# Patient Record
Sex: FEMALE | Race: WHITE | NOT HISPANIC OR LATINO | Employment: OTHER | URBAN - METROPOLITAN AREA
[De-identification: names, ages, dates, MRNs, and addresses within clinical notes are randomized per-mention and may not be internally consistent; named-entity substitution may affect disease eponyms.]

---

## 2017-01-05 ENCOUNTER — OFFICE VISIT (OUTPATIENT)
Dept: ORTHOPEDIC SURGERY | Facility: CLINIC | Age: 72
End: 2017-01-05

## 2017-01-05 VITALS — BODY MASS INDEX: 33.97 KG/M2 | TEMPERATURE: 97.6 F | HEIGHT: 64 IN | WEIGHT: 199 LBS

## 2017-01-05 DIAGNOSIS — M17.0 PRIMARY OSTEOARTHRITIS OF BOTH KNEES: Primary | ICD-10-CM

## 2017-01-05 PROCEDURE — 20610 DRAIN/INJ JOINT/BURSA W/O US: CPT | Performed by: ORTHOPAEDIC SURGERY

## 2017-01-05 RX ORDER — METHYLPREDNISOLONE ACETATE 80 MG/ML
80 INJECTION, SUSPENSION INTRA-ARTICULAR; INTRALESIONAL; INTRAMUSCULAR; SOFT TISSUE
Status: COMPLETED | OUTPATIENT
Start: 2017-01-05 | End: 2017-01-05

## 2017-01-05 RX ORDER — BUPIVACAINE HYDROCHLORIDE 5 MG/ML
2 INJECTION, SOLUTION PERINEURAL
Status: COMPLETED | OUTPATIENT
Start: 2017-01-05 | End: 2017-01-05

## 2017-01-05 RX ORDER — LIDOCAINE HYDROCHLORIDE 10 MG/ML
4 INJECTION, SOLUTION INFILTRATION; PERINEURAL
Status: COMPLETED | OUTPATIENT
Start: 2017-01-05 | End: 2017-01-05

## 2017-01-05 RX ADMIN — METHYLPREDNISOLONE ACETATE 80 MG: 80 INJECTION, SUSPENSION INTRA-ARTICULAR; INTRALESIONAL; INTRAMUSCULAR; SOFT TISSUE at 15:38

## 2017-01-05 RX ADMIN — LIDOCAINE HYDROCHLORIDE 4 ML: 10 INJECTION, SOLUTION INFILTRATION; PERINEURAL at 15:38

## 2017-01-05 RX ADMIN — BUPIVACAINE HYDROCHLORIDE 2 ML: 5 INJECTION, SOLUTION PERINEURAL at 15:38

## 2017-01-05 NOTE — PROGRESS NOTES
1/5/2017    Deanne Upton is here today for worsening knee pain. Pt has undergone injection of the knee in the past with good resolution of symptoms. Pt is requesting a repeat injection.     KNEE Injection Procedure Note:    Large Joint Arthrocentesis  Date/Time: 1/5/2017 3:38 PM  Consent given by: patient  Site marked: site marked  Timeout: Immediately prior to procedure a time out was called to verify the correct patient, procedure, equipment, support staff and site/side marked as required   Supporting Documentation  Indications: pain and joint swelling   Procedure Details  Location: knee - L knee  Preparation: Patient was prepped and draped in the usual sterile fashion  Needle size: 18 G  Approach: anterolateral  Medications administered: 4 mL lidocaine 1 %; 80 mg methylPREDNISolone acetate 80 MG/ML; 2 mL bupivacaine 0.5 %      Large Joint Arthrocentesis  Date/Time: 1/5/2017 3:38 PM  Consent given by: patient  Site marked: site marked  Timeout: Immediately prior to procedure a time out was called to verify the correct patient, procedure, equipment, support staff and site/side marked as required   Supporting Documentation  Indications: pain and joint swelling   Procedure Details  Location: knee - R knee  Preparation: Patient was prepped and draped in the usual sterile fashion  Needle size: 18 G  Approach: anterolateral  Medications administered: 4 mL lidocaine 1 %; 80 mg methylPREDNISolone acetate 80 MG/ML; 2 mL bupivacaine 0.5 %            At the conclusion of the injection I discussed the importance of continued quad strengthening exercises on a daily basis. I will see the patient back if the symptoms should fail to improve or worsen.    Sinai Allen MA  1/5/2017

## 2017-01-05 NOTE — MR AVS SNAPSHOT
Deanne Upton   1/5/2017 3:30 PM   Office Visit    Dept Phone:  [phone]   Encounter #:  [id number]    Provider:  Praveen Mg MD   Department:  [facility]                Your Full Care Plan              Your Updated Medication List          This list is accurate as of: 1/5/17  3:39 PM.  Always use your most recent med list.                albuterol 108 (90 BASE) MCG/ACT inhaler   Commonly known as:  PROVENTIL HFA;VENTOLIN HFA   Inhale 2 puffs Every 4 (Four) Hours As Needed for wheezing.       B COMPLEX + C TR tablet controlled-release       Cetirizine HCl 10 MG capsule       cyclobenzaprine 10 MG tablet   Commonly known as:  FLEXERIL   1/2 or 1 tid       diazePAM 5 MG tablet   Commonly known as:  VALIUM   Take 1 hour before MRI, may take a second one if nec       Fish Oil 600 MG capsule       fluticasone-salmeterol 250-50 MCG/DOSE DISKUS   Commonly known as:  ADVAIR   Inhale 1 puff 2 (two) times a day.       lisinopril-hydrochlorothiazide 10-12.5 MG per tablet   Commonly known as:  PRINZIDE,ZESTORETIC   Take 1 tablet by mouth  daily       metFORMIN 500 MG tablet   Commonly known as:  GLUCOPHAGE   Take 2 tablets by mouth 2 (two) times a day.       montelukast 10 MG tablet   Commonly known as:  SINGULAIR   TAKE 1 TABLET BY MOUTH  EVERY NIGHT.       MULTI VITAMIN DAILY PO       simvastatin 10 MG tablet   Commonly known as:  ZOCOR   TAKE 1 TABLET BY MOUTH  EVERY NIGHT.       traMADol 50 MG tablet   Commonly known as:  ULTRAM   Take 1 tablet by mouth Every 6 (Six) Hours As Needed for moderate pain (4-6).       Vitamin D 2000 UNITS capsule               We Performed the Following     Large Joint Arthrocentesis     Large Joint Arthrocentesis       Instructions     None    Patient Instructions History      Upcoming Appointments     Visit Type Date Time Department    FOLLOW UP 1/5/2017  3:30 PM [facility] Signup     Our  "records indicate that you have an active [facility] [employer] account.    You can view your After Visit Summary by going to [email or web address] and logging in with your [employer] username and password.  If you don't have a [employer] username and password but a parent or guardian has access to your record, the parent or guardian should login with their own [employer] username and password and access your record to view the After Visit Summary.    If you have questions, you can email [email or web address] or call [phone] to talk to our [employer] staff.  Remember, [employer] is NOT to be used for urgent needs.  For medical emergencies, dial 911.               Other Info from Your Visit           Allergies     Ibuprofen      Lips swell    Naproxen      Tramadol      Visual complaint (light show with eyes closes), joint pain, drowsy,bloody stool with diarrhea!!!!!!!      Reason for Visit     Left Knee - Follow-up, Pain, Injections     Right Knee - Follow-up, Pain, Injections           Vital Signs     Temperature Height Weight Body Mass Index Smoking Status       97.6 °F (36.4 °C) (Temporal Artery ) 64\" (162.6 cm) 199 lb (90.3 kg) 34.16 kg/m2 Former Smoker         "

## 2017-02-01 ENCOUNTER — OFFICE VISIT (OUTPATIENT)
Dept: FAMILY MEDICINE CLINIC | Facility: CLINIC | Age: 72
End: 2017-02-01

## 2017-02-01 VITALS
HEIGHT: 64 IN | TEMPERATURE: 98.2 F | OXYGEN SATURATION: 96 % | WEIGHT: 199 LBS | HEART RATE: 74 BPM | RESPIRATION RATE: 18 BRPM | SYSTOLIC BLOOD PRESSURE: 110 MMHG | DIASTOLIC BLOOD PRESSURE: 70 MMHG | BODY MASS INDEX: 33.97 KG/M2

## 2017-02-01 DIAGNOSIS — A08.4 VIRAL GASTROENTERITIS: Primary | ICD-10-CM

## 2017-02-01 PROCEDURE — 99213 OFFICE O/P EST LOW 20 MIN: CPT | Performed by: NURSE PRACTITIONER

## 2017-02-01 NOTE — PROGRESS NOTES
Subjective   Deanne Upton is a 71 y.o. female.     History of Present Illness     C/o 3 day hx diarrhea and malaise.  Initially had N/V.  But, that has resolved.  5-6 loose stools/day.  + chills. -- fever.  Lower abdominal cramping.  No abdominal pain.  Diarrhea was resolving.  But, was exacerbated after she ate cream based potato soup yesterday.  She noted several small dark red blood clots in the commode yesterday.  She has not taken any otc anti-diarrheals.          The following portions of the patient's history were reviewed and updated as appropriate: allergies, current medications, past family history, past medical history, past social history, past surgical history and problem list.    Review of Systems   Constitutional: Positive for chills and fatigue. Negative for fever.   Gastrointestinal: Positive for diarrhea, nausea and vomiting.   All other systems reviewed and are negative.      Objective   Physical Exam   Constitutional: Vital signs are normal. She appears well-developed and well-nourished.  Non-toxic appearance. She does not have a sickly appearance. She does not appear ill. No distress.   Abdominal: Soft. Normal appearance and bowel sounds are normal. There is no hepatosplenomegaly. There is generalized tenderness and tenderness in the right upper quadrant, right lower quadrant, epigastric area, left upper quadrant and left lower quadrant. There is no rigidity, no rebound and no guarding.       Assessment/Plan   Deanne was seen today for diarrhea and insomnia.    Diagnoses and all orders for this visit:    Viral gastroenteritis      otc Immodium prn.  BRAT diet.  Advance as tolerated.  Replace fluids/electrolytes with Gatorade or similar drink.      RTC prn.  If symptoms become severe go to ED--pt expressed understanding.

## 2017-02-12 DIAGNOSIS — R53.83 FATIGUE, UNSPECIFIED TYPE: ICD-10-CM

## 2017-02-12 DIAGNOSIS — E11.65 TYPE 2 DIABETES MELLITUS WITH HYPERGLYCEMIA, WITHOUT LONG-TERM CURRENT USE OF INSULIN (HCC): ICD-10-CM

## 2017-02-12 DIAGNOSIS — E55.9 HYPOVITAMINOSIS D: Primary | ICD-10-CM

## 2017-02-12 DIAGNOSIS — Z79.899 DRUG THERAPY: ICD-10-CM

## 2017-02-12 DIAGNOSIS — E78.5 HYPERLIPIDEMIA, UNSPECIFIED HYPERLIPIDEMIA TYPE: ICD-10-CM

## 2017-02-12 DIAGNOSIS — Z00.00 HEALTHCARE MAINTENANCE: ICD-10-CM

## 2017-02-28 ENCOUNTER — OFFICE VISIT (OUTPATIENT)
Dept: FAMILY MEDICINE CLINIC | Facility: CLINIC | Age: 72
End: 2017-02-28

## 2017-02-28 VITALS
WEIGHT: 198 LBS | SYSTOLIC BLOOD PRESSURE: 120 MMHG | OXYGEN SATURATION: 97 % | TEMPERATURE: 98.5 F | HEIGHT: 64 IN | HEART RATE: 100 BPM | BODY MASS INDEX: 33.8 KG/M2 | RESPIRATION RATE: 18 BRPM | DIASTOLIC BLOOD PRESSURE: 80 MMHG

## 2017-02-28 DIAGNOSIS — H91.93 HEARING LOSS, BILATERAL: ICD-10-CM

## 2017-02-28 DIAGNOSIS — H90.2 CONDUCTIVE HEARING LOSS: ICD-10-CM

## 2017-02-28 DIAGNOSIS — J01.00 ACUTE MAXILLARY SINUSITIS, RECURRENCE NOT SPECIFIED: Primary | ICD-10-CM

## 2017-02-28 PROCEDURE — 99213 OFFICE O/P EST LOW 20 MIN: CPT | Performed by: FAMILY MEDICINE

## 2017-02-28 RX ORDER — AMOXICILLIN AND CLAVULANATE POTASSIUM 875; 125 MG/1; MG/1
TABLET, FILM COATED ORAL
Qty: 14 TABLET | Refills: 0 | Status: SHIPPED | OUTPATIENT
Start: 2017-02-28 | End: 2017-03-10
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
## 2017-03-07 LAB
25(OH)D3+25(OH)D2 SERPL-MCNC: 39.9 NG/ML (ref 30–100)
ALBUMIN SERPL-MCNC: 4.5 G/DL (ref 3.5–5.2)
ALBUMIN/CREAT UR: <4.9 MG/G CREAT (ref 0–30)
ALBUMIN/GLOB SERPL: 2 G/DL
ALP SERPL-CCNC: 72 U/L (ref 39–117)
ALT SERPL-CCNC: 22 U/L (ref 1–33)
AST SERPL-CCNC: 21 U/L (ref 1–32)
BASOPHILS # BLD AUTO: 0.03 10*3/MM3 (ref 0–0.2)
BASOPHILS NFR BLD AUTO: 0.5 % (ref 0–1.5)
BILIRUB SERPL-MCNC: 0.3 MG/DL (ref 0.1–1.2)
BUN SERPL-MCNC: 18 MG/DL (ref 8–23)
BUN/CREAT SERPL: 18.2 (ref 7–25)
CALCIUM SERPL-MCNC: 9.6 MG/DL (ref 8.6–10.5)
CHLORIDE SERPL-SCNC: 102 MMOL/L (ref 98–107)
CHOLEST SERPL-MCNC: 137 MG/DL (ref 0–200)
CHOLEST/HDLC SERPL: 2.98 {RATIO}
CO2 SERPL-SCNC: 26.5 MMOL/L (ref 22–29)
CREAT SERPL-MCNC: 0.99 MG/DL (ref 0.57–1)
CREAT UR-MCNC: 61.5 MG/DL
EOSINOPHIL # BLD AUTO: 0.4 10*3/MM3 (ref 0–0.7)
EOSINOPHIL NFR BLD AUTO: 6.2 % (ref 0.3–6.2)
ERYTHROCYTE [DISTWIDTH] IN BLOOD BY AUTOMATED COUNT: 13 % (ref 11.7–13)
GLOBULIN SER CALC-MCNC: 2.3 GM/DL
GLUCOSE SERPL-MCNC: 153 MG/DL (ref 65–99)
HBA1C MFR BLD: 6.1 % (ref 4.8–5.6)
HCT VFR BLD AUTO: 43.2 % (ref 35.6–45.5)
HCV AB S/CO SERPL IA: 0.1 S/CO RATIO (ref 0–0.9)
HDLC SERPL-MCNC: 46 MG/DL (ref 40–60)
HGB BLD-MCNC: 14.2 G/DL (ref 11.9–15.5)
IMM GRANULOCYTES # BLD: 0 10*3/MM3 (ref 0–0.03)
IMM GRANULOCYTES NFR BLD: 0 % (ref 0–0.5)
LDLC SERPL CALC-MCNC: 49 MG/DL (ref 0–100)
LYMPHOCYTES # BLD AUTO: 2.34 10*3/MM3 (ref 0.9–4.8)
LYMPHOCYTES NFR BLD AUTO: 36 % (ref 19.6–45.3)
MCH RBC QN AUTO: 29.8 PG (ref 26.9–32)
MCHC RBC AUTO-ENTMCNC: 32.9 G/DL (ref 32.4–36.3)
MCV RBC AUTO: 90.8 FL (ref 80.5–98.2)
MICROALBUMIN UR-MCNC: <3 UG/ML
MONOCYTES # BLD AUTO: 0.54 10*3/MM3 (ref 0.2–1.2)
MONOCYTES NFR BLD AUTO: 8.3 % (ref 5–12)
NEUTROPHILS # BLD AUTO: 3.19 10*3/MM3 (ref 1.9–8.1)
NEUTROPHILS NFR BLD AUTO: 49 % (ref 42.7–76)
PLATELET # BLD AUTO: 228 10*3/MM3 (ref 140–500)
POTASSIUM SERPL-SCNC: 4.4 MMOL/L (ref 3.5–5.2)
PROT SERPL-MCNC: 6.8 G/DL (ref 6–8.5)
RBC # BLD AUTO: 4.76 10*6/MM3 (ref 3.9–5.2)
SODIUM SERPL-SCNC: 143 MMOL/L (ref 136–145)
TRIGL SERPL-MCNC: 212 MG/DL (ref 0–150)
TSH SERPL DL<=0.005 MIU/L-ACNC: 1.28 MIU/ML (ref 0.27–4.2)
VLDLC SERPL CALC-MCNC: 42.4 MG/DL (ref 5–40)
WBC # BLD AUTO: 6.5 10*3/MM3 (ref 4.5–10.7)

## 2017-03-10 ENCOUNTER — OFFICE VISIT (OUTPATIENT)
Dept: FAMILY MEDICINE CLINIC | Facility: CLINIC | Age: 72
End: 2017-03-10

## 2017-03-10 VITALS
DIASTOLIC BLOOD PRESSURE: 80 MMHG | HEIGHT: 64 IN | OXYGEN SATURATION: 97 % | WEIGHT: 201 LBS | SYSTOLIC BLOOD PRESSURE: 128 MMHG | HEART RATE: 78 BPM | BODY MASS INDEX: 34.31 KG/M2 | RESPIRATION RATE: 17 BRPM

## 2017-03-10 DIAGNOSIS — M47.812 CERVICAL SPINE ARTHRITIS: Primary | ICD-10-CM

## 2017-03-10 DIAGNOSIS — E66.01 MORBID OBESITY DUE TO EXCESS CALORIES (HCC): ICD-10-CM

## 2017-03-10 PROCEDURE — 99213 OFFICE O/P EST LOW 20 MIN: CPT | Performed by: FAMILY MEDICINE
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
## 2017-03-11 PROBLEM — E66.01 MORBID OBESITY (HCC): Status: ACTIVE | Noted: 2017-03-11

## 2017-04-14 ENCOUNTER — CLINICAL SUPPORT (OUTPATIENT)
Dept: ORTHOPEDIC SURGERY | Facility: CLINIC | Age: 72
End: 2017-04-14

## 2017-04-14 VITALS — BODY MASS INDEX: 33.46 KG/M2 | TEMPERATURE: 98 F | WEIGHT: 196 LBS | HEIGHT: 64 IN

## 2017-04-14 DIAGNOSIS — M17.0 PRIMARY OSTEOARTHRITIS OF BOTH KNEES: Primary | ICD-10-CM

## 2017-04-14 PROCEDURE — 20610 DRAIN/INJ JOINT/BURSA W/O US: CPT | Performed by: NURSE PRACTITIONER

## 2017-04-14 RX ORDER — METHYLPREDNISOLONE ACETATE 80 MG/ML
80 INJECTION, SUSPENSION INTRA-ARTICULAR; INTRALESIONAL; INTRAMUSCULAR; SOFT TISSUE
Status: COMPLETED | OUTPATIENT
Start: 2017-04-14 | End: 2017-04-14

## 2017-04-14 RX ORDER — BUPIVACAINE HYDROCHLORIDE 5 MG/ML
2 INJECTION, SOLUTION PERINEURAL
Status: COMPLETED | OUTPATIENT
Start: 2017-04-14 | End: 2017-04-14

## 2017-04-14 RX ORDER — LIDOCAINE HYDROCHLORIDE 10 MG/ML
4 INJECTION, SOLUTION INFILTRATION; PERINEURAL
Status: COMPLETED | OUTPATIENT
Start: 2017-04-14 | End: 2017-04-14

## 2017-04-14 RX ADMIN — LIDOCAINE HYDROCHLORIDE 4 ML: 10 INJECTION, SOLUTION INFILTRATION; PERINEURAL at 13:14

## 2017-04-14 RX ADMIN — BUPIVACAINE HYDROCHLORIDE 2 ML: 5 INJECTION, SOLUTION PERINEURAL at 13:13

## 2017-04-14 RX ADMIN — METHYLPREDNISOLONE ACETATE 80 MG: 80 INJECTION, SUSPENSION INTRA-ARTICULAR; INTRALESIONAL; INTRAMUSCULAR; SOFT TISSUE at 13:14

## 2017-04-14 RX ADMIN — BUPIVACAINE HYDROCHLORIDE 2 ML: 5 INJECTION, SOLUTION PERINEURAL at 13:14

## 2017-04-14 RX ADMIN — LIDOCAINE HYDROCHLORIDE 4 ML: 10 INJECTION, SOLUTION INFILTRATION; PERINEURAL at 13:13

## 2017-04-14 RX ADMIN — METHYLPREDNISOLONE ACETATE 80 MG: 80 INJECTION, SUSPENSION INTRA-ARTICULAR; INTRALESIONAL; INTRAMUSCULAR; SOFT TISSUE at 13:13

## 2017-04-14 NOTE — PROGRESS NOTES
4/14/2017    Deanne Upton is here today for worsening knee pain. Pt has undergone injection of the knee in the past with good resolution of symptoms. Pt is requesting a repeat injection.     KNEE Injection Procedure Note:    Large Joint Arthrocentesis  Date/Time: 4/14/2017 1:13 PM  Consent given by: patient  Site marked: site marked  Timeout: Immediately prior to procedure a time out was called to verify the correct patient, procedure, equipment, support staff and site/side marked as required   Supporting Documentation  Indications: pain and joint swelling   Procedure Details  Location: knee - R knee  Preparation: Patient was prepped and draped in the usual sterile fashion  Needle size: 18 G  Approach: anterolateral  Medications administered: 4 mL lidocaine 1 %; 80 mg methylPREDNISolone acetate 80 MG/ML; 2 mL bupivacaine      Large Joint Arthrocentesis  Date/Time: 4/14/2017 1:14 PM  Consent given by: patient  Site marked: site marked  Timeout: Immediately prior to procedure a time out was called to verify the correct patient, procedure, equipment, support staff and site/side marked as required   Supporting Documentation  Indications: pain and joint swelling   Procedure Details  Location: knee - L knee  Preparation: Patient was prepped and draped in the usual sterile fashion  Needle size: 18 G  Approach: anterolateral  Medications administered: 4 mL lidocaine 1 %; 80 mg methylPREDNISolone acetate 80 MG/ML; 2 mL bupivacaine            At the conclusion of the injection I discussed the importance of continued quad strengthening exercises on a daily basis. I will see the patient back if the symptoms should fail to improve or worsen.    Tianna Hale, APRN  4/14/2017

## 2017-04-24 DIAGNOSIS — E11.65 HYPERGLYCEMIA DUE TO TYPE 2 DIABETES MELLITUS (HCC): ICD-10-CM

## 2017-04-24 RX ORDER — LISINOPRIL AND HYDROCHLOROTHIAZIDE 12.5; 1 MG/1; MG/1
TABLET ORAL
Qty: 90 TABLET | Refills: 0 | Status: SHIPPED | OUTPATIENT
Start: 2017-04-24 | End: 2017-07-25 | Stop reason: SDUPTHER

## 2017-04-24 RX ORDER — MONTELUKAST SODIUM 10 MG/1
TABLET ORAL
Qty: 90 TABLET | Refills: 0 | Status: SHIPPED | OUTPATIENT
Start: 2017-04-24 | End: 2017-07-25 | Stop reason: SDUPTHER

## 2017-05-31 ENCOUNTER — TELEPHONE (OUTPATIENT)
Dept: ORTHOPEDIC SURGERY | Facility: CLINIC | Age: 72
End: 2017-05-31

## 2017-07-14 ENCOUNTER — CLINICAL SUPPORT (OUTPATIENT)
Dept: ORTHOPEDIC SURGERY | Facility: CLINIC | Age: 72
End: 2017-07-14

## 2017-07-14 VITALS — WEIGHT: 197 LBS | HEIGHT: 64 IN | TEMPERATURE: 97.5 F | BODY MASS INDEX: 33.63 KG/M2

## 2017-07-14 DIAGNOSIS — M17.0 PRIMARY OSTEOARTHRITIS OF BOTH KNEES: Primary | ICD-10-CM

## 2017-07-14 PROCEDURE — 20610 DRAIN/INJ JOINT/BURSA W/O US: CPT | Performed by: NURSE PRACTITIONER

## 2017-07-14 RX ORDER — LIDOCAINE HYDROCHLORIDE 10 MG/ML
4 INJECTION, SOLUTION INFILTRATION; PERINEURAL
Status: COMPLETED | OUTPATIENT
Start: 2017-07-14 | End: 2017-07-14

## 2017-07-14 RX ORDER — BUPIVACAINE HYDROCHLORIDE 2.5 MG/ML
2 INJECTION, SOLUTION INFILTRATION; PERINEURAL
Status: COMPLETED | OUTPATIENT
Start: 2017-07-14 | End: 2017-07-14

## 2017-07-14 RX ORDER — METHYLPREDNISOLONE ACETATE 80 MG/ML
80 INJECTION, SUSPENSION INTRA-ARTICULAR; INTRALESIONAL; INTRAMUSCULAR; SOFT TISSUE
Status: COMPLETED | OUTPATIENT
Start: 2017-07-14 | End: 2017-07-14

## 2017-07-14 RX ADMIN — BUPIVACAINE HYDROCHLORIDE 2 ML: 2.5 INJECTION, SOLUTION INFILTRATION; PERINEURAL at 15:54

## 2017-07-14 RX ADMIN — METHYLPREDNISOLONE ACETATE 80 MG: 80 INJECTION, SUSPENSION INTRA-ARTICULAR; INTRALESIONAL; INTRAMUSCULAR; SOFT TISSUE at 15:54

## 2017-07-14 RX ADMIN — LIDOCAINE HYDROCHLORIDE 4 ML: 10 INJECTION, SOLUTION INFILTRATION; PERINEURAL at 15:54

## 2017-07-14 NOTE — PROGRESS NOTES
7/14/2017    Deanne Upton is here today for worsening knee pain. Pt has undergone injection of the knee in the past with good resolution of symptoms. Pt is requesting a repeat injection.     KNEE Injection Procedure Note:    Large Joint Arthrocentesis  Date/Time: 7/14/2017 3:54 PM  Consent given by: patient  Site marked: site marked  Timeout: Immediately prior to procedure a time out was called to verify the correct patient, procedure, equipment, support staff and site/side marked as required   Supporting Documentation  Indications: pain and joint swelling   Procedure Details  Location: knee - R knee  Preparation: Patient was prepped and draped in the usual sterile fashion  Needle size: 18 G  Approach: anterolateral  Medications administered: 4 mL lidocaine 1 %; 80 mg methylPREDNISolone acetate 80 MG/ML; 2 mL bupivacaine      Large Joint Arthrocentesis  Date/Time: 7/14/2017 3:54 PM  Consent given by: patient  Site marked: site marked  Timeout: Immediately prior to procedure a time out was called to verify the correct patient, procedure, equipment, support staff and site/side marked as required   Supporting Documentation  Indications: pain and joint swelling   Procedure Details  Location: knee - L knee  Preparation: Patient was prepped and draped in the usual sterile fashion  Needle size: 18 G  Approach: anterolateral  Medications administered: 4 mL lidocaine 1 %; 80 mg methylPREDNISolone acetate 80 MG/ML; 2 mL bupivacaine            At the conclusion of the injection I discussed the importance of continued quad strengthening exercises on a daily basis. I will see the patient back if the symptoms should fail to improve or worsen.    Tianna Hale, APRN  7/14/2017

## 2017-07-24 ENCOUNTER — OFFICE VISIT (OUTPATIENT)
Dept: FAMILY MEDICINE CLINIC | Facility: CLINIC | Age: 72
End: 2017-07-24

## 2017-07-24 ENCOUNTER — HOSPITAL ENCOUNTER (OUTPATIENT)
Dept: GENERAL RADIOLOGY | Facility: HOSPITAL | Age: 72
Discharge: HOME | End: 2017-07-24
Admitting: FAMILY MEDICINE

## 2017-07-24 VITALS
OXYGEN SATURATION: 98 % | RESPIRATION RATE: 18 BRPM | WEIGHT: 200 LBS | SYSTOLIC BLOOD PRESSURE: 126 MMHG | HEART RATE: 85 BPM | BODY MASS INDEX: 34.15 KG/M2 | HEIGHT: 64 IN | DIASTOLIC BLOOD PRESSURE: 82 MMHG

## 2017-07-24 DIAGNOSIS — M89.8X6 PAIN IN LEFT TIBIA: ICD-10-CM

## 2017-07-24 DIAGNOSIS — T14.8XXA ABRASION: Primary | ICD-10-CM

## 2017-07-24 PROCEDURE — 73590 X-RAY EXAM OF LOWER LEG: CPT

## 2017-07-24 PROCEDURE — 99213 OFFICE O/P EST LOW 20 MIN: CPT | Performed by: FAMILY MEDICINE
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
## 2017-07-25 DIAGNOSIS — E11.65 HYPERGLYCEMIA DUE TO TYPE 2 DIABETES MELLITUS (HCC): ICD-10-CM

## 2017-07-25 RX ORDER — MONTELUKAST SODIUM 10 MG/1
TABLET ORAL
Qty: 90 TABLET | Refills: 4 | Status: SHIPPED | OUTPATIENT
Start: 2017-07-25 | End: 2018-06-20 | Stop reason: SDUPTHER

## 2017-07-25 RX ORDER — LISINOPRIL AND HYDROCHLOROTHIAZIDE 12.5; 1 MG/1; MG/1
TABLET ORAL
Qty: 90 TABLET | Refills: 4 | Status: SHIPPED | OUTPATIENT
Start: 2017-07-25 | End: 2018-06-20 | Stop reason: SDUPTHER

## 2017-08-30 DIAGNOSIS — E78.00 HYPERCHOLESTEREMIA: ICD-10-CM

## 2017-08-30 DIAGNOSIS — I10 ESSENTIAL HYPERTENSION: Primary | ICD-10-CM

## 2017-08-30 DIAGNOSIS — E55.9 VITAMIN D DEFICIENCY: ICD-10-CM

## 2017-08-30 DIAGNOSIS — E11.00 TYPE 2 DIABETES MELLITUS WITH HYPEROSMOLARITY WITHOUT COMA, WITHOUT LONG-TERM CURRENT USE OF INSULIN (HCC): ICD-10-CM

## 2017-08-30 LAB
25(OH)D3+25(OH)D2 SERPL-MCNC: 42.1 NG/ML (ref 30–100)
ALBUMIN SERPL-MCNC: 4.4 G/DL (ref 3.5–5.2)
ALBUMIN/GLOB SERPL: 2 G/DL
ALP SERPL-CCNC: 76 U/L (ref 39–117)
ALT SERPL-CCNC: 19 U/L (ref 1–33)
AST SERPL-CCNC: 20 U/L (ref 1–32)
BASOPHILS # BLD AUTO: 0.04 10*3/MM3 (ref 0–0.2)
BASOPHILS NFR BLD AUTO: 0.7 % (ref 0–1.5)
BILIRUB SERPL-MCNC: 0.3 MG/DL (ref 0.1–1.2)
BUN SERPL-MCNC: 15 MG/DL (ref 8–23)
BUN/CREAT SERPL: 14.4 (ref 7–25)
CALCIUM SERPL-MCNC: 9.7 MG/DL (ref 8.6–10.5)
CHLORIDE SERPL-SCNC: 104 MMOL/L (ref 98–107)
CHOLEST SERPL-MCNC: 145 MG/DL (ref 0–200)
CO2 SERPL-SCNC: 25.7 MMOL/L (ref 22–29)
CREAT SERPL-MCNC: 1.04 MG/DL (ref 0.57–1)
EOSINOPHIL # BLD AUTO: 0.26 10*3/MM3 (ref 0–0.7)
EOSINOPHIL NFR BLD AUTO: 4.5 % (ref 0.3–6.2)
ERYTHROCYTE [DISTWIDTH] IN BLOOD BY AUTOMATED COUNT: 13.6 % (ref 11.7–13)
GLOBULIN SER CALC-MCNC: 2.2 GM/DL
GLUCOSE SERPL-MCNC: 121 MG/DL (ref 65–99)
HBA1C MFR BLD: 6.06 % (ref 4.8–5.6)
HCT VFR BLD AUTO: 43 % (ref 35.6–45.5)
HDLC SERPL-MCNC: 43 MG/DL (ref 40–60)
HGB BLD-MCNC: 13.4 G/DL (ref 11.9–15.5)
IMM GRANULOCYTES # BLD: 0 10*3/MM3 (ref 0–0.03)
IMM GRANULOCYTES NFR BLD: 0 % (ref 0–0.5)
LDLC SERPL CALC-MCNC: 66 MG/DL (ref 0–100)
LDLC/HDLC SERPL: 1.54 {RATIO}
LYMPHOCYTES # BLD AUTO: 2.88 10*3/MM3 (ref 0.9–4.8)
LYMPHOCYTES NFR BLD AUTO: 49.7 % (ref 19.6–45.3)
MCH RBC QN AUTO: 29.9 PG (ref 26.9–32)
MCHC RBC AUTO-ENTMCNC: 31.2 G/DL (ref 32.4–36.3)
MCV RBC AUTO: 96 FL (ref 80.5–98.2)
MONOCYTES # BLD AUTO: 0.47 10*3/MM3 (ref 0.2–1.2)
MONOCYTES NFR BLD AUTO: 8.1 % (ref 5–12)
NEUTROPHILS # BLD AUTO: 2.14 10*3/MM3 (ref 1.9–8.1)
NEUTROPHILS NFR BLD AUTO: 37 % (ref 42.7–76)
PLATELET # BLD AUTO: 226 10*3/MM3 (ref 140–500)
POTASSIUM SERPL-SCNC: 4.4 MMOL/L (ref 3.5–5.2)
PROT SERPL-MCNC: 6.6 G/DL (ref 6–8.5)
RBC # BLD AUTO: 4.48 10*6/MM3 (ref 3.9–5.2)
SODIUM SERPL-SCNC: 145 MMOL/L (ref 136–145)
TRIGL SERPL-MCNC: 178 MG/DL (ref 0–150)
TSH SERPL DL<=0.005 MIU/L-ACNC: 1.65 MIU/ML (ref 0.27–4.2)
VLDLC SERPL CALC-MCNC: 35.6 MG/DL (ref 5–40)
WBC # BLD AUTO: 5.79 10*3/MM3 (ref 4.5–10.7)

## 2017-08-31 LAB
ALBUMIN/CREAT UR: <8.8 MG/G CREAT (ref 0–30)
CREAT UR-MCNC: 34.1 MG/DL
MICROALBUMIN UR-MCNC: <3 UG/ML

## 2017-09-01 ENCOUNTER — OFFICE VISIT (OUTPATIENT)
Dept: FAMILY MEDICINE CLINIC | Facility: CLINIC | Age: 72
End: 2017-09-01

## 2017-09-01 VITALS
SYSTOLIC BLOOD PRESSURE: 130 MMHG | HEIGHT: 64 IN | DIASTOLIC BLOOD PRESSURE: 70 MMHG | RESPIRATION RATE: 17 BRPM | BODY MASS INDEX: 33.63 KG/M2 | OXYGEN SATURATION: 97 % | WEIGHT: 197 LBS | HEART RATE: 73 BPM

## 2017-09-01 DIAGNOSIS — J06.9 ACUTE URI: ICD-10-CM

## 2017-09-01 DIAGNOSIS — J30.89 ALLERGIC RHINITIS DUE TO OTHER ALLERGIC TRIGGER, UNSPECIFIED RHINITIS SEASONALITY: Primary | ICD-10-CM

## 2017-09-01 PROCEDURE — 99213 OFFICE O/P EST LOW 20 MIN: CPT | Performed by: FAMILY MEDICINE

## 2017-09-01 RX ORDER — AZELASTINE 1 MG/ML
2 SPRAY, METERED NASAL 2 TIMES DAILY
Qty: 1 EACH | Refills: 12 | Status: SHIPPED | OUTPATIENT
Start: 2017-09-01 | End: 2019-02-08

## 2017-09-01 RX ORDER — BENZONATATE 100 MG/1
100 CAPSULE ORAL 3 TIMES DAILY PRN
Qty: 30 CAPSULE | Refills: 1 | Status: SHIPPED | OUTPATIENT
Start: 2017-09-01 | End: 2018-01-02

## 2017-09-01 NOTE — PROGRESS NOTES
"Subjective   Deanne Upton is a 72 y.o. female.     History of Present Illness Started to cough in the last week. Has been doing a lot of yard work. No sore throat, sneeze, fever. Nose is stuffy. No wheeze. \"I never notice I am wheezing\". Friend is the one who tells her when she wheezes.  A1C 6.1    The following portions of the patient's history were reviewed and updated as appropriate: allergies, current medications, past social history and problem list.    Review of Systems   Constitutional: Negative for activity change, appetite change and unexpected weight change.   HENT: Negative for mouth sores, nosebleeds, rhinorrhea, sinus pressure, sneezing, sore throat and trouble swallowing.    Eyes: Negative for pain and visual disturbance.   Respiratory: Positive for cough. Negative for chest tightness, shortness of breath and wheezing.    Cardiovascular: Negative for chest pain and palpitations.   Gastrointestinal: Negative for abdominal pain and blood in stool.   Endocrine: Negative.    Genitourinary: Negative for difficulty urinating and hematuria.   Musculoskeletal: Negative for joint swelling.   Skin: Negative for color change and rash.   Allergic/Immunologic: Negative.    Neurological: Negative for syncope and speech difficulty.   Hematological: Negative for adenopathy.   Psychiatric/Behavioral: Negative for agitation and confusion.   All other systems reviewed and are negative.      Objective   /70  Pulse 73  Resp 17  Ht 64\" (162.6 cm)  Wt 197 lb (89.4 kg)  SpO2 97%  BMI 33.81 kg/m2  Physical Exam   Constitutional: She is oriented to person, place, and time. Vital signs are normal. She appears well-developed and well-nourished. No distress.   HENT:   Head: Normocephalic.   Slight white exudate on tongue   Neck: Carotid bruit is not present. No thyromegaly present.   Cardiovascular: Normal rate, regular rhythm and normal heart sounds.    Pulmonary/Chest: Effort normal and breath sounds normal.    " Deanne had a diabetic foot exam performed today.   During the foot exam she had a monofilament test performed.    Neurological Sensory Findings -  Unaltered sharp/dull right ankle/foot discrimination and unaltered sharp/dull left ankle/foot discrimination.    Vascular Status -  Her exam exhibits right foot vasculature normal. Her exam exhibits left foot vasculature normal.  Neurological: She is alert and oriented to person, place, and time. Gait normal.   Psychiatric: She has a normal mood and affect. Her behavior is normal. Judgment and thought content normal.   Vitals reviewed.      Assessment/Plan   Problem List Items Addressed This Visit     None      Visit Diagnoses     Allergic rhinitis due to other allergic trigger, unspecified rhinitis seasonality    -  Primary    Relevant Medications    azelastine (ASTELIN) 0.1 % nasal spray    Acute URI               Pneumovax when she feels better.  No antibiotics needed at this time.

## 2017-09-29 ENCOUNTER — OFFICE VISIT (OUTPATIENT)
Dept: FAMILY MEDICINE CLINIC | Facility: CLINIC | Age: 72
End: 2017-09-29

## 2017-09-29 VITALS
DIASTOLIC BLOOD PRESSURE: 70 MMHG | HEART RATE: 73 BPM | HEIGHT: 64 IN | BODY MASS INDEX: 33.8 KG/M2 | OXYGEN SATURATION: 95 % | WEIGHT: 198 LBS | SYSTOLIC BLOOD PRESSURE: 120 MMHG | TEMPERATURE: 98.2 F

## 2017-09-29 DIAGNOSIS — J02.0 STREP THROAT: Primary | ICD-10-CM

## 2017-09-29 PROCEDURE — 99213 OFFICE O/P EST LOW 20 MIN: CPT | Performed by: FAMILY MEDICINE

## 2017-09-29 RX ORDER — AZITHROMYCIN 500 MG/1
TABLET, FILM COATED ORAL
Qty: 3 TABLET | Refills: 0 | Status: SHIPPED | OUTPATIENT
Start: 2017-09-29 | End: 2017-10-27 | Stop reason: SDUPTHER

## 2017-09-29 NOTE — PROGRESS NOTES
"Subjective   Deanne Upton is a 72 y.o. female.     History of Present Illness One day of throat pain. Grandson had strep the day before.  Pt had diarrhea 4 days ago, better now. No other sx.    The following portions of the patient's history were reviewed and updated as appropriate: allergies, current medications, past social history and problem list.    Review of Systems   Constitutional: Negative for activity change, appetite change, fatigue and fever.   HENT: Positive for sore throat. Negative for congestion, postnasal drip, sinus pain, sinus pressure and sneezing.    Eyes: Negative for discharge and redness.   Respiratory: Negative for cough, chest tightness and shortness of breath.    Cardiovascular: Negative for chest pain and palpitations.       Objective   /70 (BP Location: Right arm, Patient Position: Sitting, Cuff Size: Adult)  Pulse 73  Temp 98.2 °F (36.8 °C) (Oral)   Ht 64\" (162.6 cm)  Wt 198 lb (89.8 kg)  SpO2 95%  BMI 33.99 kg/m2  Physical Exam   Constitutional: She is oriented to person, place, and time. Vital signs are normal. She appears well-developed and well-nourished. No distress.   HENT:   Head: Normocephalic.   Neck: Carotid bruit is not present. No thyromegaly present.   Cardiovascular: Normal rate, regular rhythm and normal heart sounds.    Pulmonary/Chest: Effort normal and breath sounds normal.   Abdominal: She exhibits no distension. There is no tenderness.   Neurological: She is alert and oriented to person, place, and time. Gait normal.   Psychiatric: She has a normal mood and affect. Her behavior is normal. Judgment and thought content normal.   Vitals reviewed.      Assessment/Plan   Problem List Items Addressed This Visit     None      Visit Diagnoses     Strep throat    -  Primary    Relevant Medications    azithromycin (ZITHROMAX) 500 MG tablet      Will treat for strep based on proximity to grandchild who had documented stretp     "

## 2017-10-06 ENCOUNTER — OFFICE VISIT (OUTPATIENT)
Dept: FAMILY MEDICINE CLINIC | Facility: CLINIC | Age: 72
End: 2017-10-06

## 2017-10-06 VITALS
DIASTOLIC BLOOD PRESSURE: 78 MMHG | OXYGEN SATURATION: 96 % | BODY MASS INDEX: 33.8 KG/M2 | RESPIRATION RATE: 18 BRPM | WEIGHT: 198 LBS | TEMPERATURE: 98.6 F | HEIGHT: 64 IN | HEART RATE: 87 BPM | SYSTOLIC BLOOD PRESSURE: 118 MMHG

## 2017-10-06 DIAGNOSIS — J40 BRONCHITIS: Primary | ICD-10-CM

## 2017-10-06 PROCEDURE — 99213 OFFICE O/P EST LOW 20 MIN: CPT | Performed by: FAMILY MEDICINE

## 2017-10-06 PROCEDURE — G0439 PPPS, SUBSEQ VISIT: HCPCS | Performed by: FAMILY MEDICINE

## 2017-10-06 RX ORDER — CEFUROXIME AXETIL 500 MG/1
500 TABLET ORAL 2 TIMES DAILY
Qty: 14 TABLET | Refills: 0 | Status: SHIPPED | OUTPATIENT
Start: 2017-10-06 | End: 2017-10-27 | Stop reason: SDUPTHER

## 2017-10-06 RX ORDER — BUDESONIDE AND FORMOTEROL FUMARATE DIHYDRATE 160; 4.5 UG/1; UG/1
2 AEROSOL RESPIRATORY (INHALATION)
Qty: 1 INHALER | Refills: 0 | COMMUNITY
Start: 2017-10-06 | End: 2018-01-02

## 2017-10-06 NOTE — PROGRESS NOTES
QUICK REFERENCE INFORMATION:  The ABCs of the Annual Wellness Visit    Initial Medicare Wellness Visit    HEALTH RISK ASSESSMENT    1945    Recent Hospitalizations:  No hospitalization(s) within the last year..        Current Medical Providers:  Patient Care Team:  Amparo Carr MD as PCP - General  Amparo Carr MD as PCP - Claims Attributed        Smoking Status:  History   Smoking Status   • Former Smoker   Smokeless Tobacco   • Never Used       Alcohol Consumption:  History   Alcohol Use   • Yes     Comment: social drinker       Depression Screen:   PHQ-2/PHQ-9 Depression Screening 10/6/2017   Little interest or pleasure in doing things 1   Feeling down, depressed, or hopeless 0   Trouble falling or staying asleep, or sleeping too much 1   Feeling tired or having little energy 2   Poor appetite or overeating 1   Feeling bad about yourself - or that you are a failure or have let yourself or your family down 0   Trouble concentrating on things, such as reading the newspaper or watching television 0   Moving or speaking so slowly that other people could have noticed. Or the opposite - being so fidgety or restless that you have been moving around a lot more than usual 0   Thoughts that you would be better off dead, or of hurting yourself in some way 0   Total Score 5   If you checked off any problems, how difficult have these problems made it for you to do your work, take care of things at home, or get along with other people? Not difficult at all       Health Habits and Functional and Cognitive Screening:  Functional & Cognitive Status 10/6/2017   Do you have difficulty preparing food and eating? No   Do you have difficulty bathing yourself? No   Do you have difficulty getting dressed? No   Do you have difficulty using the toilet? No   Do you have difficulty moving around from place to place? No   In the past year have you fallen or experienced a near fall? No   Do you need help using the  phone?  No   Are you deaf or do you have serious difficulty hearing?  Yes   Do you need help with transportation? No   Do you need help shopping? No   Do you need help preparing meals?  No   Do you need help with housework?  No   Do you need help with laundry? No   Do you need help taking your medications? No   Do you need help managing money? No   Do you have difficulty concentrating, remembering or making decisions? No       Health Habits  Current Diet: Well Balanced Diet  Dental Exam: Not up to date  Eye Exam: Up to date  Exercise (times per week): 4 times per week  Current Exercise Activities Include: Yard Work          Does the patient have evidence of cognitive impairment? No    Asiprin use counseling: Does not need ASA (and currently is not on it)      Recent Lab Results:    Visual Acuity:  No exam data present    Age-appropriate Screening Schedule:  Refer to the list below for future screening recommendations based on patient's age, sex and/or medical conditions. Orders for these recommended tests are listed in the plan section. The patient has been provided with a written plan.    Health Maintenance   Topic Date Due   • PNEUMOCOCCAL VACCINES (65+ LOW/MEDIUM RISK) (2 of 2 - PPSV23) 07/01/2016   • INFLUENZA VACCINE  08/01/2017   • HEMOGLOBIN A1C  02/28/2018   • DIABETIC EYE EXAM  04/11/2018   • TDAP/TD VACCINES (2 - Td) 06/01/2018   • LIPID PANEL  08/30/2018   • URINE MICROALBUMIN  08/30/2018   • DIABETIC FOOT EXAM  09/01/2018   • MAMMOGRAM  04/20/2019   • COLONOSCOPY  01/01/2021   • ZOSTER VACCINE  Completed        Subjective   History of Present Illness    Deanne Upton is a 72 y.o. female who presents for an Annual Wellness Visit.    The following portions of the patient's history were reviewed and updated as appropriate: allergies, current medications, past family history, past medical history, past social history, past surgical history and problem list.    Outpatient Medications Prior to Visit    Medication Sig Dispense Refill   • ADVAIR DISKUS 250-50 MCG/DOSE DISKUS Use 1 puff two times daily 3 each 3   • albuterol (PROVENTIL HFA;VENTOLIN HFA) 108 (90 BASE) MCG/ACT inhaler Inhale 2 puffs Every 4 (Four) Hours As Needed for wheezing. 1 inhaler 11   • azelastine (ASTELIN) 0.1 % nasal spray 2 sprays into each nostril 2 (Two) Times a Day. Use in each nostril as directed 1 each 12   • azithromycin (ZITHROMAX) 500 MG tablet 1 per day 3 tablet 0   • B Complex-C-Folic Acid (B COMPLEX + C TR) tablet controlled-release Take by mouth.     • benzonatate (TESSALON PERLES) 100 MG capsule Take 1 capsule by mouth 3 (Three) Times a Day As Needed for Cough. 30 capsule 1   • Cetirizine HCl 10 MG capsule Take by mouth.     • Cholecalciferol (VITAMIN D) 2000 UNITS capsule Take by mouth.     • cyclobenzaprine (FLEXERIL) 10 MG tablet 1/2 or 1 tid 30 tablet 0   • diazepam (VALIUM) 5 MG tablet Take 1 hour before MRI, may take a second one if nec 2 tablet 0   • lisinopril-hydrochlorothiazide (PRINZIDE,ZESTORETIC) 10-12.5 MG per tablet Take 1 tablet by mouth  daily 90 tablet 4   • metFORMIN (GLUCOPHAGE) 500 MG tablet Take 2 tablets by mouth two times daily 360 tablet 4   • montelukast (SINGULAIR) 10 MG tablet TAKE 1 TABLET BY MOUTH  EVERY NIGHT. 90 tablet 4   • Multiple Vitamin (MULTI VITAMIN DAILY PO) Take by mouth.     • mupirocin (BACTROBAN) 2 % ointment Apply  topically 3 (Three) Times a Day. 30 g 0   • Nutritional Supplements (BOOST DIABETIC PO) Take  by mouth.     • Omega-3 Fatty Acids (FISH OIL) 600 MG capsule Take by mouth.     • simvastatin (ZOCOR) 10 MG tablet TAKE 1 TABLET BY MOUTH  EVERY NIGHT. 90 tablet 1     No facility-administered medications prior to visit.        Patient Active Problem List   Diagnosis   • Atopic rhinitis   • Anxiety   • Asthma   • Bunion   • Cough   • Depression   • Functional murmur   • Headache   • Bilateral hearing loss   • Hypertension   • Osteoarthritis of knee   • Pure hypercholesterolemia  "  • Chronic sinusitis   • Skin neoplasm   • Type 2 diabetes mellitus   • Vitamin D deficiency   • Vaginal prolapse   • Viral gastroenteritis   • Morbid obesity       Advance Care Planning:  has NO advance directive - information provided to the patient today    Identification of Risk Factors:  Risk factors include: weight , unhealthy diet, inactivity, chronic pain and financial stress.    Review of Systems    Compared to one year ago, the patient feels her physical health is better.  Compared to one year ago, the patient feels her mental health is better.    Objective     Physical Exam    Vitals:    10/06/17 1141   BP: 118/78   Pulse: 87   Resp: 18   Temp: 98.6 °F (37 °C)   SpO2: 96%   Weight: 198 lb (89.8 kg)   Height: 64\" (162.6 cm)       Body mass index is 33.99 kg/(m^2).  Discussed the patient's BMI with her. The BMI is above average; BMI management plan is completed.    Assessment/Plan   Patient Self-Management and Personalized Health Advice  The patient has been provided with information about: diet, exercise and weight management and preventive services including:   · Advance directive.    Visit Diagnoses:  No diagnosis found.    No orders of the defined types were placed in this encounter.      Outpatient Encounter Prescriptions as of 10/6/2017   Medication Sig Dispense Refill   • ADVAIR DISKUS 250-50 MCG/DOSE DISKUS Use 1 puff two times daily 3 each 3   • albuterol (PROVENTIL HFA;VENTOLIN HFA) 108 (90 BASE) MCG/ACT inhaler Inhale 2 puffs Every 4 (Four) Hours As Needed for wheezing. 1 inhaler 11   • azelastine (ASTELIN) 0.1 % nasal spray 2 sprays into each nostril 2 (Two) Times a Day. Use in each nostril as directed 1 each 12   • azithromycin (ZITHROMAX) 500 MG tablet 1 per day 3 tablet 0   • B Complex-C-Folic Acid (B COMPLEX + C TR) tablet controlled-release Take by mouth.     • benzonatate (TESSALON PERLES) 100 MG capsule Take 1 capsule by mouth 3 (Three) Times a Day As Needed for Cough. 30 capsule 1   • " Cetirizine HCl 10 MG capsule Take by mouth.     • Cholecalciferol (VITAMIN D) 2000 UNITS capsule Take by mouth.     • cyclobenzaprine (FLEXERIL) 10 MG tablet 1/2 or 1 tid 30 tablet 0   • diazepam (VALIUM) 5 MG tablet Take 1 hour before MRI, may take a second one if nec 2 tablet 0   • lisinopril-hydrochlorothiazide (PRINZIDE,ZESTORETIC) 10-12.5 MG per tablet Take 1 tablet by mouth  daily 90 tablet 4   • metFORMIN (GLUCOPHAGE) 500 MG tablet Take 2 tablets by mouth two times daily 360 tablet 4   • montelukast (SINGULAIR) 10 MG tablet TAKE 1 TABLET BY MOUTH  EVERY NIGHT. 90 tablet 4   • Multiple Vitamin (MULTI VITAMIN DAILY PO) Take by mouth.     • mupirocin (BACTROBAN) 2 % ointment Apply  topically 3 (Three) Times a Day. 30 g 0   • Nutritional Supplements (BOOST DIABETIC PO) Take  by mouth.     • Omega-3 Fatty Acids (FISH OIL) 600 MG capsule Take by mouth.     • simvastatin (ZOCOR) 10 MG tablet TAKE 1 TABLET BY MOUTH  EVERY NIGHT. 90 tablet 1     No facility-administered encounter medications on file as of 10/6/2017.        Reviewed use of high risk medication in the elderly: yes  Reviewed for potential of harmful drug interactions in the elderly: yes    Follow Up:  No Follow-up on file.     An After Visit Summary and PPPS with all of these plans were given to the patient.

## 2017-10-06 NOTE — PROGRESS NOTES
"Subjective   Deanne Upton is a 72 y.o. female.     History of Present Illness had been feeling better till 2 days ago up from coughing. Felt like she was \"drowning\". Sputum more yellow and green. Slept all day yesterday.  Did have 3 zithromax 500 last week.  Used albuterol and it was helpful. Has more at home.  The following portions of the patient's history were reviewed and updated as appropriate: allergies, current medications, past social history and problem list.    Review of Systems   Constitutional: Positive for fatigue. Negative for activity change and unexpected weight change.   HENT: Positive for congestion, postnasal drip and sore throat. Negative for ear pain.    Eyes: Negative for pain and discharge.   Respiratory: Positive for cough, shortness of breath and wheezing. Negative for chest tightness.    Cardiovascular: Negative for chest pain and palpitations.   Gastrointestinal: Negative for abdominal pain, diarrhea and vomiting.   Endocrine: Negative.    Genitourinary: Negative.    Musculoskeletal: Negative for joint swelling.   Skin: Negative for color change, rash and wound.   Allergic/Immunologic: Negative.    Neurological: Negative for seizures and syncope.   Psychiatric/Behavioral: Positive for sleep disturbance.       Objective   /78  Pulse 87  Temp 98.6 °F (37 °C)  Resp 18  Ht 64\" (162.6 cm)  Wt 198 lb (89.8 kg)  SpO2 96%  BMI 33.99 kg/m2  Physical Exam   Constitutional: She appears well-developed and well-nourished.   HENT:   Head: Normocephalic and atraumatic.   Right Ear: Tympanic membrane, external ear and ear canal normal.   Left Ear: Tympanic membrane, external ear and ear canal normal.   Mouth/Throat: Oropharynx is clear and moist.   geog tongue   Eyes: Conjunctivae are normal. Right eye exhibits no discharge. Left eye exhibits no discharge.   Neck: Normal range of motion. Neck supple. No thyromegaly present.   Cardiovascular: Normal rate, regular rhythm and normal heart " sounds.    Pulmonary/Chest: Effort normal and breath sounds normal. No respiratory distress. She has no wheezes. She has no rales.   Lymphadenopathy:     She has no cervical adenopathy.   Skin: Skin is warm and dry.   Psychiatric: She has a normal mood and affect. Judgment normal.   Vitals reviewed.      Assessment/Plan   Problem List Items Addressed This Visit     None      Visit Diagnoses     Bronchitis    -  Primary    Relevant Medications    cefuroxime (CEFTIN) 500 MG tablet    budesonide-formoterol (SYMBICORT) 160-4.5 MCG/ACT inhaler

## 2017-10-06 NOTE — PROGRESS NOTES
Subjective   Deanne Upton is a 72 y.o. female.     History of Present Illness     {Common H&P Review Areas:72903}    Review of Systems    Objective   Physical Exam    Assessment/Plan   {Assess/PlanSmartLinks:42619}

## 2017-10-20 ENCOUNTER — CLINICAL SUPPORT (OUTPATIENT)
Dept: ORTHOPEDIC SURGERY | Facility: CLINIC | Age: 72
End: 2017-10-20

## 2017-10-20 VITALS — TEMPERATURE: 98 F | HEIGHT: 64 IN | WEIGHT: 195 LBS | BODY MASS INDEX: 33.29 KG/M2

## 2017-10-20 DIAGNOSIS — M17.0 PRIMARY OSTEOARTHRITIS OF BOTH KNEES: Primary | ICD-10-CM

## 2017-10-20 PROCEDURE — 20610 DRAIN/INJ JOINT/BURSA W/O US: CPT | Performed by: NURSE PRACTITIONER

## 2017-10-20 RX ORDER — METHYLPREDNISOLONE ACETATE 80 MG/ML
80 INJECTION, SUSPENSION INTRA-ARTICULAR; INTRALESIONAL; INTRAMUSCULAR; SOFT TISSUE
Status: COMPLETED | OUTPATIENT
Start: 2017-10-20 | End: 2017-10-20

## 2017-10-20 RX ORDER — BUPIVACAINE HYDROCHLORIDE 5 MG/ML
2 INJECTION, SOLUTION EPIDURAL; INTRACAUDAL
Status: COMPLETED | OUTPATIENT
Start: 2017-10-20 | End: 2017-10-20

## 2017-10-20 RX ADMIN — BUPIVACAINE HYDROCHLORIDE 2 ML: 5 INJECTION, SOLUTION EPIDURAL; INTRACAUDAL at 13:55

## 2017-10-20 RX ADMIN — METHYLPREDNISOLONE ACETATE 80 MG: 80 INJECTION, SUSPENSION INTRA-ARTICULAR; INTRALESIONAL; INTRAMUSCULAR; SOFT TISSUE at 13:54

## 2017-10-20 RX ADMIN — METHYLPREDNISOLONE ACETATE 80 MG: 80 INJECTION, SUSPENSION INTRA-ARTICULAR; INTRALESIONAL; INTRAMUSCULAR; SOFT TISSUE at 13:55

## 2017-10-20 RX ADMIN — BUPIVACAINE HYDROCHLORIDE 2 ML: 5 INJECTION, SOLUTION EPIDURAL; INTRACAUDAL at 13:54

## 2017-10-20 NOTE — PROGRESS NOTES
10/20/2017    Deanne Upton is here today for worsening knee pain. Pt has undergone injection of the knee in the past with good resolution of symptoms. Pt is requesting a repeat injection.     KNEE Injection Procedure Note:    Large Joint Arthrocentesis  Date/Time: 10/20/2017 1:54 PM  Consent given by: patient  Timeout: Immediately prior to procedure a time out was called to verify the correct patient, procedure, equipment, support staff and site/side marked as required   Supporting Documentation  Indications: pain   Procedure Details  Location: knee - R knee  Preparation: Patient was prepped and draped in the usual sterile fashion  Needle size: 22 G  Approach: anterolateral  Medications administered: 2 mL bupivacaine (PF) 0.5 %; 80 mg methylPREDNISolone acetate 80 MG/ML  Patient tolerance: patient tolerated the procedure well with no immediate complications    Large Joint Arthrocentesis  Date/Time: 10/20/2017 1:55 PM  Consent given by: patient  Timeout: Immediately prior to procedure a time out was called to verify the correct patient, procedure, equipment, support staff and site/side marked as required   Supporting Documentation  Indications: pain   Procedure Details  Location: knee - L knee  Preparation: Patient was prepped and draped in the usual sterile fashion  Needle size: 22 G  Approach: anterolateral  Medications administered: 2 mL bupivacaine (PF) 0.5 %; 80 mg methylPREDNISolone acetate 80 MG/ML  Patient tolerance: patient tolerated the procedure well with no immediate complications        Prior to injection risks were discussed including but not limited to pain, elevated blood sugars, infection.  Patient verbalized understanding and would like to proceed with injections  At the conclusion of the injection I discussed the importance of continued quad strengthening exercises on a daily basis. I will see the patient back if the symptoms should fail to improve or worsen.    Tianna Hale   APRN  10/20/2017

## 2017-10-27 ENCOUNTER — OFFICE VISIT (OUTPATIENT)
Dept: FAMILY MEDICINE CLINIC | Facility: CLINIC | Age: 72
End: 2017-10-27

## 2017-10-27 VITALS
HEIGHT: 64 IN | WEIGHT: 198 LBS | RESPIRATION RATE: 18 BRPM | DIASTOLIC BLOOD PRESSURE: 80 MMHG | SYSTOLIC BLOOD PRESSURE: 140 MMHG | HEART RATE: 69 BPM | OXYGEN SATURATION: 97 % | BODY MASS INDEX: 33.8 KG/M2

## 2017-10-27 DIAGNOSIS — Z23 ENCOUNTER FOR IMMUNIZATION: ICD-10-CM

## 2017-10-27 DIAGNOSIS — M62.830 LUMBAR PARASPINAL MUSCLE SPASM: Primary | ICD-10-CM

## 2017-10-27 PROCEDURE — 90662 IIV NO PRSV INCREASED AG IM: CPT | Performed by: FAMILY MEDICINE

## 2017-10-27 PROCEDURE — 90732 PPSV23 VACC 2 YRS+ SUBQ/IM: CPT | Performed by: FAMILY MEDICINE

## 2017-10-27 PROCEDURE — G0009 ADMIN PNEUMOCOCCAL VACCINE: HCPCS | Performed by: FAMILY MEDICINE

## 2017-10-27 PROCEDURE — 99213 OFFICE O/P EST LOW 20 MIN: CPT | Performed by: FAMILY MEDICINE

## 2017-10-27 PROCEDURE — G0008 ADMIN INFLUENZA VIRUS VAC: HCPCS | Performed by: FAMILY MEDICINE

## 2017-10-27 RX ORDER — CYCLOBENZAPRINE HCL 10 MG
TABLET ORAL
Qty: 30 TABLET | Refills: 0 | Status: SHIPPED | OUTPATIENT
Start: 2017-10-27 | End: 2018-01-02

## 2017-10-27 NOTE — PROGRESS NOTES
"Subjective   Deanne Upton is a 72 y.o. female.     History of Present Illness Has somehow hurt her back. Thinks it could be a pulled muscle. Began when she pushed over trashcans that were full of water 5 days ago. Pain is in the SI area and going up into the lumbar area. No sciatic radiation of pain    The following portions of the patient's history were reviewed and updated as appropriate: allergies, current medications, past social history and problem list.    Review of Systems   Musculoskeletal: Positive for arthralgias and back pain.       Objective   /80  Pulse 69  Resp 18  Ht 64\" (162.6 cm)  Wt 198 lb (89.8 kg)  SpO2 97%  BMI 33.99 kg/m2  Physical Exam   Constitutional: She appears well-nourished.   Cardiovascular: Normal rate.    Pulmonary/Chest: Effort normal.   Musculoskeletal: She exhibits tenderness (R SI, R lumbar and R buttocks).   Gait wnl   Nursing note and vitals reviewed.      Assessment/Plan   Problem List Items Addressed This Visit     None      Visit Diagnoses     Lumbar paraspinal muscle spasm    -  Primary    Relevant Medications    cyclobenzaprine (FLEXERIL) 10 MG tablet    Encounter for immunization        Relevant Orders    Flu Vaccine High Dose PF 65YR+ (Completed)    Pneumococcal Polysaccharide Vaccine 23-Valent Greater Than or Equal To 1yo Subcutaneous / IM (Completed)           OK to take aleve bid this week.  Low back exercise sheet  Rest proper positions, heating pad.  "

## 2018-01-02 ENCOUNTER — OFFICE VISIT (OUTPATIENT)
Dept: FAMILY MEDICINE CLINIC | Facility: CLINIC | Age: 73
End: 2018-01-02

## 2018-01-02 VITALS
HEIGHT: 64 IN | BODY MASS INDEX: 35 KG/M2 | TEMPERATURE: 98.7 F | HEART RATE: 93 BPM | DIASTOLIC BLOOD PRESSURE: 74 MMHG | OXYGEN SATURATION: 98 % | SYSTOLIC BLOOD PRESSURE: 136 MMHG | WEIGHT: 205 LBS

## 2018-01-02 DIAGNOSIS — M54.50 CHRONIC RIGHT-SIDED LOW BACK PAIN WITHOUT SCIATICA: ICD-10-CM

## 2018-01-02 DIAGNOSIS — N17.9 ACUTE KIDNEY INJURY (HCC): ICD-10-CM

## 2018-01-02 DIAGNOSIS — G89.29 CHRONIC RIGHT-SIDED LOW BACK PAIN WITHOUT SCIATICA: ICD-10-CM

## 2018-01-02 DIAGNOSIS — G89.29 CHRONIC RIGHT SHOULDER PAIN: ICD-10-CM

## 2018-01-02 DIAGNOSIS — M25.511 CHRONIC RIGHT SHOULDER PAIN: ICD-10-CM

## 2018-01-02 DIAGNOSIS — J45.909 REACTIVE AIRWAY DISEASE, UNSPECIFIED ASTHMA SEVERITY, UNCOMPLICATED: ICD-10-CM

## 2018-01-02 DIAGNOSIS — J45.20 MILD INTERMITTENT ASTHMA, UNSPECIFIED WHETHER COMPLICATED: Primary | ICD-10-CM

## 2018-01-02 PROCEDURE — 99214 OFFICE O/P EST MOD 30 MIN: CPT | Performed by: FAMILY MEDICINE

## 2018-01-02 RX ORDER — ALBUTEROL SULFATE 90 UG/1
2 AEROSOL, METERED RESPIRATORY (INHALATION) EVERY 4 HOURS PRN
Qty: 1 INHALER | Refills: 11 | Status: SHIPPED | OUTPATIENT
Start: 2018-01-02 | End: 2018-08-06 | Stop reason: SDUPTHER

## 2018-01-02 RX ORDER — BUDESONIDE AND FORMOTEROL FUMARATE DIHYDRATE 160; 4.5 UG/1; UG/1
2 AEROSOL RESPIRATORY (INHALATION)
Qty: 10.2 G | Refills: 12 | Status: SHIPPED | OUTPATIENT
Start: 2018-01-02 | End: 2018-08-06 | Stop reason: SDUPTHER

## 2018-01-02 NOTE — PROGRESS NOTES
Subjective   Deanne Upton is a 72 y.o. female. Pt is here to establish care. She was previously seen by Dr. Carr. She is concerned about wheezing and shoulder and back pain that is new.     Chief Complaint   Patient presents with   • transfer of care        History of Present Illness    Asthma  Gets bronchitis and has had PNA as well. Had bronchitis just a few months ago. PNA affected her about 1 year ago she thinks, maybe 2 years ago. Has been using advair since 2004. She did also have rescue inhaler and saw an asthma doctor, just ran out. Notices she has been wheezing more for past couple of weeks. Put on a little bit of weight and thinks that may be related. She is also exercising less. Has also previously been on flonase and has azelastine but not taking routinely. Did have a short course of symbicort and felt like it gave her better relief. Wakes with wheezing right now every night, gets SOB.    Shoulder pain  Right shoulder pain, felt like there was a snap, was reaching for a pamphlet. Happened in November. Intermittent pain occurs with movements that is in the upper arm, anteriorly and laterally. Still able to use RUE, has good range of motion.     Back pain  This is not has bad as in October when she hurt it. This pain is bothersome, feels like a numb pain. Some burning, tingling, numbness. To touch she does not have as great of sensation on the lower right pain. Has been ongoing since the last 3 months. It is occasionally better but then does a certain activity like bending forward and pain recurs. This pain is sharp or shooting then dulls again to a numb.      The following portions of the patient's history were reviewed and updated as appropriate: allergies, current medications and problem list.      Review of Systems   Constitutional: Positive for activity change and unexpected weight change. Negative for appetite change.   Respiratory: Positive for cough and wheezing.    Cardiovascular:  "Negative for chest pain.   Musculoskeletal: Positive for arthralgias, back pain, neck pain and neck stiffness.   Allergic/Immunologic: Positive for environmental allergies.   Psychiatric/Behavioral: Positive for sleep disturbance.       Objective   Blood pressure 136/74, pulse 93, temperature 98.7 °F (37.1 °C), temperature source Oral, height 162.6 cm (64\"), weight 93 kg (205 lb), SpO2 98 %, not currently breastfeeding.  Physical Exam   Constitutional: She is oriented to person, place, and time. She appears well-nourished. No distress.   HENT:   Right Ear: External ear normal.   Left Ear: External ear normal.   Mouth/Throat: Oropharynx is clear and moist. No oropharyngeal exudate.   TMs retracted, small effusion. Normal canals, no TM bulge or erythema.   Eyes: Conjunctivae are normal. Right eye exhibits no discharge. Left eye exhibits no discharge.   Neck: Neck supple. No thyromegaly present.   Cardiovascular: Normal rate, regular rhythm and normal heart sounds.  Exam reveals no friction rub.    No murmur heard.  Pulmonary/Chest: Effort normal and breath sounds normal. No respiratory distress. She has no wheezes.   Musculoskeletal:   Back with FROM, has pain with forward bend, right rotational movement, and right lateral flexion at hips. It hurts/pulls over the lower right back. TTP over right lower back, area of spasm palpated. No spinal tenderness. Has FROM of BUE. 5/5 strength BUE, negative empty can, has tenderness to palpation over lateral right shoulder, deltoid area. Has palpable spasm of trapezius R>L.   Lymphadenopathy:     She has no cervical adenopathy.   Neurological: She is alert and oriented to person, place, and time.   Psychiatric: She has a normal mood and affect. Her behavior is normal.   Vitals reviewed.      Assessment/Plan   Deanne was seen today for transfer of care.    Diagnoses and all orders for this visit:    Mild intermittent asthma, unspecified whether complicated    Reactive airway " disease, unspecified asthma severity, uncomplicated  -     albuterol (PROVENTIL HFA;VENTOLIN HFA) 108 (90 Base) MCG/ACT inhaler; Inhale 2 puffs Every 4 (Four) Hours As Needed for Wheezing.    Acute kidney injury  -     Basic Metabolic Panel    Chronic right shoulder pain  -     Ambulatory Referral to Physical Therapy    Chronic right-sided low back pain without sciatica  -     Ambulatory Referral to Physical Therapy    Other orders  -     budesonide-formoterol (SYMBICORT) 160-4.5 MCG/ACT inhaler; Inhale 2 puffs 2 (Two) Times a Day.  -     Spacer/Aero Chamber Mouthpiece misc; Use with inhalers for every breathing treatment  -     Peak Flow Meter device; Use to assess breathing  -     diclofenac (VOLTAREN) 1 % gel gel; Apply 4 g topically 4 (Four) Times a Day As Needed (shouder pain).      Asthma  1.  Less well controlled currently on sure if due to worsening allergy, concomitant viral infection, weight gain.  2.  We'll change Control her from Advair to Symbicort  3.  Order spacer for inhalers and reorder albuterol.  Counseled that she can use her albuterol 2-4 puffs every 4 hours during this time until she improves, however albuterol was not meant to be use scheduled every day just for short course.  4.  Encouraged patient to take over-the-counter fluticasone intranasal spray, take her cetirizine every day and she can continue the is a listing daily too    AK I  noted to have elevated creatinine on her prior BMP, this is isolated elevation so will recheck    Shoulder and back pain  1. Likely related to how pt is compensating her posture and positions because of her neck pain  2. Pt reluctant to do PT for this because afraid they will try to manipulate her neck  3. Ordered PT for back and shoulder, note to please not work on the neck but help her learn safe positions for all joints given what she can tolerate as far as her neck positions  4. Recommended heat, massage, can continue to use the tylenol but not regular  aleve which she tolerates despite her allergies to NSAIDs. Will try topical NSAID, counseled to try on small part of skin to ensure she does not have any reaction.

## 2018-01-03 LAB
BUN SERPL-MCNC: 20 MG/DL (ref 8–23)
BUN/CREAT SERPL: 17.1 (ref 7–25)
CALCIUM SERPL-MCNC: 10 MG/DL (ref 8.6–10.5)
CHLORIDE SERPL-SCNC: 102 MMOL/L (ref 98–107)
CO2 SERPL-SCNC: 28.3 MMOL/L (ref 22–29)
CREAT SERPL-MCNC: 1.17 MG/DL (ref 0.57–1)
GLUCOSE SERPL-MCNC: 166 MG/DL (ref 65–99)
POTASSIUM SERPL-SCNC: 4 MMOL/L (ref 3.5–5.2)
SODIUM SERPL-SCNC: 143 MMOL/L (ref 136–145)

## 2018-01-04 ENCOUNTER — TELEPHONE (OUTPATIENT)
Dept: FAMILY MEDICINE CLINIC | Facility: CLINIC | Age: 73
End: 2018-01-04

## 2018-01-18 ENCOUNTER — TREATMENT (OUTPATIENT)
Dept: PHYSICAL THERAPY | Facility: CLINIC | Age: 73
End: 2018-01-18

## 2018-01-18 DIAGNOSIS — G89.29 CHRONIC RIGHT SHOULDER PAIN: ICD-10-CM

## 2018-01-18 DIAGNOSIS — M25.511 CHRONIC RIGHT SHOULDER PAIN: ICD-10-CM

## 2018-01-18 DIAGNOSIS — M54.50 CHRONIC RIGHT-SIDED LOW BACK PAIN WITHOUT SCIATICA: ICD-10-CM

## 2018-01-18 DIAGNOSIS — G89.29 CHRONIC RIGHT-SIDED LOW BACK PAIN WITHOUT SCIATICA: ICD-10-CM

## 2018-01-18 PROCEDURE — G8984 CARRY CURRENT STATUS: HCPCS | Performed by: PHYSICAL THERAPIST

## 2018-01-18 PROCEDURE — 97161 PT EVAL LOW COMPLEX 20 MIN: CPT | Performed by: PHYSICAL THERAPIST

## 2018-01-18 PROCEDURE — 97110 THERAPEUTIC EXERCISES: CPT | Performed by: PHYSICAL THERAPIST

## 2018-01-18 PROCEDURE — G8985 CARRY GOAL STATUS: HCPCS | Performed by: PHYSICAL THERAPIST

## 2018-01-18 NOTE — PROGRESS NOTES
Physical Therapy Initial Evaluation and Plan of Care    Patient: Deanne Upton   : 1945  Diagnosis/ICD-10 Code:  No primary diagnosis found.  Referring practitioner: Sangeetha Urbina MD  Past medical Hx reviewed: 2018     Subjective Evaluation    History of Present Illness  Date of surgery: 2017  Mechanism of injury: The first time I noticed shoulder pain was when I reached up to get a magazine from a rack and had some pain in the arm.  The arm was just slightly above shoulder height.  After the initial pain, I waited a while before going to see the doctor about it.  Its seems to be better since I'm heating my home better and I'm not as cold.  I do sleep on both sides and that may have something to do with the pain.     Currently, I have the most difficulty with using for trying to clear some snow from driveway.  Shoulder can sometimes bother me to reach my hair.  I can drive pretty well.  Getting laundry up/down has been difficulty        Patient Occupation: N/A (retired )   Quality of life: good    Pain  Current pain ratin  At best pain ratin  At worst pain ratin  Location: R lateral arm.    Quality: sharp and dull ache  Relieving factors: medications, heat, relaxation and rest (Tylenol OA 3x/day)  Aggravating factors: lifting, movement, outstretched reach, repetitive movement and sleeping  Progression: improved    Social Support  Lives in: multiple-level home (basement with laundry. )  Lives with: alone    Hand dominance: right    Diagnostic Tests  No diagnostic tests performed    Treatments  Previous treatment: medication  Patient Goals  Patient goals for therapy: decreased pain, increased motion, increased strength and independence with ADLs/IADLs       Objective       Postural Observations  Seated posture: fair  Standing posture: fair        Palpation   Left   Hypertonic in the upper trapezius.   Tenderness of the pectoralis major, pectoralis minor, posterior deltoid, teres  major and upper trapezius.     Right   Hypertonic in the upper trapezius. Tenderness of the pectoralis major, pectoralis minor, posterior deltoid, teres major and upper trapezius.     Active Range of Motion   Left Shoulder   Flexion: 170 degrees     Right Shoulder   Flexion: 135 degrees with pain  Abduction: 90 degrees with pain  External rotation 45°: 70 (lateral arm) degrees with pain  Internal rotation 45°: 70 degrees     Strength/Myotome Testing     Left Shoulder     Planes of Motion   Flexion: 4+   Abduction: 4+     Right Shoulder     Planes of Motion   Flexion: 4-   Extension: 5   Abduction: 3+   Adduction: 3+   External rotation at 0°: 3+   Internal rotation at 0°: 5     Isolated Muscles   Biceps: 4+   Supraspinatus: 3 (pain)   Triceps: 4+     Tests     Right Shoulder   Positive empty can and Neer's.   Negative AC shear and sulcus sign (pain reported with inferior pull).      Assessment & Plan     Assessment  Impairments: abnormal or restricted ROM, impaired physical strength and pain with function  Assessment details: Pt presents to PT with symptoms consistent with shoulder RTC dysfunction, poor posture and joint impingement.  Pt would benefit from skilled PT intervention to address the deficits noted.   Prognosis: good  Functional Limitations: lifting, sleeping, pushing, uncomfortable because of pain, reaching behind back and reaching overhead  Goals  Plan Goals: SHORT TERM GOALS: 4-5 visits  1. Patient to be compliant with HEP and no diff. with sleeping  2. Increased (R) UE strength to 4-/5 with no pain> 4/10 to allow for household and work activities.   3. Pt to exhibit (R) shoulder active flexion / ABD to (150°/ 110) in standing/sitting to assist with reaching overhead with less pain  4. Pt demonstrates improved posture in sitting and standing with minimal-no cues during treatment session    LONG TERM GOALS: 2-3 months  1. Pt score <20% perceived disability on DASH   2. Pt. to exhibit (R) shoulder AROM  to WFL (> 160° flex/abd 135). to allow for reaching overhead and behind back without pain limiting function  3. Pt to exhibit 4+/5 UE strength to allow for pushing/pulling and lifting >3 #to occur with pain <2/10  4. Pt able to reach overhead and lift 8# (B) x 5 to allow for return to doing work around home.       Plan  Therapy options: will be seen for skilled physical therapy services  Planned modality interventions: cryotherapy, electrical stimulation/Russian stimulation, iontophoresis, TENS, thermotherapy (hydrocollator packs) and ultrasound  Other planned modality interventions: Dry Needling  Planned therapy interventions: abdominal trunk stabilization, ADL retraining, flexibility, body mechanics training, home exercise program, functional ROM exercises, joint mobilization, manual therapy, neuromuscular re-education, postural training, soft tissue mobilization, spinal/joint mobilization, strengthening, stretching and therapeutic activities  Frequency: 2x week  Duration in weeks: 5  Treatment plan discussed with: patient      Manual Therapy:    -     mins  55000;  Therapeutic Exercise:    15     mins  64564;     Neuromuscular Vasquez:    -    mins  78752;    Therapeutic Activity:     -     mins  83914;     Gait Training:      -     mins  77913;     Ultrasound:     -     mins  97443;    Electrical Stimulation:    -     mins  16570 ( );  Dry Needling     -     mins self-pay    Timed Treatment:   15   mins   Total Treatment:     58   mins    PT SIGNATURE: MÓNICA Dominguez License #: [id number]    DATE TREATMENT INITIATED: 1/23/2018    Medicare Initial Certification  Certification Period: 4/23/2018  I certify that the therapy services are furnished while this patient is under my care.  The services outlined above are required by this patient, and will be reviewed every 90 days.     PHYSICIAN: Sangeetha Urbina MD      DATE:     Please sign and return via fax to [phone].. Thank you, [facility].

## 2018-01-23 ENCOUNTER — TREATMENT (OUTPATIENT)
Dept: PHYSICAL THERAPY | Facility: CLINIC | Age: 73
End: 2018-01-23

## 2018-01-23 DIAGNOSIS — G89.29 CHRONIC RIGHT SHOULDER PAIN: Primary | ICD-10-CM

## 2018-01-23 DIAGNOSIS — M25.511 CHRONIC RIGHT SHOULDER PAIN: Primary | ICD-10-CM

## 2018-01-23 DIAGNOSIS — M54.50 CHRONIC RIGHT-SIDED LOW BACK PAIN WITHOUT SCIATICA: ICD-10-CM

## 2018-01-23 DIAGNOSIS — G89.29 CHRONIC RIGHT-SIDED LOW BACK PAIN WITHOUT SCIATICA: ICD-10-CM

## 2018-01-23 PROCEDURE — 97110 THERAPEUTIC EXERCISES: CPT | Performed by: PHYSICAL THERAPIST

## 2018-01-23 PROCEDURE — 97140 MANUAL THERAPY 1/> REGIONS: CPT | Performed by: PHYSICAL THERAPIST

## 2018-01-23 NOTE — PROGRESS NOTES
Physical Therapy Daily Progress Note  Visits:2    Subjective : Deanne Upton reports: The home exercises were fine.  They aren't too hard for me.      Objective:   See Exercise, Manual, and Modality Logs for complete treatment.     Assessment/Plan:Pt tolerated treatment well but still has joint restriction.  Her weakness is primary impairment but did tolerate addition of strength activity well.  She was sensitive to manual intervention so we did not initiate any joint mobilization only STM.      Progress per Plan of Care     Manual Therapy:    12     mins  81191;  Therapeutic Exercise:    35     mins  39499;     Neuromuscular Vasquez:    -    mins  24278;    Therapeutic Activity:     -     mins  64641;     Gait Training:      -     mins  84809;     Ultrasound:     -     mins  41979;    Electrical Stimulation:    -     mins  44707 ( );  Dry Needling     -     mins self-pay    Timed Treatment:   47   mins   Total Treatment:     55   mins    MÓNICA Dominguez License #[id number]    Physical Therapist

## 2018-01-25 ENCOUNTER — TREATMENT (OUTPATIENT)
Dept: PHYSICAL THERAPY | Facility: CLINIC | Age: 73
End: 2018-01-25

## 2018-01-25 DIAGNOSIS — M25.511 CHRONIC RIGHT SHOULDER PAIN: Primary | ICD-10-CM

## 2018-01-25 DIAGNOSIS — G89.29 CHRONIC RIGHT-SIDED LOW BACK PAIN WITHOUT SCIATICA: ICD-10-CM

## 2018-01-25 DIAGNOSIS — G89.29 CHRONIC RIGHT SHOULDER PAIN: Primary | ICD-10-CM

## 2018-01-25 DIAGNOSIS — M54.50 CHRONIC RIGHT-SIDED LOW BACK PAIN WITHOUT SCIATICA: ICD-10-CM

## 2018-01-25 PROCEDURE — 97110 THERAPEUTIC EXERCISES: CPT | Performed by: PHYSICAL THERAPIST

## 2018-01-25 NOTE — PROGRESS NOTES
Physical Therapy Daily Progress Note  Visits:3    Subjective : Deanne Upton reports: Doing okay but had some questions about what I should be able to do with the shoulder  (Carry book bag? How long?  How much weight? Frequency of doing exercises?        Objective   See Exercise, Manual, and Modality Logs for complete treatment.     Assessment/Plan: Pt tolerated TE well overall and held on manual intervention due to limited tolerance from last visit.  She did respond well to progression to PT.      Progress per Plan of Care         Manual Therapy:    -     mins  74934;  Therapeutic Exercise:    35     mins  00508;     Neuromuscular Vasquez:    -    mins  46981;    Therapeutic Activity:     -     mins  73831;     Gait Training:      -     mins  35446;     Ultrasound:     -     mins  76304;    Electrical Stimulation:    -     mins  39634 ( );  Dry Needling     -     mins self-pay    Timed Treatment:   35   mins   Total Treatment:     55   mins    MÓNICA Dominguez License #[id number]    Physical Therapist

## 2018-02-01 ENCOUNTER — TREATMENT (OUTPATIENT)
Dept: PHYSICAL THERAPY | Facility: CLINIC | Age: 73
End: 2018-02-01

## 2018-02-01 DIAGNOSIS — M25.511 CHRONIC RIGHT SHOULDER PAIN: Primary | ICD-10-CM

## 2018-02-01 DIAGNOSIS — G89.29 CHRONIC RIGHT-SIDED LOW BACK PAIN WITHOUT SCIATICA: ICD-10-CM

## 2018-02-01 DIAGNOSIS — G89.29 CHRONIC RIGHT SHOULDER PAIN: Primary | ICD-10-CM

## 2018-02-01 DIAGNOSIS — M54.50 CHRONIC RIGHT-SIDED LOW BACK PAIN WITHOUT SCIATICA: ICD-10-CM

## 2018-02-01 PROCEDURE — 97110 THERAPEUTIC EXERCISES: CPT | Performed by: PHYSICAL THERAPIST

## 2018-02-01 NOTE — PROGRESS NOTES
Physical Therapy Daily Progress Note  Visits:4    Subjective : Deanne Upton reports: The shoulder is doing better but some of the exercises seem to be bothering my neck     Objective:   See Exercise, Manual, and Modality Logs for complete treatment.     Assessment/Plan:Pt demonstrating AROM WFL and was progressed to wall slides without incident.      Progress per Plan of Care and Progress strengthening /stabilization /functional activity         Manual Therapy:    -     mins  08773;  Therapeutic Exercise:    35     mins  34519;     Neuromuscular Vasquez:    -    mins  99018;    Therapeutic Activity:     -     mins  71579;     Gait Training:      -     mins  92683;     Ultrasound:     -     mins  54837;    Electrical Stimulation:    -     mins  96694 ( );  Dry Needling     -     mins self-pay    Timed Treatment:   35   mins   Total Treatment:     55   mins    MÓNICA Dominguez License #[id number]    Physical Therapist

## 2018-02-02 ENCOUNTER — CLINICAL SUPPORT (OUTPATIENT)
Dept: ORTHOPEDIC SURGERY | Facility: CLINIC | Age: 73
End: 2018-02-02

## 2018-02-02 VITALS — TEMPERATURE: 98 F | BODY MASS INDEX: 33.46 KG/M2 | HEIGHT: 64 IN | WEIGHT: 196 LBS

## 2018-02-02 DIAGNOSIS — M17.0 PRIMARY OSTEOARTHRITIS OF BOTH KNEES: Primary | ICD-10-CM

## 2018-02-02 PROCEDURE — 20610 DRAIN/INJ JOINT/BURSA W/O US: CPT | Performed by: NURSE PRACTITIONER

## 2018-02-02 RX ORDER — METHYLPREDNISOLONE ACETATE 80 MG/ML
80 INJECTION, SUSPENSION INTRA-ARTICULAR; INTRALESIONAL; INTRAMUSCULAR; SOFT TISSUE
Status: COMPLETED | OUTPATIENT
Start: 2018-02-02 | End: 2018-02-02

## 2018-02-02 RX ORDER — BUPIVACAINE HYDROCHLORIDE 5 MG/ML
2 INJECTION, SOLUTION EPIDURAL; INTRACAUDAL
Status: COMPLETED | OUTPATIENT
Start: 2018-02-02 | End: 2018-02-02

## 2018-02-02 RX ADMIN — METHYLPREDNISOLONE ACETATE 80 MG: 80 INJECTION, SUSPENSION INTRA-ARTICULAR; INTRALESIONAL; INTRAMUSCULAR; SOFT TISSUE at 13:25

## 2018-02-02 RX ADMIN — BUPIVACAINE HYDROCHLORIDE 2 ML: 5 INJECTION, SOLUTION EPIDURAL; INTRACAUDAL at 13:25

## 2018-02-02 NOTE — PROGRESS NOTES
2/2/2018    Deanne Upton is here today for worsening knee pain. Pt has undergone injection of the knee in the past with good resolution of symptoms. Pt is requesting a repeat injection.     KNEE Injection Procedure Note:    Large Joint Arthrocentesis  Date/Time: 2/2/2018 1:25 PM  Consent given by: patient  Site marked: site marked  Timeout: Immediately prior to procedure a time out was called to verify the correct patient, procedure, equipment, support staff and site/side marked as required   Supporting Documentation  Indications: pain and joint swelling   Procedure Details  Location: knee - R knee  Preparation: Patient was prepped and draped in the usual sterile fashion  Needle size: 22 G  Approach: anterolateral  Medications administered: 80 mg methylPREDNISolone acetate 80 MG/ML; 2 mL bupivacaine (PF) 0.5 %      Large Joint Arthrocentesis  Date/Time: 2/2/2018 1:25 PM  Consent given by: patient  Site marked: site marked  Timeout: Immediately prior to procedure a time out was called to verify the correct patient, procedure, equipment, support staff and site/side marked as required   Supporting Documentation  Indications: pain and joint swelling   Procedure Details  Location: knee - L knee  Preparation: Patient was prepped and draped in the usual sterile fashion  Needle size: 22 G  Approach: anterolateral  Medications administered: 80 mg methylPREDNISolone acetate 80 MG/ML; 2 mL bupivacaine (PF) 0.5 %          Prior to injections risks were discussed including pain and infection and elevated blood sugar.  Patient verbalized understanding and she would like to proceed with injection  At the conclusion of the injection I discussed the importance of continued quad strengthening exercises on a daily basis. I will see the patient back if the symptoms should fail to improve or worsen.    Tianna Hale, APRN  2/2/2018

## 2018-02-05 ENCOUNTER — OFFICE VISIT (OUTPATIENT)
Dept: FAMILY MEDICINE CLINIC | Facility: CLINIC | Age: 73
End: 2018-02-05

## 2018-02-05 VITALS
SYSTOLIC BLOOD PRESSURE: 130 MMHG | HEIGHT: 64 IN | OXYGEN SATURATION: 100 % | HEART RATE: 88 BPM | DIASTOLIC BLOOD PRESSURE: 70 MMHG | WEIGHT: 200 LBS | BODY MASS INDEX: 34.15 KG/M2

## 2018-02-05 DIAGNOSIS — N18.30 STAGE 3 CHRONIC KIDNEY DISEASE (HCC): Primary | ICD-10-CM

## 2018-02-05 DIAGNOSIS — I10 ESSENTIAL HYPERTENSION: ICD-10-CM

## 2018-02-05 DIAGNOSIS — Z12.39 SCREENING FOR BREAST CANCER: ICD-10-CM

## 2018-02-05 DIAGNOSIS — E11.9 TYPE 2 DIABETES MELLITUS WITHOUT COMPLICATION, WITHOUT LONG-TERM CURRENT USE OF INSULIN (HCC): ICD-10-CM

## 2018-02-05 DIAGNOSIS — E78.1 HYPERTRIGLYCERIDEMIA: ICD-10-CM

## 2018-02-05 PROBLEM — A08.4 VIRAL GASTROENTERITIS: Status: RESOLVED | Noted: 2017-02-01 | Resolved: 2018-02-05

## 2018-02-05 PROCEDURE — 99214 OFFICE O/P EST MOD 30 MIN: CPT | Performed by: FAMILY MEDICINE
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
## 2018-02-06 ENCOUNTER — TREATMENT (OUTPATIENT)
Dept: PHYSICAL THERAPY | Facility: CLINIC | Age: 73
End: 2018-02-06

## 2018-02-06 DIAGNOSIS — M25.511 CHRONIC RIGHT SHOULDER PAIN: Primary | ICD-10-CM

## 2018-02-06 DIAGNOSIS — G89.29 CHRONIC RIGHT SHOULDER PAIN: Primary | ICD-10-CM

## 2018-02-06 DIAGNOSIS — M54.50 CHRONIC RIGHT-SIDED LOW BACK PAIN WITHOUT SCIATICA: ICD-10-CM

## 2018-02-06 DIAGNOSIS — G89.29 CHRONIC RIGHT-SIDED LOW BACK PAIN WITHOUT SCIATICA: ICD-10-CM

## 2018-02-06 LAB
ALBUMIN SERPL-MCNC: 4.6 G/DL (ref 3.5–5.2)
ALBUMIN/GLOB SERPL: 1.6 G/DL
ALP SERPL-CCNC: 77 U/L (ref 39–117)
ALT SERPL-CCNC: 21 U/L (ref 1–33)
AST SERPL-CCNC: 17 U/L (ref 1–32)
BILIRUB SERPL-MCNC: 0.3 MG/DL (ref 0.1–1.2)
BUN SERPL-MCNC: 27 MG/DL (ref 8–23)
BUN/CREAT SERPL: 25 (ref 7–25)
CALCIUM SERPL-MCNC: 10.6 MG/DL (ref 8.6–10.5)
CHLORIDE SERPL-SCNC: 100 MMOL/L (ref 98–107)
CHOLEST SERPL-MCNC: 128 MG/DL (ref 0–200)
CO2 SERPL-SCNC: 28.4 MMOL/L (ref 22–29)
CREAT SERPL-MCNC: 1.08 MG/DL (ref 0.57–1)
GFR SERPLBLD CREATININE-BSD FMLA CKD-EPI: 50 ML/MIN/1.73
GFR SERPLBLD CREATININE-BSD FMLA CKD-EPI: 61 ML/MIN/1.73
GLOBULIN SER CALC-MCNC: 2.8 GM/DL
GLUCOSE SERPL-MCNC: 106 MG/DL (ref 65–99)
HBA1C MFR BLD: 6.1 % (ref 4.8–5.6)
HDLC SERPL-MCNC: 52 MG/DL (ref 40–60)
LDLC SERPL CALC-MCNC: 32 MG/DL (ref 0–100)
POTASSIUM SERPL-SCNC: 4.3 MMOL/L (ref 3.5–5.2)
PROT SERPL-MCNC: 7.4 G/DL (ref 6–8.5)
SODIUM SERPL-SCNC: 142 MMOL/L (ref 136–145)
TRIGL SERPL-MCNC: 221 MG/DL (ref 0–150)
VLDLC SERPL CALC-MCNC: 44.2 MG/DL (ref 5–40)

## 2018-02-06 PROCEDURE — 97110 THERAPEUTIC EXERCISES: CPT | Performed by: PHYSICAL THERAPIST

## 2018-02-06 NOTE — PROGRESS NOTES
Physical Therapy Daily Progress Note  Visits:5    Subjective : Deanne Upton reports: Now that I think about things, I think pulling my lawnmower cord is what irritated my shoulder.  My blood work did come back fine and my liver function is fine.      Objective:   See Exercise, Manual, and Modality Logs for complete treatment.     Assessment/Plan: Pt tolerated the treatment with mild progression well.  We continue to keep a fairly hands-off approach due to report of irritation with passive ROM and STM .      Progress per Plan of Care and Progress strengthening /stabilization /functional activity       Manual Therapy:    -     mins  59605;  Therapeutic Exercise:    35     mins  45774;     Neuromuscular Vasquez:    -    mins  42868;    Therapeutic Activity:     -     mins  59151;     Gait Training:      -     mins  77444;     Ultrasound:     -     mins  85342;    Electrical Stimulation:    -     mins  87825 ( );  Dry Needling     -     mins self-pay    Timed Treatment:   35   mins   Total Treatment:     60   mins    MÓNICA Dominguez License #[id number]    Physical Therapist

## 2018-02-08 ENCOUNTER — TREATMENT (OUTPATIENT)
Dept: PHYSICAL THERAPY | Facility: CLINIC | Age: 73
End: 2018-02-08

## 2018-02-08 DIAGNOSIS — M54.50 CHRONIC RIGHT-SIDED LOW BACK PAIN WITHOUT SCIATICA: ICD-10-CM

## 2018-02-08 DIAGNOSIS — G89.29 CHRONIC RIGHT SHOULDER PAIN: Primary | ICD-10-CM

## 2018-02-08 DIAGNOSIS — G89.29 CHRONIC RIGHT-SIDED LOW BACK PAIN WITHOUT SCIATICA: ICD-10-CM

## 2018-02-08 DIAGNOSIS — M25.511 CHRONIC RIGHT SHOULDER PAIN: Primary | ICD-10-CM

## 2018-02-08 PROCEDURE — 97110 THERAPEUTIC EXERCISES: CPT | Performed by: PHYSICAL THERAPIST

## 2018-02-08 NOTE — PROGRESS NOTES
Physical Therapy Daily Progress Note  Visits:6    Subjective : Deanne Upton reports: she has been busy taking care of her grandson.  She states mild neck and shoulder pain this morning.    Objective   See Exercise, Manual, and Modality Logs for complete treatment.     Assessment/Plan: Deanne has been tolerating her exercises well.  Weight has been added to her supine ceiling punches.      Anticipate DC next Visit       Manual Therapy:    -     mins  74209;  Therapeutic Exercise:    35     mins  22456;     Neuromuscular Vasquez:    -    mins  72549;    Therapeutic Activity:     -     mins  73259;     Gait Training:      -     mins  85074;     Ultrasound:     -     mins  87224;    Electrical Stimulation:    -     mins  88065 ( );  Dry Needling     -     mins self-pay    Timed Treatment:   35   mins   Total Treatment:     60   mins        MÓNICA Dominguez License #[id number]    Physical Therapist

## 2018-02-09 ENCOUNTER — CONSULT (OUTPATIENT)
Dept: ORTHOPEDIC SURGERY | Facility: CLINIC | Age: 73
End: 2018-02-09

## 2018-02-09 VITALS — BODY MASS INDEX: 34.15 KG/M2 | WEIGHT: 200 LBS | TEMPERATURE: 97.8 F | HEIGHT: 64 IN

## 2018-02-09 DIAGNOSIS — M19.079 ARTHRITIS OF FOOT: ICD-10-CM

## 2018-02-09 DIAGNOSIS — M79.672 FOOT PAIN, BILATERAL: Primary | ICD-10-CM

## 2018-02-09 DIAGNOSIS — M20.10 VALGUS DEFORMITY OF GREAT TOE, UNSPECIFIED LATERALITY: ICD-10-CM

## 2018-02-09 DIAGNOSIS — M77.42 METATARSALGIA OF BOTH FEET: ICD-10-CM

## 2018-02-09 DIAGNOSIS — M79.671 FOOT PAIN, BILATERAL: Primary | ICD-10-CM

## 2018-02-09 DIAGNOSIS — M77.41 METATARSALGIA OF BOTH FEET: ICD-10-CM

## 2018-02-09 PROCEDURE — 73630 X-RAY EXAM OF FOOT: CPT | Performed by: ORTHOPAEDIC SURGERY

## 2018-02-09 PROCEDURE — 99214 OFFICE O/P EST MOD 30 MIN: CPT | Performed by: ORTHOPAEDIC SURGERY
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
## 2018-02-13 ENCOUNTER — TREATMENT (OUTPATIENT)
Dept: PHYSICAL THERAPY | Facility: CLINIC | Age: 73
End: 2018-02-13

## 2018-02-13 DIAGNOSIS — G89.29 CHRONIC RIGHT SHOULDER PAIN: Primary | ICD-10-CM

## 2018-02-13 DIAGNOSIS — M54.50 CHRONIC RIGHT-SIDED LOW BACK PAIN WITHOUT SCIATICA: ICD-10-CM

## 2018-02-13 DIAGNOSIS — G89.29 CHRONIC RIGHT-SIDED LOW BACK PAIN WITHOUT SCIATICA: ICD-10-CM

## 2018-02-13 DIAGNOSIS — M25.511 CHRONIC RIGHT SHOULDER PAIN: Primary | ICD-10-CM

## 2018-02-13 PROCEDURE — 97110 THERAPEUTIC EXERCISES: CPT | Performed by: PHYSICAL THERAPIST

## 2018-02-13 PROCEDURE — 97530 THERAPEUTIC ACTIVITIES: CPT | Performed by: PHYSICAL THERAPIST

## 2018-02-13 NOTE — PROGRESS NOTES
Physical Therapy Discharge Note  Visits:7    Subjective : Deanne Upton reports: she experienced pain in her R shoulder over the weekend but currently does not have any pain.  She states the pain has moved from her biceps to her shoulder joint. She does report feeling better following moist heat and performing some stretching.  When asked,     Objective   Postural Observations  Seated posture: fair  Standing posture: fair        Palpation   Left   Hypertonic in the upper trapezius.   Tenderness of the pectoralis major, pectoralis minor, posterior deltoid, teres major and upper trapezius.      Right   Hypertonic in the upper trapezius. Tenderness of the pectoralis major, pectoralis minor, posterior deltoid, teres major and upper trapezius.      Active Range of Motion   Left Shoulder   Flexion: 170 degrees     Right Shoulder   Flexion: 155 degrees with discomfort at end range   Abduction: 125 degrees   External rotation 45°: 70 (lateral arm) degrees with pain  Internal rotation 45°: 70 degrees     Strength/Myotome Testing     Left Shoulder      Planes of Motion   Flexion: 4+   Abduction: 4+     Right Shoulder      Planes of Motion   Flexion: 4+  Extension: 5   Abduction: 4-   Adduction: 5   External rotation at 0°: 4 with mild discomfort   Internal rotation at 0°: 5      Isolated Muscles   Biceps: 5  Supraspinatus: 4   Triceps: 5-     Tests      Right Shoulder    Negative empty can and Neer's.      See Exercise, Manual, and Modality Logs for complete treatment.     Assessment & Plan     Assessment  Impairments: abnormal or restricted ROM and impaired physical strength  Assessment details: Pt has responded well to PT intervention and has demonstrated good compliance to HEP.  She has had good improvements with ROM and strength of the shoulder.  She will continue to benefit from HEP but no longer requires skilled PT intervention.   Prognosis: good  Functional Limitations: lifting  Goals  Plan Goals: Plan Goals: SHORT  TERM GOALS: 4-5 visits (all met)   1. Patient to be compliant with HEP and no diff. with sleeping  2. Increased (R) UE strength to 4-/5 with no pain> 4/10 to allow for household and work activities.   3. Pt to exhibit (R) shoulder active flexion / ABD to (150°/ 110) in standing/sitting to assist with reaching overhead with less pain  4. Pt demonstrates improved posture in sitting and standing with minimal-no cues during treatment session.      LONG TERM GOALS: 2-3 months  1. Pt score <20% perceived disability on DASH (MET)  2. Pt. to exhibit (R) shoulder AROM to WFL (> 160° flex/abd 135). to allow for reaching overhead and behind back without pain limiting function. (MET)  3. Pt to exhibit 4+/5 UE strength to allow for pushing/pulling and lifting >3 #to occur with pain <2/10(MET functional goal, mild MMT limitations ongoing)    4. Pt able to reach overhead and lift 8# (B) x 5 to allow for return to doing work around home. (MET)    Plan  Therapy options: will not be seen for skilled physical therapy services  Planned therapy interventions: home exercise program  Treatment plan discussed with: patient      Other: Discharge from PT.           Manual Therapy:    -     mins  99726;  Therapeutic Exercise:    15     mins  40316;     Neuromuscular Vasquez:        mins  12419;    Therapeutic Activity:     15     mins  26300;  Tests and measures    Gait Training:      -     mins  26971;     Ultrasound:     -     mins  53581;    Electrical Stimulation:    -     mins  05924 ( );  Dry Needling     -     mins self-pay    Timed Treatment:   25   mins   Total Treatment:     50   mins      MÓNICA Dominguez License #[id number]    Physical Therapist

## 2018-02-22 ENCOUNTER — TELEPHONE (OUTPATIENT)
Dept: FAMILY MEDICINE CLINIC | Facility: CLINIC | Age: 73
End: 2018-02-22

## 2018-02-26 ENCOUNTER — TELEPHONE (OUTPATIENT)
Dept: FAMILY MEDICINE CLINIC | Facility: CLINIC | Age: 73
End: 2018-02-26
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
## 2018-03-02 ENCOUNTER — TELEPHONE (OUTPATIENT)
Dept: FAMILY MEDICINE CLINIC | Facility: CLINIC | Age: 73
End: 2018-03-02

## 2018-03-02 NOTE — TELEPHONE ENCOUNTER
Spoke with patient in regards to her breathing and coughing concerns.  She reported she is overall doing well her symptoms being managed with the Symbicort.  The addition of the Symbicort seems to have helped significantly.  Just using the albuterol as needed.  She has gotten some pump and the water is no longer in her house.  She has been working up and down the stairs to eliminate wet material from her house.  She is tolerating doing the steps quite well.  She is wondering about risks for black mold in needing to dry out her basement.  We did discuss use of hands and heaters.  Counseled on safe use of the heaters to avoid fire or harm.  Patient voiced understanding.  Voice overall status of breathing and coughing is under control.  She is call back if she has any worsening.

## 2018-05-03 ENCOUNTER — CLINICAL SUPPORT (OUTPATIENT)
Dept: ORTHOPEDIC SURGERY | Facility: CLINIC | Age: 73
End: 2018-05-03

## 2018-05-03 VITALS — WEIGHT: 196 LBS | HEIGHT: 64 IN | TEMPERATURE: 97.6 F | BODY MASS INDEX: 33.46 KG/M2

## 2018-05-03 DIAGNOSIS — M17.0 PRIMARY OSTEOARTHRITIS OF BOTH KNEES: Primary | ICD-10-CM

## 2018-05-03 PROCEDURE — 20610 DRAIN/INJ JOINT/BURSA W/O US: CPT | Performed by: NURSE PRACTITIONER

## 2018-05-03 RX ORDER — LIDOCAINE HYDROCHLORIDE 20 MG/ML
2 INJECTION, SOLUTION EPIDURAL; INFILTRATION; INTRACAUDAL; PERINEURAL
Status: COMPLETED | OUTPATIENT
Start: 2018-05-03 | End: 2018-05-03

## 2018-05-03 RX ORDER — METHYLPREDNISOLONE ACETATE 80 MG/ML
80 INJECTION, SUSPENSION INTRA-ARTICULAR; INTRALESIONAL; INTRAMUSCULAR; SOFT TISSUE
Status: COMPLETED | OUTPATIENT
Start: 2018-05-03 | End: 2018-05-03

## 2018-05-03 RX ADMIN — METHYLPREDNISOLONE ACETATE 80 MG: 80 INJECTION, SUSPENSION INTRA-ARTICULAR; INTRALESIONAL; INTRAMUSCULAR; SOFT TISSUE at 10:40

## 2018-05-03 RX ADMIN — LIDOCAINE HYDROCHLORIDE 2 ML: 20 INJECTION, SOLUTION EPIDURAL; INFILTRATION; INTRACAUDAL; PERINEURAL at 10:40

## 2018-05-03 NOTE — PROGRESS NOTES
5/3/2018    Deanne Upton is here today for worsening knee pain. Pt has undergone injection of the knee in the past with good resolution of symptoms. Pt is requesting a repeat injection.     KNEE Injection Procedure Note:    Large Joint Arthrocentesis  Date/Time: 5/3/2018 10:40 AM  Consent given by: patient  Site marked: site marked  Timeout: Immediately prior to procedure a time out was called to verify the correct patient, procedure, equipment, support staff and site/side marked as required   Supporting Documentation  Indications: pain and joint swelling   Procedure Details  Location: knee - R knee  Preparation: Patient was prepped and draped in the usual sterile fashion  Needle size: 22 G  Approach: anterolateral  Medications administered: 2 mL lidocaine PF 2% 2 %; 80 mg methylPREDNISolone acetate 80 MG/ML  Patient tolerance: patient tolerated the procedure well with no immediate complications    Large Joint Arthrocentesis  Date/Time: 5/3/2018 10:40 AM  Consent given by: patient  Site marked: site marked  Timeout: Immediately prior to procedure a time out was called to verify the correct patient, procedure, equipment, support staff and site/side marked as required   Supporting Documentation  Indications: pain and joint swelling   Procedure Details  Location: knee - L knee  Preparation: Patient was prepped and draped in the usual sterile fashion  Needle size: 22 G  Approach: anterolateral  Medications administered: 2 mL lidocaine PF 2% 2 %; 80 mg methylPREDNISolone acetate 80 MG/ML  Patient tolerance: patient tolerated the procedure well with no immediate complications      Prior to injections risks were discussed including pain, infection, elevated blood sugar.  Patient verbalized understanding would like to proceed with injection    At the conclusion of the injection I discussed the importance of continued quad strengthening exercises on a daily basis. I will see the patient back if the symptoms should fail  to improve or worsen.    Tianna Hale APRN  5/3/2018

## 2018-06-06 ENCOUNTER — TELEPHONE (OUTPATIENT)
Dept: FAMILY MEDICINE CLINIC | Facility: CLINIC | Age: 73
End: 2018-06-06

## 2018-06-06 NOTE — TELEPHONE ENCOUNTER
Sajan Araiza had scheduled Mammo for 4/23/18 at [facility]. We dont have results please call for them. This is in your overdue task thanks

## 2018-06-20 DIAGNOSIS — E11.65 TYPE 2 DIABETES MELLITUS WITH HYPERGLYCEMIA, UNSPECIFIED LONG TERM INSULIN USE STATUS: ICD-10-CM

## 2018-06-20 RX ORDER — LISINOPRIL AND HYDROCHLOROTHIAZIDE 12.5; 1 MG/1; MG/1
1 TABLET ORAL DAILY
Qty: 90 TABLET | Refills: 2 | Status: SHIPPED | OUTPATIENT
Start: 2018-06-20 | End: 2018-08-06 | Stop reason: SDUPTHER

## 2018-06-20 RX ORDER — MONTELUKAST SODIUM 10 MG/1
10 TABLET ORAL NIGHTLY
Qty: 90 TABLET | Refills: 2 | Status: SHIPPED | OUTPATIENT
Start: 2018-06-20 | End: 2018-08-06 | Stop reason: SDUPTHER

## 2018-08-01 DIAGNOSIS — I10 ESSENTIAL HYPERTENSION: ICD-10-CM

## 2018-08-01 DIAGNOSIS — E78.49 OTHER HYPERLIPIDEMIA: ICD-10-CM

## 2018-08-01 DIAGNOSIS — E11.00 TYPE 2 DIABETES MELLITUS WITH HYPEROSMOLARITY WITHOUT COMA, WITHOUT LONG-TERM CURRENT USE OF INSULIN (HCC): Primary | ICD-10-CM

## 2018-08-01 LAB
ALBUMIN SERPL-MCNC: 4.3 G/DL (ref 3.5–5.2)
ALBUMIN/GLOB SERPL: 2.2 G/DL
ALP SERPL-CCNC: 72 U/L (ref 39–117)
ALT SERPL-CCNC: 18 U/L (ref 1–33)
AST SERPL-CCNC: 19 U/L (ref 1–32)
BILIRUB SERPL-MCNC: 0.3 MG/DL (ref 0.1–1.2)
BUN SERPL-MCNC: 17 MG/DL (ref 8–23)
BUN/CREAT SERPL: 16.8 (ref 7–25)
CALCIUM SERPL-MCNC: 9.6 MG/DL (ref 8.6–10.5)
CHLORIDE SERPL-SCNC: 104 MMOL/L (ref 98–107)
CHOLEST SERPL-MCNC: 133 MG/DL (ref 0–200)
CO2 SERPL-SCNC: 24.6 MMOL/L (ref 22–29)
CREAT SERPL-MCNC: 1.01 MG/DL (ref 0.57–1)
GLOBULIN SER CALC-MCNC: 2 GM/DL
GLUCOSE SERPL-MCNC: 129 MG/DL (ref 65–99)
HBA1C MFR BLD: 5.83 % (ref 4.8–5.6)
HDLC SERPL-MCNC: 39 MG/DL (ref 40–60)
LDLC SERPL CALC-MCNC: 46 MG/DL (ref 0–100)
LDLC/HDLC SERPL: 1.19 {RATIO}
POTASSIUM SERPL-SCNC: 4.2 MMOL/L (ref 3.5–5.2)
PROT SERPL-MCNC: 6.3 G/DL (ref 6–8.5)
SODIUM SERPL-SCNC: 142 MMOL/L (ref 136–145)
TRIGL SERPL-MCNC: 238 MG/DL (ref 0–150)
VLDLC SERPL CALC-MCNC: 47.6 MG/DL (ref 5–40)

## 2018-08-03 ENCOUNTER — CLINICAL SUPPORT (OUTPATIENT)
Dept: ORTHOPEDIC SURGERY | Facility: CLINIC | Age: 73
End: 2018-08-03

## 2018-08-03 VITALS — TEMPERATURE: 98.1 F | WEIGHT: 185 LBS | BODY MASS INDEX: 31.58 KG/M2 | HEIGHT: 64 IN

## 2018-08-03 DIAGNOSIS — M17.0 PRIMARY OSTEOARTHRITIS OF BOTH KNEES: Primary | ICD-10-CM

## 2018-08-03 PROCEDURE — 20610 DRAIN/INJ JOINT/BURSA W/O US: CPT | Performed by: NURSE PRACTITIONER

## 2018-08-03 RX ORDER — METHYLPREDNISOLONE ACETATE 80 MG/ML
80 INJECTION, SUSPENSION INTRA-ARTICULAR; INTRALESIONAL; INTRAMUSCULAR; SOFT TISSUE
Status: COMPLETED | OUTPATIENT
Start: 2018-08-03 | End: 2018-08-03

## 2018-08-03 RX ORDER — LIDOCAINE HYDROCHLORIDE 10 MG/ML
2 INJECTION, SOLUTION EPIDURAL; INFILTRATION; INTRACAUDAL; PERINEURAL
Status: COMPLETED | OUTPATIENT
Start: 2018-08-03 | End: 2018-08-03

## 2018-08-03 RX ADMIN — LIDOCAINE HYDROCHLORIDE 2 ML: 10 INJECTION, SOLUTION EPIDURAL; INFILTRATION; INTRACAUDAL; PERINEURAL at 13:40

## 2018-08-03 RX ADMIN — METHYLPREDNISOLONE ACETATE 80 MG: 80 INJECTION, SUSPENSION INTRA-ARTICULAR; INTRALESIONAL; INTRAMUSCULAR; SOFT TISSUE at 13:40

## 2018-08-03 NOTE — PROGRESS NOTES
8/3/2018    Deanne Upton is here today for worsening knee pain. Pt has undergone injection of the knee in the past with good resolution of symptoms. Pt is requesting a repeat injection.     KNEE Injection Procedure Note:    Large Joint Arthrocentesis  Date/Time: 8/3/2018 1:40 PM  Consent given by: patient  Site marked: site marked  Timeout: Immediately prior to procedure a time out was called to verify the correct patient, procedure, equipment, support staff and site/side marked as required   Supporting Documentation  Indications: pain and joint swelling   Procedure Details  Location: knee - R knee  Preparation: Patient was prepped and draped in the usual sterile fashion  Needle size: 22 G  Approach: anterolateral  Medications administered: 80 mg methylPREDNISolone acetate 80 MG/ML; 2 mL lidocaine PF 1% 1 %  Patient tolerance: patient tolerated the procedure well with no immediate complications    Large Joint Arthrocentesis  Date/Time: 8/3/2018 1:40 PM  Consent given by: patient  Site marked: site marked  Timeout: Immediately prior to procedure a time out was called to verify the correct patient, procedure, equipment, support staff and site/side marked as required   Supporting Documentation  Indications: pain and joint swelling   Procedure Details  Location: knee - L knee  Preparation: Patient was prepped and draped in the usual sterile fashion  Needle size: 22 G  Approach: anterolateral  Medications administered: 80 mg methylPREDNISolone acetate 80 MG/ML; 2 mL lidocaine PF 1% 1 %  Patient tolerance: patient tolerated the procedure well with no immediate complications      Prior to injections risks were discussed including pain, infection, elevated blood sugar.  Patient verbalized understanding would like to proceed with injections    At the conclusion of the injection I discussed the importance of continued quad strengthening exercises on a daily basis. I will see the patient back if the symptoms should fail to  improve or worsen.    Tianna Hale APRN  8/3/2018

## 2018-08-06 ENCOUNTER — OFFICE VISIT (OUTPATIENT)
Dept: FAMILY MEDICINE CLINIC | Facility: CLINIC | Age: 73
End: 2018-08-06

## 2018-08-06 VITALS
OXYGEN SATURATION: 99 % | DIASTOLIC BLOOD PRESSURE: 72 MMHG | HEART RATE: 113 BPM | WEIGHT: 188 LBS | BODY MASS INDEX: 32.1 KG/M2 | HEIGHT: 64 IN | SYSTOLIC BLOOD PRESSURE: 128 MMHG

## 2018-08-06 DIAGNOSIS — M47.892 OTHER OSTEOARTHRITIS OF SPINE, CERVICAL REGION: ICD-10-CM

## 2018-08-06 DIAGNOSIS — I10 ESSENTIAL HYPERTENSION: ICD-10-CM

## 2018-08-06 DIAGNOSIS — E11.9 TYPE 2 DIABETES MELLITUS WITHOUT COMPLICATION, WITHOUT LONG-TERM CURRENT USE OF INSULIN (HCC): Primary | ICD-10-CM

## 2018-08-06 DIAGNOSIS — J45.909 REACTIVE AIRWAY DISEASE, UNSPECIFIED ASTHMA SEVERITY, UNCOMPLICATED: ICD-10-CM

## 2018-08-06 DIAGNOSIS — J45.20 MILD INTERMITTENT ASTHMA, UNSPECIFIED WHETHER COMPLICATED: ICD-10-CM

## 2018-08-06 DIAGNOSIS — M54.2 NECK PAIN: ICD-10-CM

## 2018-08-06 PROCEDURE — 72040 X-RAY EXAM NECK SPINE 2-3 VW: CPT | Performed by: FAMILY MEDICINE

## 2018-08-06 PROCEDURE — 99214 OFFICE O/P EST MOD 30 MIN: CPT | Performed by: FAMILY MEDICINE

## 2018-08-06 RX ORDER — MONTELUKAST SODIUM 10 MG/1
10 TABLET ORAL NIGHTLY
Qty: 90 TABLET | Refills: 2 | Status: SHIPPED | OUTPATIENT
Start: 2018-08-06 | End: 2018-09-18

## 2018-08-06 RX ORDER — ALBUTEROL SULFATE 90 UG/1
2 AEROSOL, METERED RESPIRATORY (INHALATION) EVERY 4 HOURS PRN
Qty: 1 INHALER | Refills: 11 | Status: SHIPPED | OUTPATIENT
Start: 2018-08-06 | End: 2022-01-10 | Stop reason: SDUPTHER

## 2018-08-06 RX ORDER — BUDESONIDE AND FORMOTEROL FUMARATE DIHYDRATE 160; 4.5 UG/1; UG/1
2 AEROSOL RESPIRATORY (INHALATION)
Qty: 10.2 G | Refills: 12 | Status: SHIPPED | OUTPATIENT
Start: 2018-08-06 | End: 2019-10-05 | Stop reason: SDUPTHER

## 2018-08-06 RX ORDER — LISINOPRIL AND HYDROCHLOROTHIAZIDE 12.5; 1 MG/1; MG/1
1 TABLET ORAL DAILY
Qty: 90 TABLET | Refills: 2 | Status: SHIPPED | OUTPATIENT
Start: 2018-08-06 | End: 2019-03-26 | Stop reason: SDUPTHER
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
## 2018-08-14 ENCOUNTER — HOSPITAL ENCOUNTER (OUTPATIENT)
Dept: GENERAL RADIOLOGY | Facility: HOSPITAL | Age: 73
Discharge: HOME | End: 2018-08-14

## 2018-08-14 ENCOUNTER — HOSPITAL ENCOUNTER (OUTPATIENT)
Dept: MRI IMAGING | Facility: HOSPITAL | Age: 73
Discharge: HOME | End: 2018-08-14
Admitting: FAMILY MEDICINE

## 2018-08-14 DIAGNOSIS — M47.892 OTHER OSTEOARTHRITIS OF SPINE, CERVICAL REGION: ICD-10-CM

## 2018-08-14 DIAGNOSIS — M54.2 NECK PAIN: ICD-10-CM

## 2018-08-14 PROCEDURE — 72040 X-RAY EXAM NECK SPINE 2-3 VW: CPT

## 2018-08-14 PROCEDURE — 72141 MRI NECK SPINE W/O DYE: CPT

## 2018-08-23 ENCOUNTER — TELEPHONE (OUTPATIENT)
Dept: FAMILY MEDICINE CLINIC | Facility: CLINIC | Age: 73
End: 2018-08-23

## 2018-08-23 NOTE — TELEPHONE ENCOUNTER
Patient called requesting the results of her MRI and as well as the referral for pain management here at [facility]

## 2018-08-24 DIAGNOSIS — M50.30 DEGENERATIVE DISC DISEASE, CERVICAL: Primary | ICD-10-CM

## 2018-08-24 DIAGNOSIS — M25.60 LIMITED JOINT RANGE OF MOTION: ICD-10-CM

## 2018-08-27 ENCOUNTER — OFFICE VISIT (OUTPATIENT)
Dept: FAMILY MEDICINE CLINIC | Facility: CLINIC | Age: 73
End: 2018-08-27

## 2018-08-27 VITALS
OXYGEN SATURATION: 97 % | HEART RATE: 86 BPM | WEIGHT: 188 LBS | SYSTOLIC BLOOD PRESSURE: 136 MMHG | TEMPERATURE: 97.7 F | HEIGHT: 64 IN | DIASTOLIC BLOOD PRESSURE: 70 MMHG | BODY MASS INDEX: 32.1 KG/M2

## 2018-08-27 DIAGNOSIS — S00.03XA CONTUSION OF SCALP, INITIAL ENCOUNTER: Primary | ICD-10-CM

## 2018-08-27 PROCEDURE — 99213 OFFICE O/P EST LOW 20 MIN: CPT | Performed by: NURSE PRACTITIONER

## 2018-08-27 NOTE — PATIENT INSTRUCTIONS
Discharge instructions    Increasing head pain severe persistent headache  Weakness confusion  Lethargy  Severe headache  Emergency room    Routine follow-up Dr. Urbina  And pain management
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
## 2018-09-18 ENCOUNTER — OFFICE VISIT (OUTPATIENT)
Dept: PAIN MEDICINE | Facility: CLINIC | Age: 73
End: 2018-09-18

## 2018-09-18 VITALS
HEIGHT: 64 IN | HEART RATE: 70 BPM | SYSTOLIC BLOOD PRESSURE: 139 MMHG | DIASTOLIC BLOOD PRESSURE: 82 MMHG | RESPIRATION RATE: 16 BRPM | BODY MASS INDEX: 32.44 KG/M2 | WEIGHT: 190 LBS | OXYGEN SATURATION: 94 % | TEMPERATURE: 97.8 F

## 2018-09-18 DIAGNOSIS — M47.812 CERVICAL SPONDYLOSIS WITHOUT MYELOPATHY: ICD-10-CM

## 2018-09-18 DIAGNOSIS — M54.2 CHRONIC NECK PAIN: Primary | ICD-10-CM

## 2018-09-18 DIAGNOSIS — G89.29 CHRONIC NECK PAIN: Primary | ICD-10-CM

## 2018-09-18 DIAGNOSIS — M54.81 OCCIPITAL NEURALGIA OF LEFT SIDE: ICD-10-CM

## 2018-09-18 LAB
POC AMPHETAMINES: NEGATIVE
POC BARBITURATES: NEGATIVE
POC BENZODIAZEPHINES: NEGATIVE
POC COCAINE: NEGATIVE
POC METHADONE: NEGATIVE
POC METHAMPHETAMINE SCREEN URINE: NEGATIVE
POC OPIATES: NEGATIVE
POC OXYCODONE: NEGATIVE
POC PHENCYCLIDINE: NEGATIVE
POC PROPOXYPHENE: NEGATIVE
POC THC: NEGATIVE
POC TRICYCLIC ANTIDEPRESSANTS: NEGATIVE

## 2018-09-18 PROCEDURE — 80305 DRUG TEST PRSMV DIR OPT OBS: CPT | Performed by: PAIN MEDICINE

## 2018-09-18 PROCEDURE — 99204 OFFICE O/P NEW MOD 45 MIN: CPT | Performed by: PAIN MEDICINE

## 2018-09-18 PROCEDURE — 64405 NJX AA&/STRD GR OCPL NRV: CPT | Performed by: PAIN MEDICINE

## 2018-09-18 RX ORDER — DOXEPIN HYDROCHLORIDE 10 MG/1
10 CAPSULE ORAL NIGHTLY
Qty: 30 CAPSULE | Refills: 1 | Status: SHIPPED | OUTPATIENT
Start: 2018-09-18 | End: 2018-10-02 | Stop reason: SDUPTHER

## 2018-09-18 RX ORDER — SENNOSIDES 8.6 MG
650 CAPSULE ORAL 3 TIMES DAILY
COMMUNITY

## 2018-09-18 RX ORDER — ACETAMINOPHEN,DIPHENHYDRAMINE HCL 500; 25 MG/1; MG/1
1 TABLET, FILM COATED ORAL NIGHTLY PRN
COMMUNITY
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
## 2018-09-21 ENCOUNTER — TELEPHONE (OUTPATIENT)
Dept: FAMILY MEDICINE CLINIC | Facility: CLINIC | Age: 73
End: 2018-09-21

## 2018-09-21 NOTE — TELEPHONE ENCOUNTER
Patient called stating that the sleeping aids she was prescribed is not really helping her, she was up and down for two nights and the third night she didnt go to sleep til 4 am and had to be up at 8 am. Patient wants to know if she should still take it

## 2018-09-21 NOTE — TELEPHONE ENCOUNTER
I would have her take 2 capsules tonight, if not improved take 3 capsules Saturday and Sunday night. Call Monday to let us know if this improved. Make sure going to bed in dark room, no clock, no TV, phone, computer. Thanks.

## 2018-09-23 ENCOUNTER — RESULTS ENCOUNTER (OUTPATIENT)
Dept: PAIN MEDICINE | Facility: CLINIC | Age: 73
End: 2018-09-23

## 2018-09-23 DIAGNOSIS — M47.812 CERVICAL SPONDYLOSIS WITHOUT MYELOPATHY: ICD-10-CM

## 2018-09-23 DIAGNOSIS — G89.29 CHRONIC NECK PAIN: ICD-10-CM

## 2018-09-23 DIAGNOSIS — M54.81 OCCIPITAL NEURALGIA OF LEFT SIDE: ICD-10-CM

## 2018-09-23 DIAGNOSIS — M54.2 CHRONIC NECK PAIN: ICD-10-CM

## 2018-10-02 ENCOUNTER — TELEPHONE (OUTPATIENT)
Dept: FAMILY MEDICINE CLINIC | Facility: CLINIC | Age: 73
End: 2018-10-02

## 2018-10-02 RX ORDER — DOXEPIN HYDROCHLORIDE 10 MG/1
20 CAPSULE ORAL NIGHTLY
Qty: 60 CAPSULE | Refills: 2 | Status: SHIPPED | OUTPATIENT
Start: 2018-10-02 | End: 2018-10-05 | Stop reason: SDUPTHER

## 2018-10-02 NOTE — TELEPHONE ENCOUNTER
Patient called stating that the sleeping aids she was prescribed is working well if she takes 2 tablets at night

## 2018-10-05 RX ORDER — DOXEPIN HYDROCHLORIDE 10 MG/1
20 CAPSULE ORAL NIGHTLY
Qty: 14 CAPSULE | Refills: 0 | Status: SHIPPED | OUTPATIENT
Start: 2018-10-05 | End: 2019-02-08

## 2018-10-09 ENCOUNTER — FLU SHOT (OUTPATIENT)
Dept: FAMILY MEDICINE CLINIC | Facility: CLINIC | Age: 73
End: 2018-10-09

## 2018-10-09 DIAGNOSIS — Z23 IMMUNIZATION DUE: Primary | ICD-10-CM

## 2018-10-09 PROCEDURE — 90662 IIV NO PRSV INCREASED AG IM: CPT | Performed by: FAMILY MEDICINE

## 2018-10-09 PROCEDURE — G0008 ADMIN INFLUENZA VIRUS VAC: HCPCS | Performed by: FAMILY MEDICINE

## 2018-10-30 ENCOUNTER — OFFICE VISIT (OUTPATIENT)
Dept: PAIN MEDICINE | Facility: CLINIC | Age: 73
End: 2018-10-30

## 2018-10-30 VITALS
OXYGEN SATURATION: 96 % | WEIGHT: 189.4 LBS | HEIGHT: 64 IN | BODY MASS INDEX: 32.33 KG/M2 | DIASTOLIC BLOOD PRESSURE: 74 MMHG | HEART RATE: 77 BPM | TEMPERATURE: 98 F | SYSTOLIC BLOOD PRESSURE: 138 MMHG | RESPIRATION RATE: 18 BRPM

## 2018-10-30 DIAGNOSIS — M47.812 CERVICAL SPONDYLOSIS WITHOUT MYELOPATHY: ICD-10-CM

## 2018-10-30 DIAGNOSIS — M54.81 OCCIPITAL NEURALGIA OF LEFT SIDE: ICD-10-CM

## 2018-10-30 DIAGNOSIS — G89.29 CHRONIC NECK PAIN: Primary | ICD-10-CM

## 2018-10-30 DIAGNOSIS — M54.2 CHRONIC NECK PAIN: Primary | ICD-10-CM

## 2018-10-30 PROCEDURE — 99213 OFFICE O/P EST LOW 20 MIN: CPT | Performed by: PAIN MEDICINE

## 2018-10-30 PROCEDURE — 64405 NJX AA&/STRD GR OCPL NRV: CPT | Performed by: PAIN MEDICINE

## 2018-10-30 NOTE — PROGRESS NOTES
CHIEF COMPLAINT: Neck Pain    HPI  Deanne Upton is a 73 y.o. female.  She is here to follow up for Neck Pain    Deanne Upton is a 73 y.o. female  who presents to the office for follow-up.    Since last visit their pain has worsened in the last week. Left sided OCN helped approx 50-60% and lasted several weeks. Pain gradually returning and now back to baseline.     The patient states their pain is a 6 on a scale of 1-10.  The patient describes this pain as constant dull, ache and pressure.  The pain is located in left neck and does radiate posterior left shoulder, head. This painful problem is aggravated by physical activity and turning head and is alleviated by past injection and pain medication.    Past pain medications:   no opiates (Pt claims opiates cause nausea,headache).  NSAIDS- allergy  tramadol     Current pain medications:   Tylenol 650 mg 5/day  *not interested in opioids      Past therapies:  Physical Therapy: no  Chiropractor: no  Massage Therapy: no  TENS: yes  Neck or back surgery: no  Past pain management: no     Previous Injections: left sided OCN - greater and lesser - 9/18/2018  50-60% for 4-6 week    PEG Assessment   What number best describes your pain on average in the past week? 6  What number best describes how, during the past week, pain has interfered with your enjoyment of life? 8  What number best describes how, during the past week, pain has interfered with your general activity? 7      Current Outpatient Prescriptions:   •  acetaminophen (TYLENOL) 650 MG 8 hr tablet, Take 650 mg by mouth 5 (Five) Times a Day., Disp: , Rfl:   •  albuterol (PROVENTIL HFA;VENTOLIN HFA) 108 (90 Base) MCG/ACT inhaler, Inhale 2 puffs Every 4 (Four) Hours As Needed for Wheezing., Disp: 1 inhaler, Rfl: 11  •  azelastine (ASTELIN) 0.1 % nasal spray, 2 sprays into each nostril 2 (Two) Times a Day. Use in each nostril as directed, Disp: 1 each, Rfl: 12  •  B Complex-C-Folic Acid (B COMPLEX + C TR) tablet  controlled-release, Take by mouth., Disp: , Rfl:   •  budesonide-formoterol (SYMBICORT) 160-4.5 MCG/ACT inhaler, Inhale 2 puffs 2 (Two) Times a Day., Disp: 10.2 g, Rfl: 12  •  Cetirizine HCl 10 MG capsule, Take by mouth., Disp: , Rfl:   •  Cholecalciferol (VITAMIN D) 2000 UNITS capsule, Take by mouth., Disp: , Rfl:   •  diphenhydrAMINE-acetaminophen (TYLENOL PM)  MG tablet per tablet, Take 1 tablet by mouth At Night As Needed for Sleep., Disp: , Rfl:   •  doxepin (SINEquan) 10 MG capsule, Take 2 capsules by mouth Every Night. 30-60 min prior to bed, Disp: 14 capsule, Rfl: 0  •  lisinopril-hydrochlorothiazide (PRINZIDE,ZESTORETIC) 10-12.5 MG per tablet, Take 1 tablet by mouth Daily., Disp: 90 tablet, Rfl: 2  •  metFORMIN (GLUCOPHAGE) 500 MG tablet, Take 2 tablets by mouth 2 (Two) Times a Day., Disp: 360 tablet, Rfl: 2  •  Multiple Vitamin (MULTI VITAMIN DAILY PO), Take by mouth., Disp: , Rfl:   •  Omega-3 Fatty Acids (FISH OIL) 600 MG capsule, Take by mouth., Disp: , Rfl:   •  Peak Flow Meter device, Use to assess breathing, Disp: 1 each, Rfl: 0  •  simvastatin (ZOCOR) 10 MG tablet, TAKE 1 TABLET BY MOUTH  EVERY NIGHT., Disp: 90 tablet, Rfl: 1  •  Spacer/Aero Chamber Mouthpiece misc, Use with inhalers for every breathing treatment, Disp: 1 each, Rfl: 1    IMAGING  Cervical MRI - 8/14/2018  IMPRESSION:  Multilevel degenerative disease involving the cervical spine  including disc osteophyte complexes at C5-6 and to a lesser extent C4-5  resulting in flattening of the anterolateral aspect of the cord to the  left. At C5-6 there is moderate canal stenosis. Multilevel neural  foraminal compromise is appreciated also most prominent at C5-6. There  is no evidence of a focal herniation or of cord signal abnormality. See  Above.     XR SPINE CERVICAL 2 OR 3 VW- 8/14/2018  FINDINGS:   Alignment is preserved. Reversed curve of the cervical spine appears  chronic. Disc space narrowing is present at C4-C7. Multilevel  "endplate  spurring and facet arthropathy. Vertebral body heights are preserved. No  acute fracture is identified. No abnormal prevertebral soft tissue  swelling.  IMPRESSION:  No acute fracture is identified. Degenerative changes. For further  evaluation, if clinically, MRI could be considered.    Imaging last reviewed: 10/30/18     PFSH:  The following portions of the patient's history were reviewed and updated as appropriate: problem list, past medical history, past surgery history, social history, family history, medications, and allergies    Review of Systems   Constitutional: Negative for fatigue.   HENT: Positive for congestion.    Eyes: Negative for visual disturbance.   Respiratory: Negative for cough, shortness of breath and wheezing.    Cardiovascular: Negative.    Gastrointestinal: Negative for constipation and diarrhea.   Genitourinary: Negative for difficulty urinating.   Musculoskeletal: Positive for neck pain.   Neurological: Negative for weakness and numbness.   Psychiatric/Behavioral: Positive for sleep disturbance. Negative for suicidal ideas. The patient is nervous/anxious.    All other systems reviewed and are negative.      Vitals:    10/30/18 1053   BP: 138/74   Pulse: 77   Resp: 18   Temp: 98 °F (36.7 °C)   SpO2: 96%   Weight: 85.9 kg (189 lb 6.4 oz)   Height: 162.6 cm (64.02\")   PainSc:   6   PainLoc: Neck       Physical Exam   Constitutional: She appears well-developed and well-nourished. No distress.   HENT:   Head: Normocephalic and atraumatic.   Nose: Nose normal.   Mouth/Throat: Oropharynx is clear and moist.   Eyes: Conjunctivae and EOM are normal.   Neck: Normal range of motion. Neck supple.   Pulmonary/Chest: Effort normal. No stridor. No respiratory distress.   Musculoskeletal:        Left shoulder: She exhibits normal range of motion and no pain.        Cervical back: She exhibits decreased range of motion, tenderness and pain.   +pain with palpation of left greater and lesser OCN "   Neurological: She is alert. She has normal strength. No cranial nerve deficit or sensory deficit.   Skin: Skin is warm and dry. No rash noted. She is not diaphoretic.   Psychiatric: She has a normal mood and affect. Her speech is normal and behavior is normal.   Nursing note and vitals reviewed.    Ortho Exam  Neurologic Exam     Mental Status   Speech: speech is normal     Cranial Nerves     CN III, IV, VI   Extraocular motions are normal.     Motor Exam     Strength   Strength 5/5 throughout.       Lab Results   Component Value Date    POCMETH Negative 09/18/2018    POCAMPHET Negative 09/18/2018    POCBARBITUR Negative 09/18/2018    POCBENZO Negative 09/18/2018    POCCOCAINE Negative 09/18/2018    POCMETHADO Negative 09/18/2018    POCOPIATES Negative 09/18/2018    POCOXYCODO Negative 09/18/2018    POCPHENCYC Negative 09/18/2018    POCPROPOXY Negative 09/18/2018    POCTHC Negative 09/18/2018    POCTRICYC Negative 09/18/2018     Last UDS results reviewed: 10/30/18   Last UDS: 9/18/2018  Comments: Consistent     Date of last YURIDIA reviewed : 10/30/18   Comments: Consistent         Deanne was seen today for neck pain.    Diagnoses and all orders for this visit:    Chronic neck pain    Occipital neuralgia of left side    Cervical spondylosis without myelopathy    Other orders  -     Nerve Block      Requested Prescriptions      No prescriptions requested or ordered in this encounter     - Last UDS performed was reviewed and consistent.  No confirmation in chart, will call and obtain. poc normal.   - Imaging reviewed with patient.  Degenerative changes seen but no herniations or misalignments. Would explain why her pain is mainly axial and no radicular symptoms.   - would likely benefit from NSAIDS but has to avoid due to allergy.   - can not tolerate opioids.   - continues to have two main problems.   - cervical spondylosis. Can perform left sided cervical medial branches. Wants to continue to hold off on this given  "her pain pain is her occipital nerve pain. Will discuss at next visit dpending on results from occipital nerve block. Given pain radiates down into left shoulder, would perform on lower cervical levels.   - 2nd issue is left sided occipital neuralgia:  - good relief with left sided OCN - pain slowly returning, will repeat.   - Discussed with the patient regarding the etiology of their pain. Informed them that they would likely benefit from a  Left greater and lesser occipital nerve steroid injection.  The procedure was described in detail and the risks, benefits and alternatives were discussed with the patient (including but not limited to: bleeding, infection, nerve damage, worsening of pain, inability to perform injection) who agreed to proceed.   will perform in office without sedation today.   - if no improvement with OCN, can perform left sided cervical MBB but higher up to include the origin of the occipital nerve. Given her arthritis, it may be irritated at bone level.     - if no improvement with this 2nd injection, she is to call and we can perform left sided CMBB.   - also, if continued no improvement, next step would be a surgical evaluation. She is going to consider.      Left greater and lesser OCN  Date/Time: 10/30/2018 11:26 AM  Performed by: KEEGAN GARCIA  Authorized by: KEEGAN GARCIA   Consent: Verbal consent obtained. Written consent obtained.  Risks and benefits: risks, benefits and alternatives were discussed  Consent given by: patient  Patient understanding: patient states understanding of the procedure being performed  Patient consent: the patient's understanding of the procedure matches consent given  Patient identity confirmed: verbally with patient  Time out: Immediately prior to procedure a \"time out\" was called to verify the correct patient, procedure, equipment, support staff and site/side marked as required.  Indications: pain relief  Needle size: 25 G  Location " technique: anatomical landmarks  Local Anesthetic: bupivacaine 0.25% without epinephrine  Anesthetic total: 9 mL  Outcome: pain improved  Patient tolerance: Patient tolerated the procedure well with no immediate complications        Bupivacaine Lot#: [id number] Exp: 8/2019  Depo Medrol 40 mg Lot#: [id number] Exp: 4/2021     Wt Readings from Last 3 Encounters:   10/30/18 85.9 kg (189 lb 6.4 oz)   09/18/18 86.2 kg (190 lb)   08/27/18 85.3 kg (188 lb)     Body mass index is 32.49 kg/m².  weight loss of 1 lbs over the past 6 week(s)    Intentional?  Yes Patient counseled on the importance of weight loss to help with overall health and pain control. Patient instructed to attempt weight loss.   Plan: Calorie counting  reduce portion size    Follow-up prn    Robyn Voss MD  Pain Management

## 2018-11-26 ENCOUNTER — TELEPHONE (OUTPATIENT)
Dept: PAIN MEDICINE | Facility: CLINIC | Age: 73
End: 2018-11-26

## 2018-11-26 DIAGNOSIS — M54.81 OCCIPITAL NEURALGIA OF LEFT SIDE: ICD-10-CM

## 2018-11-26 DIAGNOSIS — G89.29 CHRONIC NECK PAIN: Primary | ICD-10-CM

## 2018-11-26 DIAGNOSIS — M54.2 CHRONIC NECK PAIN: Primary | ICD-10-CM

## 2018-12-03 ENCOUNTER — OUTSIDE FACILITY SERVICE (OUTPATIENT)
Dept: PAIN MEDICINE | Facility: CLINIC | Age: 73
End: 2018-12-03

## 2018-12-03 ENCOUNTER — DOCUMENTATION (OUTPATIENT)
Dept: INTERNAL MEDICINE | Facility: CLINIC | Age: 73
End: 2018-12-03

## 2018-12-03 PROCEDURE — 64490 INJ PARAVERT F JNT C/T 1 LEV: CPT | Performed by: PAIN MEDICINE

## 2018-12-03 PROCEDURE — 64492 INJ PARAVERT F JNT C/T 3 LEV: CPT | Performed by: PAIN MEDICINE

## 2018-12-03 PROCEDURE — 64491 INJ PARAVERT F JNT C/T 2 LEV: CPT | Performed by: PAIN MEDICINE

## 2018-12-03 NOTE — PROGRESS NOTES
LEFT C3-6 Cervical Medial Branch Blockade  [facility]      PREOPERATIVE DIAGNOSIS:  Cervical spondylosis without myelopathy   POSTOPERATIVE DIAGNOSIS:  Same as preoperative diagnosis    PROCEDURE:    Cervical Facet Nerve (medial branch) Blocks, with Fluoroscopy:  LEVELS C3, C4, C5, and C6, to block facet joints C3-4, C4-5, and C5-6 on the LEFT  • 27789 - Cervical Facet block, 1st level  • 98093 - Cervical Facet block, 2nd level  • 65273 - Cervical Facet block, 3rd level    PRE-PROCEDURE DISCUSSION WITH PATIENT:    Risks and complications were discussed with the patient prior to starting the procedure and informed consent was obtained.    SURGEON:  Robyn Voss MD    REASON FOR PROCEDURE:    The patient complains of pain that seems to have a significant axial component Increased neck pain on range of motion exams Pain on extension of the cervical spine    SEDATION:  Patient declined administration of moderate sedation    ANESTHETIC:   Marcaine 0.25%  STEROID:  Dexamethasone 8mg  TOTAL VOLUME OF SOLUTION:  4 mL    DESCRIPTON OF PROCEDURE:  After obtaining informed consent, the patient was placed in the prone position. IV access was obtained.  EKG, blood pressure, and pulse oximeter were monitored and all sedation was administered by an RN under my direct guidance.  The cervical area was prepped with Chloraprep and draped with sterile barrier. Under fluoroscopic guidance the waists of the C3 through C6 vertebra on the affected side were identified. Skin and subcutaneous tissue was then anesthetized with 1% lidocaine 1mL at each point. Then spinal needles were introduced under fluoroscopic guidance at the waist of these vertebra on this side. After confirming the position of the needle under fluoroscopic PA and lateral views, and confirming negative aspiration of blood and CSF, 0.25 mL of Omnipaque was injected.  Proper spread and lack of vascular uptake was demonstrated.  A solution was  prepared as above, and 1 mL of that solution was injected very slowly each level.      Needles were removed intact from all levels.  Vitals were stable throughout.     ESTIMATED BLOOD LOSS:  minimal  SPECIMENS:  None    COMPLICATIONS:    No complications were noted.    TOLERANCE & DISCHARGE CONDITION:    The patient tolerated the procedure well.  The patient was transported to the recovery area without difficulties.  The patient was discharged to home under the care of family in stable and satisfactory condition.      PLAN OF CARE:  1. The patient was given our standard instruction sheet.  2. We discussed that Cervical Medial Branch Blockade is a diagnostic procedure in consideration for radiofrequency ablation if two diagnostic procedures proved to be positive for significant benefit.  If sustained relief of six to eight weeks is obtained, then an alternative plan could be therapeutic cervical medial branch blocks on an as-needed basis.  3. The patient is asked to keep a pain log hourly for 8 hours postoperatively today.  4. The patient will Return to clinic 4 wks.  5. The patient will resume all medications as per the medication reconciliation sheet.

## 2018-12-07 ENCOUNTER — CLINICAL SUPPORT (OUTPATIENT)
Dept: ORTHOPEDIC SURGERY | Facility: CLINIC | Age: 73
End: 2018-12-07

## 2018-12-07 VITALS — BODY MASS INDEX: 31.58 KG/M2 | HEIGHT: 64 IN | WEIGHT: 185 LBS | TEMPERATURE: 98.4 F

## 2018-12-07 DIAGNOSIS — M17.0 BILATERAL PRIMARY OSTEOARTHRITIS OF KNEE: Primary | ICD-10-CM

## 2018-12-07 PROCEDURE — 20610 DRAIN/INJ JOINT/BURSA W/O US: CPT | Performed by: NURSE PRACTITIONER

## 2018-12-07 RX ORDER — METHYLPREDNISOLONE ACETATE 80 MG/ML
80 INJECTION, SUSPENSION INTRA-ARTICULAR; INTRALESIONAL; INTRAMUSCULAR; SOFT TISSUE
Status: COMPLETED | OUTPATIENT
Start: 2018-12-07 | End: 2018-12-07

## 2018-12-07 RX ORDER — LIDOCAINE HYDROCHLORIDE 10 MG/ML
2 INJECTION, SOLUTION EPIDURAL; INFILTRATION; INTRACAUDAL; PERINEURAL
Status: COMPLETED | OUTPATIENT
Start: 2018-12-07 | End: 2018-12-07

## 2018-12-07 RX ADMIN — LIDOCAINE HYDROCHLORIDE 2 ML: 10 INJECTION, SOLUTION EPIDURAL; INFILTRATION; INTRACAUDAL; PERINEURAL at 14:49

## 2018-12-07 RX ADMIN — METHYLPREDNISOLONE ACETATE 80 MG: 80 INJECTION, SUSPENSION INTRA-ARTICULAR; INTRALESIONAL; INTRAMUSCULAR; SOFT TISSUE at 14:49

## 2018-12-07 RX ADMIN — LIDOCAINE HYDROCHLORIDE 2 ML: 10 INJECTION, SOLUTION EPIDURAL; INFILTRATION; INTRACAUDAL; PERINEURAL at 14:48

## 2018-12-07 RX ADMIN — METHYLPREDNISOLONE ACETATE 80 MG: 80 INJECTION, SUSPENSION INTRA-ARTICULAR; INTRALESIONAL; INTRAMUSCULAR; SOFT TISSUE at 14:48

## 2018-12-07 NOTE — PROGRESS NOTES
12/7/2018    Deanne Upton is here today for worsening knee pain. Pt has undergone injection of the knee in the past with good resolution of symptoms. Pt is requesting a repeat injection.     KNEE Injection Procedure Note:    Large Joint Arthrocentesis: R knee  Date/Time: 12/7/2018 2:48 PM  Consent given by: patient  Site marked: site marked  Timeout: Immediately prior to procedure a time out was called to verify the correct patient, procedure, equipment, support staff and site/side marked as required   Supporting Documentation  Indications: pain   Procedure Details  Location: knee - R knee  Preparation: Patient was prepped and draped in the usual sterile fashion  Needle size: 25 G  Approach: anteromedial  Medications administered: 80 mg methylPREDNISolone acetate 80 MG/ML; 2 mL lidocaine PF 1% 1 %      Large Joint Arthrocentesis: L knee  Date/Time: 12/7/2018 2:49 PM  Consent given by: patient  Site marked: site marked  Timeout: Immediately prior to procedure a time out was called to verify the correct patient, procedure, equipment, support staff and site/side marked as required   Supporting Documentation  Indications: pain   Procedure Details  Location: knee - L knee  Preparation: Patient was prepped and draped in the usual sterile fashion  Needle size: 25 G  Approach: anteromedial  Medications administered: 80 mg methylPREDNISolone acetate 80 MG/ML; 2 mL lidocaine PF 1% 1 %  Patient tolerance: patient tolerated the procedure well with no immediate complications      Prior to injections risks were discussed including pain, infection, elevated blood sugar.  Patient verbalized understanding would like to proceed with injections    At the conclusion of the injection I discussed the importance of continued quad strengthening exercises on a daily basis. I will see the patient back if the symptoms should fail to improve or worsen.    Tianna Hale APRN  12/7/2018

## 2019-01-07 ENCOUNTER — OFFICE VISIT (OUTPATIENT)
Dept: PAIN MEDICINE | Facility: CLINIC | Age: 74
End: 2019-01-07

## 2019-01-07 VITALS
BODY MASS INDEX: 31.07 KG/M2 | SYSTOLIC BLOOD PRESSURE: 136 MMHG | RESPIRATION RATE: 16 BRPM | TEMPERATURE: 98.7 F | OXYGEN SATURATION: 96 % | HEART RATE: 85 BPM | DIASTOLIC BLOOD PRESSURE: 82 MMHG | HEIGHT: 64 IN | WEIGHT: 182 LBS

## 2019-01-07 DIAGNOSIS — M54.2 CHRONIC NECK PAIN: ICD-10-CM

## 2019-01-07 DIAGNOSIS — M54.81 OCCIPITAL NEURALGIA OF LEFT SIDE: ICD-10-CM

## 2019-01-07 DIAGNOSIS — G89.29 OTHER CHRONIC PAIN: Primary | ICD-10-CM

## 2019-01-07 DIAGNOSIS — M47.812 CERVICAL SPONDYLOSIS WITHOUT MYELOPATHY: ICD-10-CM

## 2019-01-07 DIAGNOSIS — G89.29 CHRONIC NECK PAIN: ICD-10-CM

## 2019-01-07 PROCEDURE — 99212 OFFICE O/P EST SF 10 MIN: CPT | Performed by: NURSE PRACTITIONER

## 2019-01-07 NOTE — PROGRESS NOTES
CHIEF COMPLAINT  Pt states her neck pain,L > R side,is improved significantly since  12/3/18 Cervical MBB on L side.    Subjective   Deanne Upton is a 73 y.o. female  who presents to the office for follow-up.She has a history of neck pain.    Left ONB - 10/30/18 -- Did not help    Left C3-C6 MBB 12/3/18 -- significant benefit, reports almost 100% relief for the week after the injection with significant ongoing benefit.      Neck Pain    This is a chronic problem. The current episode started more than 1 year ago. The problem occurs daily. The problem has been gradually improving. The pain is present in the left side. The pain is at a severity of 2/10. The pain is mild. The symptoms are aggravated by position (tension). Associated symptoms include headaches (mild). Pertinent negatives include no chest pain, numbness or weakness. She has tried acetaminophen for the symptoms. The treatment provided significant (cervical MBB) relief.      Past pain medications:   no opiates (Pt claims opiates cause nausea,headache).  NSAIDS- allergy  tramadol     Current pain medications:   Tylenol 650 mg 5/day  *not interested in opioids      Past therapies:  Physical Therapy: no  Chiropractor: no  Massage Therapy: no  TENS: yes  Neck or back surgery: no  Past pain management: no     Previous Injections: left sided OCN - greater and lesser - 9/18/2018 and 10/30/18  50-60% for 4-6 week - first. No relief from second    Cervical MBB 12/3/18 - significant relief, ongoing     PEG Assessment   What number best describes your pain on average in the past week?2  What number best describes how, during the past week, pain has interfered with your enjoyment of life?2  What number best describes how, during the past week, pain has interfered with your general activity?  2      The following portions of the patient's history were reviewed and updated as appropriate: allergies, current medications, past family history, past medical history, past  "social history, past surgical history and problem list.    Review of Systems   Constitutional: Positive for activity change (increased). Negative for fatigue.   HENT: Negative for congestion.    Eyes: Negative for visual disturbance.   Respiratory: Negative for cough, shortness of breath and wheezing.    Cardiovascular: Negative.  Negative for chest pain.   Gastrointestinal: Negative for constipation, diarrhea and nausea.   Genitourinary: Negative for difficulty urinating.   Musculoskeletal: Positive for neck pain.   Neurological: Positive for headaches (mild). Negative for dizziness, weakness, light-headedness and numbness.   Psychiatric/Behavioral: Positive for sleep disturbance. Negative for suicidal ideas. The patient is nervous/anxious.    All other systems reviewed and are negative.      Vitals:    01/07/19 1323   BP: 136/82   Pulse: 85   Resp: 16   Temp: 98.7 °F (37.1 °C)   SpO2: 96%   Weight: 82.6 kg (182 lb)   Height: 162.6 cm (64.02\")   PainSc:   2   PainLoc: Neck  Comment: neck pain ranges from 1-8/10         Objective   Physical Exam   Constitutional: She is oriented to person, place, and time. She appears well-developed and well-nourished. No distress.   HENT:   Head: Normocephalic and atraumatic.   Eyes: Conjunctivae and EOM are normal.   Neck: Neck supple.   Cardiovascular: Normal rate.   Pulmonary/Chest: Effort normal. No respiratory distress.   Musculoskeletal:        Cervical back: She exhibits tenderness and bony tenderness.   Neurological: She is alert and oriented to person, place, and time. She has normal strength. No sensory deficit. Coordination and gait normal.   Skin: Skin is warm and dry.   Psychiatric: She has a normal mood and affect. Her behavior is normal.   Nursing note and vitals reviewed.    Assessment/Plan   Deanne was seen today for neck pain.    Diagnoses and all orders for this visit:    Other chronic pain    Chronic neck pain    Occipital neuralgia of left side    Cervical " spondylosis without myelopathy      --- Repeat Left cervical MBB as needed - ongoing therapeutic relief at this time 5 weeks out from procedure  --- Follow-up as needed          YURIDIA REPORT  YURIDIA report has been reviewed and scanned into the patient's chart.    As the clinician, I personally reviewed the YURIDIA from 1/4/2019 while the patient was in the office today.    EMR Dragon/Transcription disclaimer:   Much of this encounter note is an electronic transcription/translation of spoken language to printed text. The electronic translation of spoken language may permit erroneous, or at times, nonsensical words or phrases to be inadvertently transcribed; Although I have reviewed the note for such errors, some may still exist.

## 2019-01-07 NOTE — PATIENT INSTRUCTIONS
"-------  Education about Medial Branch Blockade and RF Therapy:    This medial branch blockade (MBB) suggested is intended for diagnostic purposes, with the intent of offering the patient Radiofrequency thermal rhizotomy (RF) if the MBB is diagnostically effective.  The diagnostic blockade is necessary to determine the likelihood that RF therapy could be efficacious in providing long term relief to the patient.    Medial branches are sensory nerve branches that connect to a facet joint and transmit sensations & pain signals from that joint.  Facet is a term for the type of joints found in the spine.  Medial branches are the nerves that go to a facet, and therefore are also sometimes called \"facet joint nerves\" (FJNs).      In a medial branch blockade procedure, xray fluoroscopy is used to verify the locations of the outside of the joint lines which are being targeted.  Under xray guidance, needles are placed to these areas.  Contrast dye is injected to confirm proper placement, with dye flowing over the joint area, and to ensure that the dye does not flow into unintended areas such as a vein.  When this is confirmed, local anesthetic is injected to block the medial branch at that joint level.      If MBBs are diagnostically successful in blocking pain, then the patient is most likely a great candidate for Radiofrequency of those facet joint nerves.  In the RF procedure, needles are placed to the joint lines in the same fashion, and after testing, the needle tips are heated to thermally treat the nerves, blocking the nerves by in essence damaging the nerves with the heat treatment.       Medically, a successful RF procedure should provide a patient with 50% pain relief or more for at least 6 months.  Clinical experience suggests that successful patients receive relief more in the range of 12 months on average.  We also discussed that a fortunate minority of patients receive therapeutic success from the MBB, and may " not require RF ablation.  If a patient receives more than 8 weeks of relief from MBB, then occasional repeat MBB for therapeutic purposes is a very reasonable alternative therapy.  This course of therapy is consistent with our LCDs according to our CMS  in the area, and therefore other insurance providers should follow accordingly.  We will monitor our patients to screen for these therapeutic responders and will offer RF therapy only when necessary.        We discussed that MBB & RF are not without risks.  Guidelines regarding anticoagulant use & neuraxial procedures will be respected.  Patients that are ill or otherwise may be at risk for sepsis will not have their spines accessed by neuraxial injections of any type.  This patient will not be offered these therapies if there is an increased risk.   We discussed that there is a risk of postprocedural pain and also a risk of worsening of clinical picture with these procedures as with any neuraxial procedure.    -------

## 2019-01-15 RX ORDER — SIMVASTATIN 10 MG
TABLET ORAL
Qty: 90 TABLET | Refills: 1 | Status: SHIPPED | OUTPATIENT
Start: 2019-01-15 | End: 2020-02-03

## 2019-01-16 ENCOUNTER — TELEPHONE (OUTPATIENT)
Dept: PAIN MEDICINE | Facility: CLINIC | Age: 74
End: 2019-01-16

## 2019-01-16 DIAGNOSIS — G89.29 CHRONIC NECK PAIN: Primary | ICD-10-CM

## 2019-01-16 DIAGNOSIS — M47.812 CERVICAL SPONDYLOSIS WITHOUT MYELOPATHY: ICD-10-CM

## 2019-01-16 DIAGNOSIS — M54.2 CHRONIC NECK PAIN: Primary | ICD-10-CM

## 2019-01-16 DIAGNOSIS — M54.81 OCCIPITAL NEURALGIA OF LEFT SIDE: ICD-10-CM

## 2019-01-16 NOTE — TELEPHONE ENCOUNTER
Patient states that her neck pain is back and would like to repeat the injection left C2-C6 CMBB.

## 2019-01-30 DIAGNOSIS — E11.8 TYPE 2 DIABETES MELLITUS WITH COMPLICATION, WITHOUT LONG-TERM CURRENT USE OF INSULIN (HCC): Primary | ICD-10-CM

## 2019-01-30 DIAGNOSIS — E78.1 PURE HYPERGLYCERIDEMIA: ICD-10-CM

## 2019-01-30 DIAGNOSIS — E11.8 TYPE 2 DIABETES MELLITUS WITH COMPLICATION, WITHOUT LONG-TERM CURRENT USE OF INSULIN (HCC): ICD-10-CM

## 2019-01-31 LAB
ALBUMIN SERPL-MCNC: 4.2 G/DL (ref 3.5–5.2)
ALBUMIN/GLOB SERPL: 1.7 G/DL
ALP SERPL-CCNC: 74 U/L (ref 39–117)
ALT SERPL-CCNC: 20 U/L (ref 1–33)
AST SERPL-CCNC: 20 U/L (ref 1–32)
BILIRUB SERPL-MCNC: 0.2 MG/DL (ref 0.1–1.2)
BUN SERPL-MCNC: 17 MG/DL (ref 8–23)
BUN/CREAT SERPL: 16.8 (ref 7–25)
CALCIUM SERPL-MCNC: 9.8 MG/DL (ref 8.6–10.5)
CHLORIDE SERPL-SCNC: 104 MMOL/L (ref 98–107)
CHOLEST SERPL-MCNC: 139 MG/DL (ref 0–200)
CO2 SERPL-SCNC: 24.5 MMOL/L (ref 22–29)
CREAT SERPL-MCNC: 1.01 MG/DL (ref 0.57–1)
GLOBULIN SER CALC-MCNC: 2.5 GM/DL
GLUCOSE SERPL-MCNC: 127 MG/DL (ref 65–99)
HBA1C MFR BLD: 6.11 % (ref 4.8–5.6)
HDLC SERPL-MCNC: 44 MG/DL (ref 40–60)
LDLC SERPL CALC-MCNC: 68 MG/DL (ref 0–100)
LDLC/HDLC SERPL: 1.54 {RATIO}
POTASSIUM SERPL-SCNC: 4 MMOL/L (ref 3.5–5.2)
PROT SERPL-MCNC: 6.7 G/DL (ref 6–8.5)
SODIUM SERPL-SCNC: 144 MMOL/L (ref 136–145)
TRIGL SERPL-MCNC: 137 MG/DL (ref 0–150)
VLDLC SERPL CALC-MCNC: 27.4 MG/DL (ref 5–40)

## 2019-02-04 ENCOUNTER — TELEPHONE (OUTPATIENT)
Dept: PAIN MEDICINE | Facility: CLINIC | Age: 74
End: 2019-02-04

## 2019-02-06 DIAGNOSIS — E11.8 DIABETIC COMPLICATION (HCC): ICD-10-CM

## 2019-02-08 ENCOUNTER — OFFICE VISIT (OUTPATIENT)
Dept: FAMILY MEDICINE CLINIC | Facility: CLINIC | Age: 74
End: 2019-02-08

## 2019-02-08 VITALS
HEART RATE: 78 BPM | DIASTOLIC BLOOD PRESSURE: 74 MMHG | SYSTOLIC BLOOD PRESSURE: 132 MMHG | HEIGHT: 64 IN | WEIGHT: 193.7 LBS | RESPIRATION RATE: 18 BRPM | BODY MASS INDEX: 33.07 KG/M2 | OXYGEN SATURATION: 98 % | TEMPERATURE: 97.8 F

## 2019-02-08 DIAGNOSIS — I10 ESSENTIAL HYPERTENSION: ICD-10-CM

## 2019-02-08 DIAGNOSIS — Z12.39 SCREENING FOR BREAST CANCER: Primary | ICD-10-CM

## 2019-02-08 DIAGNOSIS — E11.8 TYPE 2 DIABETES MELLITUS WITH COMPLICATION, WITHOUT LONG-TERM CURRENT USE OF INSULIN (HCC): ICD-10-CM

## 2019-02-08 PROCEDURE — 99214 OFFICE O/P EST MOD 30 MIN: CPT | Performed by: FAMILY MEDICINE

## 2019-02-08 RX ORDER — MONTELUKAST SODIUM 10 MG/1
10 TABLET ORAL NIGHTLY
COMMUNITY
Start: 2019-02-05 | End: 2020-02-03

## 2019-02-08 NOTE — PROGRESS NOTES
"Chief Complaint   Patient presents with   • Diabetes     f/u    • Labs Only     bi-yearly labs        Subjective   Deanne Upton is a 73 y.o. female.     History of Present Illness   DM  She does not been checking her sugars. She just has dry moujth over night no polyuria. Eye associated in [employer] [place], sees Dr. Russo. Foot exam due. Immunizations are UTD.     Getting shots in her neck and really helped. Wearing off and going to get another. Seen by Dr. Voss.     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 Seen by Dr. Kent.     The following portions of the patient's history were reviewed and updated as appropriate: allergies, current medications, past medical history, past social history and problem list.    Review of Systems   Respiratory: Negative.    Cardiovascular: Negative.    Endocrine: Negative.        Vitals:    02/08/19 1007   BP: 132/74   BP Location: Left arm   Patient Position: Sitting   Cuff Size: Adult   Pulse: 78   Resp: 18   Temp: 97.8 °F (36.6 °C)   TempSrc: Oral   SpO2: 98%   Weight: 87.9 kg (193 lb 11.2 oz)   Height: 162.6 cm (64.02\")       Objective   Physical Exam   Constitutional: She is oriented to person, place, and time. She appears well-nourished. No distress.   Eyes: Conjunctivae are normal. Right eye exhibits no discharge. Left eye exhibits no discharge. No scleral icterus.   Cardiovascular: Normal rate, regular rhythm, normal heart sounds and intact distal pulses. Exam reveals no gallop and no friction rub.   No murmur heard.  Pulmonary/Chest: Effort normal and breath sounds normal. No respiratory distress. She has no wheezes.   Musculoskeletal: She exhibits no edema.    Deanne had a diabetic foot exam performed today.   During the foot exam she had a monofilament test performed.  Vascular Status -  Her right foot exhibits abnormal foot vasculature . Her right foot exhibits no edema. Her left foot exhibits abnormal foot vasculature . Her left foot exhibits no " edema.  Skin Integrity  -  Her right foot skin is not intact. She has callous right foot, right heel is dry and cracked and right foot warmth.  She has no right foot onychomycosis, no right foot ulcer, no ingrown toenail on right foot and no right foot blister.  Her left foot skin is not intact.She has callous left foot, left heel dry and cracked and left foot warmth. She has no left foot onychomycosis, no left foot ulcer, no left ingrown toenail and no left foot blister..   Foot Structure and Biomechanics -  Her right foot has no hallux valgus and no hammer toes present. Her left foot has no hallux valgus and no hammer toes.  Neurological: She is alert and oriented to person, place, and time.   Psychiatric: She has a normal mood and affect. Her behavior is normal.   Vitals reviewed.      Assessment/Plan   Deanne was seen today for diabetes and labs only.    Diagnoses and all orders for this visit:    Screening for breast cancer  -     Mammo Screening Bilateral With CAD; Future    Type 2 diabetes mellitus with complication, without long-term current use of insulin (CMS/[facility])    Essential hypertension    send fax for mammo order to [facility] and request last year's results.     We reviewed her lab work from last week. She is doing very well. Renal function is stable.

## 2019-02-12 ENCOUNTER — TRANSCRIBE ORDERS (OUTPATIENT)
Dept: ADMINISTRATIVE | Facility: HOSPITAL | Age: 74
End: 2019-02-12

## 2019-02-12 DIAGNOSIS — Z12.31 SCREENING MAMMOGRAM, ENCOUNTER FOR: Primary | ICD-10-CM

## 2019-02-13 ENCOUNTER — DOCUMENTATION (OUTPATIENT)
Dept: PAIN MEDICINE | Facility: CLINIC | Age: 74
End: 2019-02-13

## 2019-02-13 ENCOUNTER — OUTSIDE FACILITY SERVICE (OUTPATIENT)
Dept: PAIN MEDICINE | Facility: CLINIC | Age: 74
End: 2019-02-13

## 2019-02-13 PROCEDURE — 64492 INJ PARAVERT F JNT C/T 3 LEV: CPT | Performed by: PAIN MEDICINE

## 2019-02-13 PROCEDURE — 64491 INJ PARAVERT F JNT C/T 2 LEV: CPT | Performed by: PAIN MEDICINE

## 2019-02-13 PROCEDURE — 64490 INJ PARAVERT F JNT C/T 1 LEV: CPT | Performed by: PAIN MEDICINE

## 2019-02-13 NOTE — PROGRESS NOTES
LEFT C3-6 Cervical Medial Branch Blockade  [facility]    PREOPERATIVE DIAGNOSIS:  Cervical spondylosis without myelopathy   POSTOPERATIVE DIAGNOSIS:  Same as preoperative diagnosis    PROCEDURE:    Cervical Facet Nerve (medial branch) Blocks, with Fluoroscopy:  LEVELS C3, C4, C5, and C6, to block facet joints C3-4, C4-5, and C5-6 on the LEFT  • 41093 - Cervical Facet block, 1st level  • 81434 - Cervical Facet block, 2nd level  • 62215 - Cervical Facet block, 3rd level    PRE-PROCEDURE DISCUSSION WITH PATIENT:    Risks and complications were discussed with the patient prior to starting the procedure and informed consent was obtained.    SURGEON:  Robyn Voss MD    REASON FOR PROCEDURE:    The patient complains of pain that seems to have a significant axial component Increased neck pain on range of motion exams Pain on extension of the cervical spine    SEDATION:  Versed 2mg IV  ANESTHETIC:   Marcaine 0.25%  STEROID:  Dexamethasone 8mg  TOTAL VOLUME OF SOLUTION:  4 mL    DESCRIPTON OF PROCEDURE:  After obtaining informed consent, the patient was placed in the prone position. IV access was obtained.  EKG, blood pressure, and pulse oximeter were monitored and all sedation was administered by an RN under my direct guidance.  The cervical area was prepped with Chloraprep and draped with sterile barrier. Under fluoroscopic guidance the waists of the C3 through C6 vertebra on the affected side were identified. Skin and subcutaneous tissue was then anesthetized with 1% lidocaine 1mL at each point. Then spinal needles were introduced under fluoroscopic guidance at the waist of these vertebra on this side. After confirming the position of the needle under fluoroscopic PA and lateral views, and confirming negative aspiration of blood and CSF, 0.25 mL of Omnipaque was injected.  Proper spread and lack of vascular uptake was demonstrated.  A solution was prepared as above, and 1 mL of that solution was  injected very slowly each level.      Needles were removed intact from all levels.  Vitals were stable throughout.     ESTIMATED BLOOD LOSS:  minimal  SPECIMENS:  None    COMPLICATIONS:    No complications were noted. and There was no indication of vascular uptake on live injection of contrast dye.    TOLERANCE & DISCHARGE CONDITION:    The patient tolerated the procedure well.  The patient was transported to the recovery area without difficulties.  The patient was discharged to home under the care of family in stable and satisfactory condition.      PLAN OF CARE:  1. The patient was given our standard instruction sheet.  2. We discussed that Cervical Medial Branch Blockade is a diagnostic procedure in consideration for radiofrequency ablation if two diagnostic procedures proved to be positive for significant benefit.  If sustained relief of six to eight weeks is obtained, then an alternative plan could be therapeutic cervical medial branch blocks on an as-needed basis.  3. The patient is asked to keep a pain log hourly for 8 hours postoperatively today.  4. The patient will Return to clinic 4-6 wks.  5. The patient will resume all medications as per the medication reconciliation sheet.

## 2019-03-20 ENCOUNTER — OFFICE VISIT (OUTPATIENT)
Dept: PAIN MEDICINE | Facility: CLINIC | Age: 74
End: 2019-03-20

## 2019-03-20 VITALS
DIASTOLIC BLOOD PRESSURE: 69 MMHG | HEART RATE: 92 BPM | HEIGHT: 64 IN | TEMPERATURE: 98.6 F | OXYGEN SATURATION: 92 % | SYSTOLIC BLOOD PRESSURE: 128 MMHG | WEIGHT: 193 LBS | RESPIRATION RATE: 16 BRPM | BODY MASS INDEX: 32.95 KG/M2

## 2019-03-20 DIAGNOSIS — M54.2 CHRONIC NECK PAIN: ICD-10-CM

## 2019-03-20 DIAGNOSIS — G89.29 CHRONIC NECK PAIN: ICD-10-CM

## 2019-03-20 DIAGNOSIS — M54.81 OCCIPITAL NEURALGIA OF LEFT SIDE: ICD-10-CM

## 2019-03-20 DIAGNOSIS — G89.29 OTHER CHRONIC PAIN: Primary | ICD-10-CM

## 2019-03-20 DIAGNOSIS — M47.812 CERVICAL SPONDYLOSIS WITHOUT MYELOPATHY: ICD-10-CM

## 2019-03-20 PROCEDURE — 99213 OFFICE O/P EST LOW 20 MIN: CPT | Performed by: NURSE PRACTITIONER

## 2019-03-20 NOTE — PATIENT INSTRUCTIONS
--------  Education about Radiofrequency Lesioning of Medial Branches:    The medial branch blockade was intended for diagnostic purposes, with the intent of offering the patient Radiofrequency thermal rhizotomy if the MBB is diagnostically effective.  The diagnostic blockade is necessary to determine the likelihood that RF therapy could be efficacious in providing long term relief to the patient.  As indicated above, diagnostic efficacy was established.      In the RF procedure, needles are placed to the joint lines in the same fashion, and after testing, the needle tips are heated to thermally treat the nerves, blocking the nerves by in essence damaging the nerves with the heat treatment.      Medically, a successful RF procedure should provide a patient with 50% pain relief or more for at least 6 months.  We estimate a likelihood of about an 80% chance that medical success will be realized.  We discussed & educated once again that not all patients have a medically successful result, and the patient voices understanding.  However, our clinical experience suggests that successful patients receive relief more in the range of 12 months on average.  (We also discussed that a fortunate minority of patients receive therapeutic success from the MBB, and may not require RF ablation.  If a patient receives more than 8 weeks of relief from MBB, then occasional repeat MBB for therapeutic purposes is a very reasonable alternative therapy.  This course of therapy is consistent with our LCDs according to our CMS  in the area, and therefore other insurance providers should follow accordingly.  We will monitor our patients to screen for these therapeutic responders and will offer RF therapy only when necessary.  However, in this clinical scenario, this therapeutic result was not realized, and therefore Radiofrequency Lesioning is medically necessary.)      We discussed that MBB & RF are not without risks.  Guidelines  regarding anticoagulant use & neuraxial procedures will be respected.  Patients that are ill or otherwise may be at risk for sepsis will not have their spines accessed by neuraxial injections of any type.  This patient will not be offered these therapies if there is an increased risk.   We discussed that there is a risk of postprocedural pain and also a risk of worsening of clinical picture with these procedures as with any neuraxial procedure.  All invasive procedures have risks including but not limited to bleeding, infection, injury, nerve injury, paralysis, coma, death, lack of pain relief, and worsening of clinical picture.      In this education session, all of these topics were covered and the patient voiced understanding.    ---------      [employer] - RX ALTERNATIVES - local company

## 2019-03-20 NOTE — PROGRESS NOTES
CHIEF COMPLAINT  Pt is here to f/u on her Neck pain.     Subjective   Deanne Upton is a 73 y.o. female  who presents to the office for follow-up of procedure.  She completed a left C3-C6 mbb  on  2/13/19 performed by Dr. Voss for management of neck pain. Patient reports diagnostic relief immediately following the procedure.  This was the second MBB.  Completed Left C3-C6 MBB 12/3/18 -- significant benefit, reports almost 100% relief for a week and significant ongoing relief thereafter for 6 weeks.      Left ONB - 10/30/18 -- Did not help    Neck Pain    This is a chronic problem. The current episode started more than 1 year ago. The problem occurs daily. The problem has been gradually improving. The pain is present in the left side. The pain is at a severity of 7/10. The pain is mild. The symptoms are aggravated by position (tension). Associated symptoms include headaches (mild) and numbness (Left arm x1 day). Pertinent negatives include no chest pain or weakness. She has tried acetaminophen for the symptoms. The treatment provided significant (cervical MBB) relief.     PEG Assessment   What number best describes your pain on average in the past week?7  What number best describes how, during the past week, pain has interfered with your enjoyment of life?8  What number best describes how, during the past week, pain has interfered with your general activity?  8    The following portions of the patient's history were reviewed and updated as appropriate: allergies, current medications, past family history, past medical history, past social history, past surgical history and problem list.    Review of Systems   Constitutional: Negative for fatigue.   HENT: Negative for congestion.    Respiratory: Negative for shortness of breath.    Cardiovascular: Negative for chest pain.   Gastrointestinal: Negative for abdominal pain.   Genitourinary: Negative for difficulty urinating.   Musculoskeletal: Positive for neck pain  "(Currently going on).   Neurological: Positive for numbness (Left arm x1 day) and headaches (mild). Negative for weakness.   Psychiatric/Behavioral: Negative for suicidal ideas.     Vitals:    03/20/19 1349   BP: 128/69   Pulse: 92   Resp: 16   Temp: 98.6 °F (37 °C)   SpO2: 92%   Weight: 87.5 kg (193 lb)   Height: 162.6 cm (64.02\")   PainSc:   7   PainLoc: Neck     Objective   Physical Exam   Constitutional: She is oriented to person, place, and time. She appears well-developed and well-nourished. No distress.   HENT:   Head: Normocephalic and atraumatic.   Eyes: Conjunctivae and EOM are normal.   Neck: Neck supple.   Cardiovascular: Normal rate.   Pulmonary/Chest: Effort normal. No respiratory distress.   Musculoskeletal:        Cervical back: She exhibits tenderness and bony tenderness.   +left cervical facet tenderness/loading    Neurological: She is alert and oriented to person, place, and time. She has normal strength. No sensory deficit. Coordination and gait normal.   Skin: Skin is warm and dry.   Psychiatric: She has a normal mood and affect. Her behavior is normal.   Nursing note and vitals reviewed.    Assessment/Plan   Denane was seen today for neck pain.    Diagnoses and all orders for this visit:    Other chronic pain    Chronic neck pain    Occipital neuralgia of left side  -     Case Request    Cervical spondylosis without myelopathy  -     Case Request      --- Left C3-C6 RFL   --------  Education about Radiofrequency Lesioning of Medial Branches:    The medial branch blockade was intended for diagnostic purposes, with the intent of offering the patient Radiofrequency thermal rhizotomy if the MBB is diagnostically effective.  The diagnostic blockade is necessary to determine the likelihood that RF therapy could be efficacious in providing long term relief to the patient.  As indicated above, diagnostic efficacy was established.      In the RF procedure, needles are placed to the joint lines in the " same fashion, and after testing, the needle tips are heated to thermally treat the nerves, blocking the nerves by in essence damaging the nerves with the heat treatment.      Medically, a successful RF procedure should provide a patient with 50% pain relief or more for at least 6 months.  We estimate a likelihood of about an 80% chance that medical success will be realized.  We discussed & educated once again that not all patients have a medically successful result, and the patient voices understanding.  However, our clinical experience suggests that successful patients receive relief more in the range of 12 months on average.  (We also discussed that a fortunate minority of patients receive therapeutic success from the MBB, and may not require RF ablation.  If a patient receives more than 8 weeks of relief from MBB, then occasional repeat MBB for therapeutic purposes is a very reasonable alternative therapy.  This course of therapy is consistent with our LCDs according to our CMS  in the area, and therefore other insurance providers should follow accordingly.  We will monitor our patients to screen for these therapeutic responders and will offer RF therapy only when necessary.  However, in this clinical scenario, this therapeutic result was not realized, and therefore Radiofrequency Lesioning is medically necessary.)      We discussed that MBB & RF are not without risks.  Guidelines regarding anticoagulant use & neuraxial procedures will be respected.  Patients that are ill or otherwise may be at risk for sepsis will not have their spines accessed by neuraxial injections of any type.  This patient will not be offered these therapies if there is an increased risk.   We discussed that there is a risk of postprocedural pain and also a risk of worsening of clinical picture with these procedures as with any neuraxial procedure.  All invasive procedures have risks including but not limited to bleeding,  infection, injury, nerve injury, paralysis, coma, death, lack of pain relief, and worsening of clinical picture.      In this education session, all of these topics were covered and the patient voiced understanding.    ---------    --- Follow-up after procedure          YURIDIA REPORT    YURIDIA report has been reviewed and scanned into the patient's chart.    As the clinician, I personally reviewed the YURIDIA from 3/18/19 while the patient was in the office today.    EMR Dragon/Transcription disclaimer:   Much of this encounter note is an electronic transcription/translation of spoken language to printed text. The electronic translation of spoken language may permit erroneous, or at times, nonsensical words or phrases to be inadvertently transcribed; Although I have reviewed the note for such errors, some may still exist.

## 2019-03-22 ENCOUNTER — CLINICAL SUPPORT (OUTPATIENT)
Dept: ORTHOPEDIC SURGERY | Facility: CLINIC | Age: 74
End: 2019-03-22

## 2019-03-22 VITALS — HEIGHT: 63 IN | BODY MASS INDEX: 33.84 KG/M2 | WEIGHT: 191 LBS | TEMPERATURE: 97.7 F

## 2019-03-22 DIAGNOSIS — M17.0 BILATERAL PRIMARY OSTEOARTHRITIS OF KNEE: Primary | ICD-10-CM

## 2019-03-22 PROCEDURE — 20610 DRAIN/INJ JOINT/BURSA W/O US: CPT | Performed by: NURSE PRACTITIONER

## 2019-03-22 RX ORDER — METHYLPREDNISOLONE ACETATE 80 MG/ML
80 INJECTION, SUSPENSION INTRA-ARTICULAR; INTRALESIONAL; INTRAMUSCULAR; SOFT TISSUE
Status: COMPLETED | OUTPATIENT
Start: 2019-03-22 | End: 2019-03-22

## 2019-03-22 RX ADMIN — METHYLPREDNISOLONE ACETATE 80 MG: 80 INJECTION, SUSPENSION INTRA-ARTICULAR; INTRALESIONAL; INTRAMUSCULAR; SOFT TISSUE at 13:24

## 2019-03-22 RX ADMIN — METHYLPREDNISOLONE ACETATE 80 MG: 80 INJECTION, SUSPENSION INTRA-ARTICULAR; INTRALESIONAL; INTRAMUSCULAR; SOFT TISSUE at 13:25

## 2019-03-22 NOTE — PROGRESS NOTES
Large Joint Arthrocentesis: R knee  Date/Time: 3/22/2019 1:24 PM  Consent given by: patient  Site marked: site marked  Timeout: Immediately prior to procedure a time out was called to verify the correct patient, procedure, equipment, support staff and site/side marked as required   Supporting Documentation  Indications: pain   Procedure Details  Location: knee - R knee  Needle size: 25 G  Approach: anteromedial  Medications administered: 80 mg methylPREDNISolone acetate 80 MG/ML; 2 mL lidocaine (cardiac) 20 MG/ML      Large Joint Arthrocentesis: L knee  Date/Time: 3/22/2019 1:25 PM  Consent given by: patient  Site marked: site marked  Timeout: Immediately prior to procedure a time out was called to verify the correct patient, procedure, equipment, support staff and site/side marked as required   Supporting Documentation  Indications: pain   Procedure Details  Location: knee - L knee  Needle size: 25 G  Approach: anteromedial  Medications administered: 2 mL lidocaine (cardiac) 20 MG/ML; 80 mg methylPREDNISolone acetate 80 MG/ML  Patient tolerance: patient tolerated the procedure well with no immediate complications      Prior to injections risks were discussed including pain, infection, elevated blood sugar.  Patient verbalized understanding would like to proceed with injection    Tianna OWENS

## 2019-03-25 ENCOUNTER — OFFICE VISIT (OUTPATIENT)
Dept: FAMILY MEDICINE CLINIC | Facility: CLINIC | Age: 74
End: 2019-03-25

## 2019-03-25 VITALS
DIASTOLIC BLOOD PRESSURE: 78 MMHG | SYSTOLIC BLOOD PRESSURE: 128 MMHG | OXYGEN SATURATION: 96 % | BODY MASS INDEX: 33.82 KG/M2 | RESPIRATION RATE: 21 BRPM | TEMPERATURE: 99.1 F | HEIGHT: 63 IN | HEART RATE: 93 BPM | WEIGHT: 190.9 LBS

## 2019-03-25 DIAGNOSIS — J30.2 SEASONAL ALLERGIC RHINITIS, UNSPECIFIED TRIGGER: ICD-10-CM

## 2019-03-25 DIAGNOSIS — J45.21 MILD INTERMITTENT ASTHMA WITH ACUTE EXACERBATION: Primary | ICD-10-CM

## 2019-03-25 PROCEDURE — 99214 OFFICE O/P EST MOD 30 MIN: CPT | Performed by: FAMILY MEDICINE

## 2019-03-25 RX ORDER — CHOLECALCIFEROL (VITAMIN D3) 125 MCG
10 CAPSULE ORAL NIGHTLY
COMMUNITY

## 2019-03-25 RX ORDER — AZITHROMYCIN 250 MG/1
TABLET, FILM COATED ORAL
Qty: 6 TABLET | Refills: 0 | Status: SHIPPED | OUTPATIENT
Start: 2019-03-25 | End: 2019-04-08

## 2019-03-25 NOTE — PROGRESS NOTES
"Chief Complaint   Patient presents with   • Cough     productive yell-green in color SOA with excertion    • Nasal Congestion     started Thurs        Subjective   Deanne Upton is a 73 y.o. female.     History of Present Illness   Started with symptoms about 5 days ago prior to this about 8 days ago she had nausea and had to go home from Sunday school because of her symptoms. She did not have any vomiting. She thinks she has had fever but not terrible. She continues to have nausea. No vomiting or diarrhea, maybe a little loose. She is taking benadryl and helping some, taking 12.5 mg every 5 hours seems to helping. It makes her sleepy. Taking her symbicort regularly, feels relief after that. She is also using her albuterol which is helping. She is on zyrtec and montelukast. She is having to miss her obligations related to not feeling well. Coughing. She is getting SOB and some wheezing when she lies down at night.     The following portions of the patient's history were reviewed and updated as appropriate: allergies, current medications, past medical history, past social history and problem list.    Review of Systems   HENT: Positive for congestion, rhinorrhea and sore throat.    Respiratory: Positive for cough, chest tightness and shortness of breath.    Cardiovascular: Negative.    Neurological: Positive for headaches.       Vitals:    03/25/19 1246   BP: 128/78   BP Location: Right arm   Patient Position: Sitting   Cuff Size: Adult   Pulse: 93   Resp: 21   Temp: 99.1 °F (37.3 °C)   TempSrc: Oral   SpO2: 96%   Weight: 86.6 kg (190 lb 14.4 oz)   Height: 160 cm (63\")       Objective   Physical Exam   Constitutional: She is oriented to person, place, and time. She appears well-nourished. No distress.   Eyes: Conjunctivae are normal. Right eye exhibits no discharge. Left eye exhibits no discharge. No scleral icterus.   Cardiovascular: Normal rate, regular rhythm, normal heart sounds and intact distal pulses. Exam " reveals no gallop and no friction rub.   No murmur heard.  Pulmonary/Chest: Effort normal and breath sounds normal. No respiratory distress.   Scant wheeze in mid lung fields.  No crackles or coarse sounds.  Good air movement throughout.  She does have intermittent cough with deep inspiration intermittently and with talking.  She can also complete multiple sentences without cough or shortness of breath.   Musculoskeletal: She exhibits no edema.   Neurological: She is alert and oriented to person, place, and time.   Psychiatric: She has a normal mood and affect. Her behavior is normal.   Vitals reviewed.      Assessment/Plan   Deanne was seen today for cough and nasal congestion.    Diagnoses and all orders for this visit:    Mild intermittent asthma with acute exacerbation    Seasonal allergic rhinitis, unspecified trigger    Other orders  -     azithromycin (ZITHROMAX) 250 MG tablet; Take 2 tablets the first day, then 1 tablet daily for 4 days.    Instructed that while she is on the antibiotic she should take 4 puffs of Symbicort twice daily and then go back down to 2 puffs twice daily.  Counseled to get back on her intranasal steroid spray related to allergy symptoms increased for spring.

## 2019-03-26 RX ORDER — MONTELUKAST SODIUM 10 MG/1
10 TABLET ORAL NIGHTLY
Qty: 90 TABLET | Refills: 2 | Status: SHIPPED | OUTPATIENT
Start: 2019-03-26 | End: 2019-04-08 | Stop reason: SDUPTHER

## 2019-03-26 RX ORDER — LISINOPRIL AND HYDROCHLOROTHIAZIDE 12.5; 1 MG/1; MG/1
1 TABLET ORAL DAILY
Qty: 90 TABLET | Refills: 2 | Status: SHIPPED | OUTPATIENT
Start: 2019-03-26 | End: 2020-02-24

## 2019-04-08 ENCOUNTER — OFFICE VISIT (OUTPATIENT)
Dept: FAMILY MEDICINE CLINIC | Facility: CLINIC | Age: 74
End: 2019-04-08

## 2019-04-08 VITALS
HEART RATE: 92 BPM | OXYGEN SATURATION: 98 % | DIASTOLIC BLOOD PRESSURE: 64 MMHG | SYSTOLIC BLOOD PRESSURE: 120 MMHG | TEMPERATURE: 97.7 F | HEIGHT: 63 IN | RESPIRATION RATE: 21 BRPM | BODY MASS INDEX: 34.45 KG/M2 | WEIGHT: 194.4 LBS

## 2019-04-08 DIAGNOSIS — J45.21 MILD INTERMITTENT ASTHMA WITH ACUTE EXACERBATION: Primary | ICD-10-CM

## 2019-04-08 DIAGNOSIS — Z11.59 SCREENING EXAMINATION FOR MEASLES: ICD-10-CM

## 2019-04-08 PROCEDURE — 99214 OFFICE O/P EST MOD 30 MIN: CPT | Performed by: FAMILY MEDICINE

## 2019-04-08 RX ORDER — BENZONATATE 200 MG/1
200 CAPSULE ORAL 3 TIMES DAILY PRN
Qty: 30 CAPSULE | Refills: 0 | Status: SHIPPED | OUTPATIENT
Start: 2019-04-08 | End: 2019-12-11

## 2019-04-08 RX ORDER — MOMETASONE FUROATE 50 UG/1
2 SPRAY, METERED NASAL DAILY
COMMUNITY

## 2019-04-08 RX ORDER — PREDNISONE 20 MG/1
40 TABLET ORAL DAILY
Qty: 6 TABLET | Refills: 0 | Status: SHIPPED | OUTPATIENT
Start: 2019-04-08 | End: 2019-08-06

## 2019-04-08 NOTE — PROGRESS NOTES
"Chief Complaint   Patient presents with   • Cough     productive white to yellow at times; cough keeps her up at night   • Shortness of Breath     with and without excersion       Subjective   Deanne Upton is a 73 y.o. female.     History of Present Illness   SOB  She took abx and got better. Now a little worse again over 4-5 days. She doing her peak flow and getting better than previous. She is at 425 which is well above her normal. She took the z mechelle but felt it was hard on her stomach when she was coughing she would cough up \"bile\" she said it was brownish and would happen at night when lying down, had the burning like reflux.   She did complete the abx. The benzonatate was helpful and she is out.   When she lies on her right side she has more trouble with breathing and coughing. Thinks she may have had fever but she also takes tylenol scheduled every 8 hours and if late feels like she is warm.   She also has bad allergies and started taking her nasal spray again.     Pt has big concern regarding the measles resurgence.     The following portions of the patient's history were reviewed and updated as appropriate: allergies, current medications, past medical history, past social history and problem list.    Review of Systems   HENT: Positive for sinus pressure, sinus pain and sore throat.    Respiratory: Positive for cough, chest tightness, shortness of breath and wheezing.    Cardiovascular: Negative.    Neurological: Positive for dizziness.       Vitals:    04/08/19 1206   BP: 120/64   BP Location: Left arm   Patient Position: Sitting   Cuff Size: Adult   Pulse: 92   Resp: 21   Temp: 97.7 °F (36.5 °C)   TempSrc: Oral   SpO2: 98%   Weight: 88.2 kg (194 lb 6.4 oz)   Height: 160 cm (63\")       Objective   Physical Exam   Constitutional: She is oriented to person, place, and time. She appears well-nourished. No distress.   Eyes: Conjunctivae are normal. Right eye exhibits no discharge. Left eye exhibits no " discharge. No scleral icterus.   Cardiovascular: Normal rate, regular rhythm, normal heart sounds and intact distal pulses. Exam reveals no gallop and no friction rub.   No murmur heard.  Pulmonary/Chest: Effort normal and breath sounds normal. No respiratory distress. She has no wheezes.   Musculoskeletal: She exhibits no edema.   Neurological: She is alert and oriented to person, place, and time.   Psychiatric: She has a normal mood and affect. Her behavior is normal.   Vitals reviewed.      Assessment/Plan   Deanne was seen today for cough and shortness of breath.    Diagnoses and all orders for this visit:    Mild intermittent asthma with acute exacerbation    Screening examination for measles  -     Rubeola Antibody IgG    Other orders  -     predniSONE (DELTASONE) 20 MG tablet; Take 2 tablets by mouth Daily.  -     benzonatate (TESSALON) 200 MG capsule; Take 1 capsule by mouth 3 (Three) Times a Day As Needed for Cough.      Worsened symptoms likely related to allergies at this time. She was having watery eyes when out. Her lungs sound clear, she does have some coughing with deep inspiration but infrequent on testing. She is comfortable at rest, talked to her after she checked out and walked to the waiting room and she was not SOB.  We will do 3 days of prednisone, she has well controlled DM but does not test her sugars so we will keep steroids brief.

## 2019-04-09 LAB — MEV IGG SER IA-ACNC: >300 AU/ML

## 2019-04-11 ENCOUNTER — TELEPHONE (OUTPATIENT)
Dept: PAIN MEDICINE | Facility: CLINIC | Age: 74
End: 2019-04-11

## 2019-04-11 NOTE — TELEPHONE ENCOUNTER
Pt called in needing to get with you letting you know that she is starting to have great movement in her neck and wanted to know if you think that another procedure would be still needed .  She is due in on 4/18    Phone number listed is   [phone]

## 2019-04-11 NOTE — TELEPHONE ENCOUNTER
There is nothing wrong with holding off, taking a wait-and-see approach.  She could come in for an office visit instead if she would like to be examined.

## 2019-04-12 NOTE — TELEPHONE ENCOUNTER
I called Ms. Upton and left a message on her voicemail relaying the message. I told her to call the office and cancel her next procedure if she wants to.

## 2019-05-22 ENCOUNTER — OFFICE VISIT (OUTPATIENT)
Dept: FAMILY MEDICINE CLINIC | Facility: CLINIC | Age: 74
End: 2019-05-22

## 2019-05-22 ENCOUNTER — HOSPITAL ENCOUNTER (OUTPATIENT)
Dept: GENERAL RADIOLOGY | Facility: HOSPITAL | Age: 74
Discharge: HOME | End: 2019-05-22
Admitting: NURSE PRACTITIONER

## 2019-05-22 VITALS
DIASTOLIC BLOOD PRESSURE: 76 MMHG | BODY MASS INDEX: 34.14 KG/M2 | OXYGEN SATURATION: 97 % | HEART RATE: 86 BPM | TEMPERATURE: 97.9 F | WEIGHT: 192.7 LBS | SYSTOLIC BLOOD PRESSURE: 127 MMHG

## 2019-05-22 DIAGNOSIS — M25.512 ACUTE PAIN OF LEFT SHOULDER: Primary | ICD-10-CM

## 2019-05-22 PROCEDURE — 96372 THER/PROPH/DIAG INJ SC/IM: CPT | Performed by: NURSE PRACTITIONER

## 2019-05-22 PROCEDURE — 99213 OFFICE O/P EST LOW 20 MIN: CPT | Performed by: NURSE PRACTITIONER

## 2019-05-22 PROCEDURE — 73030 X-RAY EXAM OF SHOULDER: CPT

## 2019-05-22 RX ORDER — METHYLPREDNISOLONE ACETATE 40 MG/ML
40 INJECTION, SUSPENSION INTRA-ARTICULAR; INTRALESIONAL; INTRAMUSCULAR; SOFT TISSUE ONCE
Status: COMPLETED | OUTPATIENT
Start: 2019-05-22 | End: 2019-05-22

## 2019-05-22 RX ORDER — BACLOFEN 10 MG/1
10 TABLET ORAL 3 TIMES DAILY
Qty: 30 TABLET | Refills: 0 | Status: SHIPPED | OUTPATIENT
Start: 2019-05-22 | End: 2019-08-06

## 2019-05-22 RX ADMIN — METHYLPREDNISOLONE ACETATE 40 MG: 40 INJECTION, SUSPENSION INTRA-ARTICULAR; INTRALESIONAL; INTRAMUSCULAR; SOFT TISSUE at 14:48

## 2019-05-22 NOTE — PATIENT INSTRUCTIONS
Baclofen at bedtime as needed, may cause drowsiness, do not operate heavy machinery or drive until you see how this medication affects you.    You were given a steroid injection today. This may temporarily raise your glucose. Monitor glucose levels over the next few weeks.    Apply biofreeze or icy hot as needed for muscle pain    Rest arm  Consider PT for no improvement or orthopedic follow up
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
## 2019-06-28 ENCOUNTER — CLINICAL SUPPORT (OUTPATIENT)
Dept: ORTHOPEDIC SURGERY | Facility: CLINIC | Age: 74
End: 2019-06-28

## 2019-06-28 VITALS — HEIGHT: 62 IN | BODY MASS INDEX: 35.07 KG/M2 | TEMPERATURE: 97.9 F | WEIGHT: 190.6 LBS

## 2019-06-28 DIAGNOSIS — M17.0 BILATERAL PRIMARY OSTEOARTHRITIS OF KNEE: Primary | ICD-10-CM

## 2019-06-28 PROCEDURE — 20610 DRAIN/INJ JOINT/BURSA W/O US: CPT | Performed by: NURSE PRACTITIONER

## 2019-06-28 RX ORDER — METHYLPREDNISOLONE ACETATE 80 MG/ML
80 INJECTION, SUSPENSION INTRA-ARTICULAR; INTRALESIONAL; INTRAMUSCULAR; SOFT TISSUE
Status: COMPLETED | OUTPATIENT
Start: 2019-06-28 | End: 2019-06-28

## 2019-06-28 RX ADMIN — METHYLPREDNISOLONE ACETATE 80 MG: 80 INJECTION, SUSPENSION INTRA-ARTICULAR; INTRALESIONAL; INTRAMUSCULAR; SOFT TISSUE at 13:33

## 2019-06-28 RX ADMIN — METHYLPREDNISOLONE ACETATE 80 MG: 80 INJECTION, SUSPENSION INTRA-ARTICULAR; INTRALESIONAL; INTRAMUSCULAR; SOFT TISSUE at 13:34

## 2019-06-28 NOTE — PROGRESS NOTES
6/28/2019    Deanne Upton is here today for worsening knee pain. Pt has undergone injection of the knee in the past with good resolution of symptoms. Pt is requesting a repeat injection.     KNEE Injection Procedure Note:    Large Joint Arthrocentesis: R knee  Date/Time: 6/28/2019 1:33 PM  Consent given by: patient  Site marked: site marked  Timeout: Immediately prior to procedure a time out was called to verify the correct patient, procedure, equipment, support staff and site/side marked as required   Supporting Documentation  Indications: pain   Procedure Details  Location: knee - R knee  Preparation: Patient was prepped and draped in the usual sterile fashion  Needle size: 22 G  Approach: anterolateral  Medications administered: 2 mL lidocaine (cardiac); 80 mg methylPREDNISolone acetate 80 MG/ML  Patient tolerance: patient tolerated the procedure well with no immediate complications    Large Joint Arthrocentesis: L knee  Date/Time: 6/28/2019 1:34 PM  Consent given by: patient  Site marked: site marked  Timeout: Immediately prior to procedure a time out was called to verify the correct patient, procedure, equipment, support staff and site/side marked as required   Supporting Documentation  Indications: pain   Procedure Details  Location: knee - L knee  Preparation: Patient was prepped and draped in the usual sterile fashion  Needle size: 22 G  Approach: anterolateral  Medications administered: 2 mL lidocaine (cardiac); 80 mg methylPREDNISolone acetate 80 MG/ML  Patient tolerance: patient tolerated the procedure well with no immediate complications      Prior to injections risks were discussed including pain, infection, elevated blood sugar.  Patient verbalized understanding would like to proceed with injections    At the conclusion of the injection I discussed the importance of continued quad strengthening exercises on a daily basis. I will see the patient back if the symptoms should fail to improve or  worsen.    Tianna Hale APRN  6/28/2019

## 2019-07-30 ENCOUNTER — LAB (OUTPATIENT)
Dept: FAMILY MEDICINE CLINIC | Facility: CLINIC | Age: 74
End: 2019-07-30

## 2019-07-30 DIAGNOSIS — I10 ESSENTIAL HYPERTENSION: Primary | ICD-10-CM

## 2019-07-30 DIAGNOSIS — I10 ESSENTIAL HYPERTENSION: ICD-10-CM

## 2019-07-30 DIAGNOSIS — E78.1 PURE HYPERGLYCERIDEMIA: ICD-10-CM

## 2019-07-30 DIAGNOSIS — E11.8 TYPE 2 DIABETES MELLITUS WITH COMPLICATION, WITHOUT LONG-TERM CURRENT USE OF INSULIN (HCC): ICD-10-CM

## 2019-07-30 LAB
ALBUMIN SERPL-MCNC: 4.3 G/DL (ref 3.5–5.2)
ALBUMIN/GLOB SERPL: 1.5 G/DL
ALP SERPL-CCNC: 81 U/L (ref 39–117)
ALT SERPL-CCNC: 15 U/L (ref 1–33)
AST SERPL-CCNC: 16 U/L (ref 1–32)
BASOPHILS # BLD AUTO: 0.06 10*3/MM3 (ref 0–0.2)
BASOPHILS NFR BLD AUTO: 1 % (ref 0–1.5)
BILIRUB SERPL-MCNC: 0.2 MG/DL (ref 0.2–1.2)
BUN SERPL-MCNC: 19 MG/DL (ref 8–23)
BUN/CREAT SERPL: 17.8 (ref 7–25)
CALCIUM SERPL-MCNC: 9.5 MG/DL (ref 8.6–10.5)
CHLORIDE SERPL-SCNC: 101 MMOL/L (ref 98–107)
CHOLEST SERPL-MCNC: 134 MG/DL (ref 0–200)
CO2 SERPL-SCNC: 27.4 MMOL/L (ref 22–29)
CREAT SERPL-MCNC: 1.07 MG/DL (ref 0.57–1)
EOSINOPHIL # BLD AUTO: 0.27 10*3/MM3 (ref 0–0.4)
EOSINOPHIL NFR BLD AUTO: 4.5 % (ref 0.3–6.2)
ERYTHROCYTE [DISTWIDTH] IN BLOOD BY AUTOMATED COUNT: 13.2 % (ref 12.3–15.4)
GLOBULIN SER CALC-MCNC: 2.8 GM/DL
GLUCOSE SERPL-MCNC: 123 MG/DL (ref 65–99)
HBA1C MFR BLD: 6.2 % (ref 4.8–5.6)
HCT VFR BLD AUTO: 43.9 % (ref 34–46.6)
HDLC SERPL-MCNC: 44 MG/DL (ref 40–60)
HGB BLD-MCNC: 13.7 G/DL (ref 12–15.9)
IMM GRANULOCYTES # BLD AUTO: 0.02 10*3/MM3 (ref 0–0.05)
IMM GRANULOCYTES NFR BLD AUTO: 0.3 % (ref 0–0.5)
LDLC SERPL CALC-MCNC: 46 MG/DL (ref 0–100)
LYMPHOCYTES # BLD AUTO: 2.22 10*3/MM3 (ref 0.7–3.1)
LYMPHOCYTES NFR BLD AUTO: 36.8 % (ref 19.6–45.3)
MCH RBC QN AUTO: 28.7 PG (ref 26.6–33)
MCHC RBC AUTO-ENTMCNC: 31.2 G/DL (ref 31.5–35.7)
MCV RBC AUTO: 91.8 FL (ref 79–97)
MONOCYTES # BLD AUTO: 0.58 10*3/MM3 (ref 0.1–0.9)
MONOCYTES NFR BLD AUTO: 9.6 % (ref 5–12)
NEUTROPHILS # BLD AUTO: 2.88 10*3/MM3 (ref 1.7–7)
NEUTROPHILS NFR BLD AUTO: 47.8 % (ref 42.7–76)
NRBC BLD AUTO-RTO: 0 /100 WBC (ref 0–0.2)
PLATELET # BLD AUTO: 285 10*3/MM3 (ref 140–450)
POTASSIUM SERPL-SCNC: 4.4 MMOL/L (ref 3.5–5.2)
PROT SERPL-MCNC: 7.1 G/DL (ref 6–8.5)
RBC # BLD AUTO: 4.78 10*6/MM3 (ref 3.77–5.28)
SODIUM SERPL-SCNC: 140 MMOL/L (ref 136–145)
TRIGL SERPL-MCNC: 222 MG/DL (ref 0–150)
VLDLC SERPL CALC-MCNC: 44.4 MG/DL
WBC # BLD AUTO: 6.03 10*3/MM3 (ref 3.4–10.8)

## 2019-08-06 ENCOUNTER — OFFICE VISIT (OUTPATIENT)
Dept: FAMILY MEDICINE CLINIC | Facility: CLINIC | Age: 74
End: 2019-08-06

## 2019-08-06 VITALS
OXYGEN SATURATION: 98 % | SYSTOLIC BLOOD PRESSURE: 142 MMHG | WEIGHT: 192.5 LBS | HEIGHT: 62 IN | TEMPERATURE: 99 F | HEART RATE: 94 BPM | RESPIRATION RATE: 16 BRPM | DIASTOLIC BLOOD PRESSURE: 80 MMHG | BODY MASS INDEX: 35.43 KG/M2

## 2019-08-06 DIAGNOSIS — Z78.0 POSTMENOPAUSAL: ICD-10-CM

## 2019-08-06 DIAGNOSIS — M25.512 CHRONIC LEFT SHOULDER PAIN: ICD-10-CM

## 2019-08-06 DIAGNOSIS — Z00.00 MEDICARE ANNUAL WELLNESS VISIT, SUBSEQUENT: Primary | ICD-10-CM

## 2019-08-06 DIAGNOSIS — G89.29 CHRONIC LEFT SHOULDER PAIN: ICD-10-CM

## 2019-08-06 PROCEDURE — 90715 TDAP VACCINE 7 YRS/> IM: CPT | Performed by: FAMILY MEDICINE

## 2019-08-06 PROCEDURE — 90471 IMMUNIZATION ADMIN: CPT | Performed by: FAMILY MEDICINE

## 2019-08-06 PROCEDURE — G0438 PPPS, INITIAL VISIT: HCPCS | Performed by: FAMILY MEDICINE

## 2019-08-06 NOTE — PROGRESS NOTES
The ABCs of the Annual Wellness Visit  Subsequent Medicare Wellness Visit    Chief Complaint   Patient presents with   • Medicare Wellness-subsequent       Subjective   History of Present Illness:  Deanne Upton is a 74 y.o. female who presents for a Subsequent Medicare Wellness Visit.    HEALTH RISK ASSESSMENT    Recent Hospitalizations:  No hospitalization(s) within the last year.    Current Medical Providers:  Patient Care Team:  Sangeetha Urbina MD as PCP - General (Family Medicine)  Jeffrey Kent MD as PCP - Claims Attributed    Smoking Status:  Social History     Tobacco Use   Smoking Status Former Smoker   Smokeless Tobacco Never Used   Tobacco Comment    she smoked 1 pack in her life       Alcohol Consumption:  Social History     Substance and Sexual Activity   Alcohol Use Yes    Comment: social drinker       Depression Screen:   PHQ-2/PHQ-9 Depression Screening 8/6/2019   Little interest or pleasure in doing things 0   Feeling down, depressed, or hopeless 0   Trouble falling or staying asleep, or sleeping too much -   Feeling tired or having little energy -   Poor appetite or overeating -   Feeling bad about yourself - or that you are a failure or have let yourself or your family down -   Trouble concentrating on things, such as reading the newspaper or watching television -   Moving or speaking so slowly that other people could have noticed. Or the opposite - being so fidgety or restless that you have been moving around a lot more than usual -   Thoughts that you would be better off dead, or of hurting yourself in some way -   Total Score 0   If you checked off any problems, how difficult have these problems made it for you to do your work, take care of things at home, or get along with other people? -       Fall Risk Screen:  STEADI Fall Risk Assessment was completed, and patient is at MODERATE risk for falls. Assessment completed on:8/6/2019    Health Habits and Functional and Cognitive  Screening:  Functional & Cognitive Status 8/6/2019   Do you have difficulty preparing food and eating? No   Do you have difficulty bathing yourself, getting dressed or grooming yourself? No   Do you have difficulty using the toilet? No   Do you have difficulty moving around from place to place? No   Do you have trouble with steps or getting out of a bed or a chair? Yes   Current Diet Well Balanced Diet   Dental Exam Not up to date   Eye Exam Up to date   Exercise (times per week) 0 times per week   Current Exercise Activities Include None   Do you need help using the phone?  No   Are you deaf or do you have serious difficulty hearing?  Yes   Do you need help with transportation? No   Do you need help shopping? No   Do you need help preparing meals?  No   Do you need help with housework?  Yes   Do you need help with laundry? No   Do you need help taking your medications? No   Do you need help managing money? No   Do you ever drive or ride in a car without wearing a seat belt? No   Have you felt unusual stress, anger or loneliness in the last month? Yes   Who do you live with? Alone   If you need help, do you have trouble finding someone available to you? Yes   Have you been bothered in the last four weeks by sexual problems? No   Do you have difficulty concentrating, remembering or making decisions? Yes         Does the patient have evidence of cognitive impairment? No    Asprin use counseling:Does not need ASA (and currently is not on it)    Age-appropriate Screening Schedule:  Refer to the list below for future screening recommendations based on patient's age, sex and/or medical conditions. Orders for these recommended tests are listed in the plan section. The patient has been provided with a written plan.    Health Maintenance   Topic Date Due   • ZOSTER VACCINE (2 of 3) 09/26/2012   • DIABETIC EYE EXAM  04/11/2018   • TDAP/TD VACCINES (3 - Td) 06/01/2018   • URINE MICROALBUMIN  08/30/2018   • MAMMOGRAM   04/20/2019   • INFLUENZA VACCINE  08/01/2019   • HEMOGLOBIN A1C  01/30/2020   • DIABETIC FOOT EXAM  02/08/2020   • LIPID PANEL  07/30/2020   • COLONOSCOPY  01/01/2021   • PNEUMOCOCCAL VACCINES (65+ LOW/MEDIUM RISK)  Completed          The following portions of the patient's history were reviewed and updated as appropriate: allergies, current medications, past family history, past medical history, past social history, past surgical history and problem list.    Outpatient Medications Prior to Visit   Medication Sig Dispense Refill   • acetaminophen (TYLENOL) 650 MG 8 hr tablet Take 650 mg by mouth 5 (Five) Times a Day.     • albuterol (PROVENTIL HFA;VENTOLIN HFA) 108 (90 Base) MCG/ACT inhaler Inhale 2 puffs Every 4 (Four) Hours As Needed for Wheezing. 1 inhaler 11   • B Complex-C-Folic Acid (B COMPLEX + C TR) tablet controlled-release Take by mouth.     • baclofen (LIORESAL) 10 MG tablet Take 1 tablet by mouth 3 (Three) Times a Day. 30 tablet 0   • budesonide-formoterol (SYMBICORT) 160-4.5 MCG/ACT inhaler Inhale 2 puffs 2 (Two) Times a Day. (Patient taking differently: Inhale 3 puffs 2 (Two) Times a Day.) 10.2 g 12   • Cetirizine HCl 10 MG capsule Take 10 mg by mouth 2 (Two) Times a Day.     • Cholecalciferol (VITAMIN D) 2000 UNITS capsule Take by mouth.     • diphenhydrAMINE-acetaminophen (TYLENOL PM)  MG tablet per tablet Take 1 tablet by mouth At Night As Needed for Sleep.     • lisinopril-hydrochlorothiazide (PRINZIDE,ZESTORETIC) 10-12.5 MG per tablet TAKE 1 TABLET BY MOUTH  DAILY 90 tablet 2   • melatonin 5 MG tablet tablet Take 5 mg by mouth Every Night.     • metFORMIN (GLUCOPHAGE) 500 MG tablet Take 2 tablets by mouth 2 (Two) Times a Day. 360 tablet 2   • mometasone (NASONEX) 50 MCG/ACT nasal spray 2 sprays into the nostril(s) as directed by provider Daily.     • montelukast (SINGULAIR) 10 MG tablet Take 10 mg by mouth Every Night.     • Multiple Vitamin (MULTI VITAMIN DAILY PO) Take by mouth.     •  Omega-3 Fatty Acids (FISH OIL) 600 MG capsule Take 350 mg by mouth.     • Peak Flow Meter device Use to assess breathing 1 each 0   • simvastatin (ZOCOR) 10 MG tablet TAKE 1 TABLET BY MOUTH  EVERY NIGHT 90 tablet 1   • Spacer/Aero Chamber Mouthpiece misc Use with inhalers for every breathing treatment 1 each 1   • benzonatate (TESSALON) 200 MG capsule Take 1 capsule by mouth 3 (Three) Times a Day As Needed for Cough. 30 capsule 0   • predniSONE (DELTASONE) 20 MG tablet Take 2 tablets by mouth Daily. 6 tablet 0     No facility-administered medications prior to visit.        Patient Active Problem List   Diagnosis   • Atopic rhinitis   • Anxiety   • Asthma   • Bunion   • Depression   • Functional murmur   • Headache   • Bilateral hearing loss   • Hypertension   • Osteoarthritis of knee   • Pure hyperglyceridemia   • Skin neoplasm   • Type 2 diabetes mellitus (CMS/HCC)   • Vitamin D deficiency   • Vaginal prolapse   • Morbid obesity (CMS/HCC)   • Degenerative disc disease, cervical   • Limited joint range of motion   • Chronic neck pain   • Cervical spondylosis without myelopathy   • Occipital neuralgia of left side       Advanced Care Planning:  Patient has an advance directive - a copy has not been provided. Have asked the patient to send this to us to add to record    Review of Systems   Constitutional: Negative for activity change, appetite change and unexpected weight change.   Respiratory: Negative for shortness of breath.    Cardiovascular: Negative for chest pain and leg swelling.   Gastrointestinal: Negative for blood in stool, constipation and diarrhea.       Compared to one year ago, the patient feels her physical health is the same.  Compared to one year ago, the patient feels her mental health is the same.  She does have worsening of her OA but in other ways she feels her physical health like her neck pain has improved she has better ROM of her neck. Doing the neck PT 1-2 times daily.   Says she is very  "good at remembering numbers but not as good at names and faces but not new, getting a little worse.     Reviewed chart for potential of high risk medication in the elderly: yes  Reviewed chart for potential of harmful drug interactions in the elderly:yes    Objective         Vitals:    08/06/19 1046   BP: 142/80   BP Location: Left arm   Patient Position: Sitting   Cuff Size: Adult   Pulse: 94   Resp: 16   Temp: 99 °F (37.2 °C)   TempSrc: Oral   SpO2: 98%   Weight: 87.3 kg (192 lb 8 oz)   Height: 157.5 cm (62\")       Body mass index is 35.21 kg/m².  Discussed the patient's BMI with her. The BMI is above average; no BMI management plan is appropriate..    Physical Exam   Constitutional: She is oriented to person, place, and time. She appears well-nourished. No distress.   HENT:   Right Ear: External ear normal.   Left Ear: External ear normal.   Nose: Nose normal.   Mouth/Throat: Oropharynx is clear and moist. No oropharyngeal exudate.   Bilateral ear canals and tympanic membranes appear normal. Poor dentition.   Eyes: Conjunctivae and EOM are normal. Right eye exhibits no discharge. Left eye exhibits no discharge. No scleral icterus.   Neck: Neck supple. No thyromegaly present.   Cardiovascular: Normal rate, regular rhythm, normal heart sounds and intact distal pulses. Exam reveals no gallop and no friction rub.   No murmur heard.  No carotid bruit bilaterally   Pulmonary/Chest: Effort normal and breath sounds normal. No respiratory distress. She has no wheezes. She has no rales. She exhibits no tenderness.   Abdominal: Soft. Bowel sounds are normal. She exhibits no distension and no mass. There is no tenderness. There is no guarding.   Musculoskeletal: She exhibits no edema or deformity.   Lymphadenopathy:     She has no cervical adenopathy.   Neurological: She is alert and oriented to person, place, and time. She exhibits normal muscle tone. Coordination normal.   Skin: Skin is warm and dry.   Psychiatric: She has " a normal mood and affect. Her behavior is normal.   Vitals reviewed.      Lab Results   Component Value Date     (H) 07/30/2019    CHLPL 134 07/30/2019    TRIG 222 (H) 07/30/2019    HDL 44 07/30/2019    LDL 46 07/30/2019    VLDL 44.4 07/30/2019    HGBA1C 6.20 (H) 07/30/2019        Assessment/Plan   Medicare Risks and Personalized Health Plan  CMS Preventative Services Quick Reference  Breast Cancer/Mammogram Screening  Colon Cancer Screening  Dementia/Memory   Immunizations Discussed/Encouraged (specific immunizations; adacel Tdap and Shingrix )  Obesity/Overweight   Osteoprorosis Risk    The above risks/problems have been discussed with the patient.  Pertinent information has been shared with the patient in the After Visit Summary.  Follow up plans and orders are seen below in the Assessment/Plan Section.    There are no diagnoses linked to this encounter.  Follow Up:  No Follow-up on file.     An After Visit Summary and PPPS were given to the patient.     Mammogram in 2/2019 [facility].   Due for bone density screening imaging  Vaccines she is due for TDaP and shingrix. She should inquire about the shingrix vaccine at the pharmacy.  Colonoscopy 2011 she thinks was at [facility]

## 2019-08-23 ENCOUNTER — HOSPITAL ENCOUNTER (OUTPATIENT)
Dept: BONE DENSITY | Facility: HOSPITAL | Age: 74
Discharge: HOME | End: 2019-08-23
Admitting: FAMILY MEDICINE

## 2019-08-23 DIAGNOSIS — Z78.0 POSTMENOPAUSAL: ICD-10-CM

## 2019-08-23 PROCEDURE — 77080 DXA BONE DENSITY AXIAL: CPT

## 2019-10-04 ENCOUNTER — CLINICAL SUPPORT (OUTPATIENT)
Dept: ORTHOPEDIC SURGERY | Facility: CLINIC | Age: 74
End: 2019-10-04

## 2019-10-04 VITALS — WEIGHT: 186 LBS | BODY MASS INDEX: 34.23 KG/M2 | HEIGHT: 62 IN | TEMPERATURE: 98.1 F

## 2019-10-04 DIAGNOSIS — M17.0 PRIMARY OSTEOARTHRITIS OF BOTH KNEES: Primary | ICD-10-CM

## 2019-10-04 PROCEDURE — 20610 DRAIN/INJ JOINT/BURSA W/O US: CPT | Performed by: NURSE PRACTITIONER

## 2019-10-04 RX ORDER — METHYLPREDNISOLONE ACETATE 80 MG/ML
80 INJECTION, SUSPENSION INTRA-ARTICULAR; INTRALESIONAL; INTRAMUSCULAR; SOFT TISSUE
Status: COMPLETED | OUTPATIENT
Start: 2019-10-04 | End: 2019-10-04

## 2019-10-04 RX ADMIN — METHYLPREDNISOLONE ACETATE 80 MG: 80 INJECTION, SUSPENSION INTRA-ARTICULAR; INTRALESIONAL; INTRAMUSCULAR; SOFT TISSUE at 09:06

## 2019-10-04 NOTE — PROGRESS NOTES
10/4/2019    Deanne Upton is here today for worsening knee pain. Pt has undergone injection of the knee in the past with good resolution of symptoms. Pt is requesting a repeat injection.     KNEE Injection Procedure Note:    Large Joint Arthrocentesis: R knee  Date/Time: 10/4/2019 9:06 AM  Consent given by: patient  Site marked: site marked  Timeout: Immediately prior to procedure a time out was called to verify the correct patient, procedure, equipment, support staff and site/side marked as required   Supporting Documentation  Indications: pain and joint swelling   Procedure Details  Location: knee - R knee  Preparation: Patient was prepped and draped in the usual sterile fashion  Needle gauge: 21.  Approach: anterolateral  Medications administered: 80 mg methylPREDNISolone acetate 80 MG/ML; 4 mL lidocaine (cardiac)  Patient tolerance: patient tolerated the procedure well with no immediate complications    Large Joint Arthrocentesis: L knee  Date/Time: 10/4/2019 9:06 AM  Consent given by: patient  Site marked: site marked  Timeout: Immediately prior to procedure a time out was called to verify the correct patient, procedure, equipment, support staff and site/side marked as required   Supporting Documentation  Indications: pain and joint swelling   Procedure Details  Location: knee - L knee  Preparation: Patient was prepped and draped in the usual sterile fashion  Needle size: 22 G (21)  Approach: anterolateral  Medications administered: 4 mL lidocaine (cardiac); 80 mg methylPREDNISolone acetate 80 MG/ML  Patient tolerance: patient tolerated the procedure well with no immediate complications      Prior to injections risks were discussed including pain and infection.  Patient verbalized understanding would like to proceed with injections    At the conclusion of the injection I discussed the importance of continued quad strengthening exercises on a daily basis. I will see the patient back if the symptoms should  fail to improve or worsen.    Tianna Hale APRN  10/4/2019

## 2019-10-05 DIAGNOSIS — E11.8 DIABETIC COMPLICATION (HCC): ICD-10-CM

## 2019-10-07 RX ORDER — BUDESONIDE AND FORMOTEROL FUMARATE DIHYDRATE 160; 4.5 UG/1; UG/1
AEROSOL RESPIRATORY (INHALATION)
Qty: 30.6 G | Refills: 3 | Status: SHIPPED | OUTPATIENT
Start: 2019-10-07 | End: 2020-10-13

## 2019-12-11 ENCOUNTER — OFFICE VISIT (OUTPATIENT)
Dept: FAMILY MEDICINE CLINIC | Facility: CLINIC | Age: 74
End: 2019-12-11

## 2019-12-11 VITALS
HEIGHT: 62 IN | RESPIRATION RATE: 20 BRPM | SYSTOLIC BLOOD PRESSURE: 146 MMHG | BODY MASS INDEX: 36.07 KG/M2 | WEIGHT: 196 LBS | HEART RATE: 81 BPM | TEMPERATURE: 98.1 F | OXYGEN SATURATION: 97 % | DIASTOLIC BLOOD PRESSURE: 70 MMHG

## 2019-12-11 DIAGNOSIS — B96.89 ACUTE BACTERIAL RHINOSINUSITIS: Primary | ICD-10-CM

## 2019-12-11 DIAGNOSIS — J34.89 NASAL SEPTUM PERFORATION: ICD-10-CM

## 2019-12-11 DIAGNOSIS — J01.90 ACUTE BACTERIAL RHINOSINUSITIS: Primary | ICD-10-CM

## 2019-12-11 PROCEDURE — 94640 AIRWAY INHALATION TREATMENT: CPT | Performed by: NURSE PRACTITIONER

## 2019-12-11 PROCEDURE — 99214 OFFICE O/P EST MOD 30 MIN: CPT | Performed by: NURSE PRACTITIONER

## 2019-12-11 PROCEDURE — 96372 THER/PROPH/DIAG INJ SC/IM: CPT | Performed by: NURSE PRACTITIONER

## 2019-12-11 RX ORDER — AMOXICILLIN AND CLAVULANATE POTASSIUM 875; 125 MG/1; MG/1
1 TABLET, FILM COATED ORAL 2 TIMES DAILY
Qty: 14 TABLET | Refills: 0 | Status: SHIPPED | OUTPATIENT
Start: 2019-12-11 | End: 2020-01-08

## 2019-12-11 RX ORDER — IPRATROPIUM BROMIDE AND ALBUTEROL SULFATE 2.5; .5 MG/3ML; MG/3ML
3 SOLUTION RESPIRATORY (INHALATION) ONCE
Status: COMPLETED | OUTPATIENT
Start: 2019-12-11 | End: 2019-12-11

## 2019-12-11 RX ORDER — TRIAMCINOLONE ACETONIDE 40 MG/ML
40 INJECTION, SUSPENSION INTRA-ARTICULAR; INTRAMUSCULAR ONCE
Status: COMPLETED | OUTPATIENT
Start: 2019-12-11 | End: 2019-12-11

## 2019-12-11 RX ADMIN — TRIAMCINOLONE ACETONIDE 40 MG: 40 INJECTION, SUSPENSION INTRA-ARTICULAR; INTRAMUSCULAR at 13:04

## 2019-12-11 RX ADMIN — IPRATROPIUM BROMIDE AND ALBUTEROL SULFATE 3 ML: 2.5; .5 SOLUTION RESPIRATORY (INHALATION) at 13:04

## 2019-12-11 NOTE — PROGRESS NOTES
Subjective   Deanne Upton is a 74 y.o. female.     Chief Complaint   Patient presents with   • Headache   • URI   • Sinusitis   • Cough      HPI  New patient to me.  Here for couple of weeks of cough, wheezing, nasal congestion, worsening fatigue.    Would seem like it was getting better but then worsens again. Has bronchial asthma and is controlled with symbicort which she uses as prescribed and has albuterol but does not use it much-usually just prior to exercise.  Has not used it since she has been sick. Has had quite a bit of nasal drainage-thick yellow, some sputum but cough is mostly dry.     Has had Frequent nose bleeds almost daily-recently have decreased.  She had her nose packed for bleeding recently and   thinks she has hole in her nose-could feel all the way through. Thought she mentioned to PCP but not sure.    T2DM in good control-A1C have been below 7.    Has been vaccinated for flu.    Social History     Tobacco Use   • Smoking status: Former Smoker   • Smokeless tobacco: Never Used   • Tobacco comment: she smoked 1 pack in her life   Substance Use Topics   • Alcohol use: Yes     Comment: social drinker   • Drug use: No       The following portions of the patient's history were reviewed and updated as appropriate: allergies, current medications, past family history, past medical history, past social history, past surgical history and problem list.    Review of Systems   Constitutional: Positive for activity change, chills and fatigue. Negative for appetite change and fever.   HENT: Positive for congestion, facial swelling, postnasal drip, rhinorrhea, sinus pressure, sinus pain and sore throat. Negative for ear discharge, ear pain and trouble swallowing.    Respiratory: Positive for cough, chest tightness, shortness of breath and wheezing.    Cardiovascular: Negative for chest pain and palpitations.   Gastrointestinal: Negative for abdominal pain, diarrhea, nausea and vomiting.        Couple of  "episodes loose stools   Musculoskeletal: Negative for arthralgias and myalgias.   Allergic/Immunologic: Positive for environmental allergies.   Neurological: Positive for headaches. Negative for dizziness and weakness.       Objective   Blood pressure 146/70, pulse 81, temperature 98.1 °F (36.7 °C), temperature source Oral, resp. rate 20, height 157.5 cm (62\"), weight 88.9 kg (196 lb), SpO2 97 %, not currently breastfeeding.  Body mass index is 35.85 kg/m².    Physical Exam   Constitutional: She is oriented to person, place, and time. She appears well-developed and well-nourished. No distress.   HENT:   Head: Normocephalic and atraumatic.   Right Ear: External ear and ear canal normal. A middle ear effusion is present.   Left Ear: External ear and ear canal normal. Tympanic membrane is erythematous.   Nose: Mucosal edema and rhinorrhea present. Right sinus exhibits maxillary sinus tenderness and frontal sinus tenderness. Left sinus exhibits frontal sinus tenderness.   Mouth/Throat: Posterior oropharyngeal erythema present.   Perforated nasal septum   Eyes: Conjunctivae are normal. Right eye exhibits no discharge. Left eye exhibits no discharge.   Neck: Neck supple.   Cardiovascular: Normal rate and regular rhythm.   Pulmonary/Chest: Effort normal. She has wheezes.   Scattered wheezes noted prior to MN slightly decreased at bases, R>L  Post MN wheezes cleared and improved air [id number]-[id number] verbalized improvement in breathing   Abdominal: Soft. Bowel sounds are normal. There is no tenderness.   Musculoskeletal: She exhibits no deformity.   Gait smooth and steady   Lymphadenopathy:     She has cervical adenopathy (b/l ant. cervical, mildly TTP).   Neurological: She is alert and oriented to person, place, and time.   Skin: Skin is warm and dry. She is not diaphoretic.   Psychiatric: She has a normal mood and affect.   Nursing note and vitals reviewed.      Assessment   Problem List Items Addressed This Visit     None    "   Visit Diagnoses     Acute bacterial rhinosinusitis    -  Primary    Relevant Medications    amoxicillin-clavulanate (AUGMENTIN) 875-125 MG per tablet    triamcinolone acetonide (KENALOG-40) injection 40 mg    ipratropium-albuterol (DUO-NEB) nebulizer solution 3 mL    Nasal septum perforation        Relevant Orders    Ambulatory Referral to ENT (Otolaryngology)           Procedures           Impression and Plan:    Will give augmentin for rhinosinusitis.  Improved with duoneb.  I have instructed her to use albuterol inhaler Q4-6 hrs while awake for the next several days and prn for wheezes in hope to keep her from getting pneumonia which she has had previously. Will give steroid injection today but will try to avoid po steroids as she is diabetic and intolerant of most NSAIDs.     Referral to ENT for recent perforation of nasal septum. Avoid nasal sprays or putting other things in nose until evaluated.     Patient has been instructed to complete all antibiotics, even if feeling better. Call with any problems taking them.   Signs and symptoms of complications from rhinosinusitis reviewed.  Normal course and expected resolution reviewed.  We discussed s/s requiring emergent tx.        Health Maintenance Due   Topic Date Due   • ZOSTER VACCINE (2 of 3) 09/26/2012   • DIABETIC EYE EXAM  04/11/2018   • URINE MICROALBUMIN  08/30/2018   • MAMMOGRAM  04/20/2019              EMR Dragon/Transcription disclaimer:   Much of this encounter note is an electronic transcription/translation of spoken language to printed text. The electronic translation of spoken language may permit erroneous, or at times, nonsensical words or phrases to be inadvertently transcribed; Although I have reviewed the note for such errors, some may still exist.

## 2020-01-08 ENCOUNTER — HOSPITAL ENCOUNTER (OUTPATIENT)
Dept: GENERAL RADIOLOGY | Facility: HOSPITAL | Age: 75
Discharge: HOME | End: 2020-01-08
Admitting: NURSE PRACTITIONER

## 2020-01-08 ENCOUNTER — OFFICE VISIT (OUTPATIENT)
Dept: FAMILY MEDICINE CLINIC | Facility: CLINIC | Age: 75
End: 2020-01-08

## 2020-01-08 VITALS
RESPIRATION RATE: 22 BRPM | OXYGEN SATURATION: 95 % | SYSTOLIC BLOOD PRESSURE: 132 MMHG | DIASTOLIC BLOOD PRESSURE: 74 MMHG | HEIGHT: 62 IN | WEIGHT: 195 LBS | HEART RATE: 86 BPM | BODY MASS INDEX: 35.88 KG/M2 | TEMPERATURE: 98.3 F

## 2020-01-08 DIAGNOSIS — J40 BRONCHITIS: Primary | ICD-10-CM

## 2020-01-08 DIAGNOSIS — J34.89 NASAL SEPTUM PERFORATION: ICD-10-CM

## 2020-01-08 PROCEDURE — 94640 AIRWAY INHALATION TREATMENT: CPT | Performed by: NURSE PRACTITIONER

## 2020-01-08 PROCEDURE — 99214 OFFICE O/P EST MOD 30 MIN: CPT | Performed by: NURSE PRACTITIONER

## 2020-01-08 PROCEDURE — 71046 X-RAY EXAM CHEST 2 VIEWS: CPT

## 2020-01-08 RX ORDER — IPRATROPIUM BROMIDE AND ALBUTEROL SULFATE 2.5; .5 MG/3ML; MG/3ML
3 SOLUTION RESPIRATORY (INHALATION) ONCE
Status: COMPLETED | OUTPATIENT
Start: 2020-01-08 | End: 2020-01-08

## 2020-01-08 RX ORDER — BENZONATATE 200 MG/1
200 CAPSULE ORAL 3 TIMES DAILY PRN
Qty: 30 CAPSULE | Refills: 0 | Status: SHIPPED | OUTPATIENT
Start: 2020-01-08 | End: 2020-01-15 | Stop reason: SDUPTHER

## 2020-01-08 RX ORDER — AMOXICILLIN AND CLAVULANATE POTASSIUM 875; 125 MG/1; MG/1
1 TABLET, FILM COATED ORAL 2 TIMES DAILY
Qty: 14 TABLET | Refills: 0 | Status: SHIPPED | OUTPATIENT
Start: 2020-01-08 | End: 2020-01-15 | Stop reason: SDUPTHER

## 2020-01-08 RX ADMIN — IPRATROPIUM BROMIDE AND ALBUTEROL SULFATE 3 ML: 2.5; .5 SOLUTION RESPIRATORY (INHALATION) at 14:24

## 2020-01-08 NOTE — PROGRESS NOTES
Subjective   Deanne Upton is a 74 y.o. female.     Chief Complaint   Patient presents with   • Cough   • URI      HPI  Pt seen by myself recently for acute visit. She was treated with augmentin and improved x 1-2 weeks but has gradually worsened.  This time it is in her lungs. Had low grade fever on Sunday and has become progressively worse over the last few days.  Has increased wheezing, dyspnea.  Was around someone with similar. Did not go to ENT for perforated nasal septum and would like another referral.  Has been using symbicort and albuterol as directed.  Over last couple of days she has been using albuterol 3-4x per day and seems to help. Tessalon is helpful and almost out.  Using nasal saline occasionally.     Social History     Tobacco Use   • Smoking status: Former Smoker   • Smokeless tobacco: Never Used   • Tobacco comment: she smoked 1 pack in her life   Substance Use Topics   • Alcohol use: Yes     Comment: social drinker   • Drug use: No       The following portions of the patient's history were reviewed and updated as appropriate: allergies, current medications, past family history, past medical history, past social history, past surgical history and problem list.    Review of Systems   Constitutional: Positive for activity change, appetite change, chills, fatigue and fever.   HENT: Positive for postnasal drip and sore throat. Negative for congestion, ear pain and trouble swallowing.    Respiratory: Positive for cough, chest tightness, shortness of breath and wheezing.    Cardiovascular: Negative for chest pain and palpitations.   Gastrointestinal: Negative for abdominal pain, diarrhea, nausea and vomiting.   Musculoskeletal: Negative for arthralgias and myalgias.   Neurological: Negative for weakness and headaches.   Hematological: Positive for adenopathy.       Objective   Blood pressure 132/74, pulse 86, temperature 98.3 °F (36.8 °C), temperature source Oral, resp. rate 22, height 157.5 cm  "(62\"), weight 88.5 kg (195 lb), SpO2 95 %, not currently breastfeeding.  Body mass index is 35.67 kg/m².    Physical Exam   Constitutional: She is oriented to person, place, and time. She appears well-developed and well-nourished. No distress.   HENT:   Head: Normocephalic and atraumatic.   Right Ear: Tympanic membrane, external ear and ear canal normal.   Left Ear: Tympanic membrane, external ear and ear canal normal.   Nose: Mucosal edema present. Right sinus exhibits no maxillary sinus tenderness and no frontal sinus tenderness. Left sinus exhibits no maxillary sinus tenderness and no frontal sinus tenderness.   Mouth/Throat: Posterior oropharyngeal erythema present.   Septal perforation   Eyes: Conjunctivae are normal. Right eye exhibits no discharge. Left eye exhibits no discharge.   Neck: Neck supple.   Cardiovascular: Normal rate and regular rhythm.   Pulmonary/Chest: Effort normal. She has no wheezes.   LLL  Is decreased   Abdominal: Soft. Bowel sounds are normal. There is no tenderness.   Musculoskeletal: She exhibits no deformity.   Gait smooth and steady   Lymphadenopathy:     She has cervical adenopathy.   Neurological: She is alert and oriented to person, place, and time.   Skin: Skin is warm and dry. She is not diaphoretic.   Psychiatric: She has a normal mood and affect.   Nursing note and vitals reviewed.      Assessment   Problem List Items Addressed This Visit     None      Visit Diagnoses     Bronchitis    -  Primary    Relevant Medications    benzonatate (TESSALON) 200 MG capsule    ipratropium-albuterol (DUO-NEB) nebulizer solution 3 mL (Completed)    Other Relevant Orders    XR Chest PA & Lateral (Completed)    Nasal septum perforation        Relevant Orders    Ambulatory Referral to ENT (Otolaryngology)    XR Chest PA & Lateral (Completed)           Procedures           Impression and Plan:  Will refer again to ENT for nasal perforation of recent onset. I will get CXR as she is decreased LLL " to make sure no pneumonia.  She does not have 1 in chart and she thinks it has been several yrs since she had one.  She thinks she has asthma only and not emphysema which in on her problem list. She has done well with augmentin in past and has not had improvement in past on doxycycline or z pack.  Will refill tessalon.  MN before she leaves today as last time she felt improved after x 2-3 days.  Declines steroids.  Declines home MN. Patient has been instructed to complete all antibiotics, even if feeling better. Call with any problems taking them.   Signs and symptoms of complications from bronchitis reviewed.  Normal course and expected resolution reviewed.  We discussed s/s requiring emergent tx.         Health Maintenance Due   Topic Date Due   • ZOSTER VACCINE (2 of 3) 09/26/2012   • DIABETIC EYE EXAM  04/11/2018   • URINE MICROALBUMIN  08/30/2018   • MAMMOGRAM  04/20/2019              EMR Dragon/Transcription disclaimer:   Much of this encounter note is an electronic transcription/translation of spoken language to printed text. The electronic translation of spoken language may permit erroneous, or at times, nonsensical words or phrases to be inadvertently transcribed; Although I have reviewed the note for such errors, some may still exist.

## 2020-01-09 NOTE — PROGRESS NOTES
Spoke in detail with Patient about results, patient expressed understanding and will follow up as agreed.     Any pending Labs and/or Diagnostic procedures required have been ordered for future release.    Alexandrea KRUEGER

## 2020-01-10 ENCOUNTER — CLINICAL SUPPORT (OUTPATIENT)
Dept: ORTHOPEDIC SURGERY | Facility: CLINIC | Age: 75
End: 2020-01-10

## 2020-01-10 VITALS — HEIGHT: 64 IN | TEMPERATURE: 98.2 F | BODY MASS INDEX: 32.44 KG/M2 | WEIGHT: 190 LBS

## 2020-01-10 DIAGNOSIS — M17.0 PRIMARY OSTEOARTHRITIS OF BOTH KNEES: Primary | ICD-10-CM

## 2020-01-10 PROCEDURE — 20610 DRAIN/INJ JOINT/BURSA W/O US: CPT | Performed by: NURSE PRACTITIONER

## 2020-01-10 RX ORDER — METHYLPREDNISOLONE ACETATE 80 MG/ML
80 INJECTION, SUSPENSION INTRA-ARTICULAR; INTRALESIONAL; INTRAMUSCULAR; SOFT TISSUE
Status: COMPLETED | OUTPATIENT
Start: 2020-01-10 | End: 2020-01-10

## 2020-01-10 RX ADMIN — METHYLPREDNISOLONE ACETATE 80 MG: 80 INJECTION, SUSPENSION INTRA-ARTICULAR; INTRALESIONAL; INTRAMUSCULAR; SOFT TISSUE at 08:02

## 2020-01-10 NOTE — PROGRESS NOTES
1/10/2020    Deanne Upton is here today for worsening knee pain. Pt has undergone injection of the knee in the past with good resolution of symptoms. Pt is requesting a repeat injection.     KNEE Injection Procedure Note:    Large Joint Arthrocentesis: R knee  Date/Time: 1/10/2020 8:02 AM  Consent given by: patient  Site marked: site marked  Timeout: Immediately prior to procedure a time out was called to verify the correct patient, procedure, equipment, support staff and site/side marked as required   Supporting Documentation  Indications: pain and joint swelling   Procedure Details  Location: knee - R knee  Preparation: Patient was prepped and draped in the usual sterile fashion  Needle gauge: 21.  Approach: anterolateral  Medications administered: 80 mg methylPREDNISolone acetate 80 MG/ML; 4 mL lidocaine (cardiac)  Patient tolerance: patient tolerated the procedure well with no immediate complications    Large Joint Arthrocentesis: L knee  Date/Time: 1/10/2020 8:02 AM  Consent given by: patient  Site marked: site marked  Timeout: Immediately prior to procedure a time out was called to verify the correct patient, procedure, equipment, support staff and site/side marked as required   Supporting Documentation  Indications: pain and joint swelling   Procedure Details  Location: knee - L knee  Preparation: Patient was prepped and draped in the usual sterile fashion  Needle gauge: 21.  Approach: anterolateral  Medications administered: 4 mL lidocaine (cardiac); 80 mg methylPREDNISolone acetate 80 MG/ML  Patient tolerance: patient tolerated the procedure well with no immediate complications          At the conclusion of the injection I discussed the importance of continued quad strengthening exercises on a daily basis. I will see the patient back if the symptoms should fail to improve or worsen.    Tianna Hale, APRN    1/10/2020

## 2020-01-15 ENCOUNTER — OFFICE VISIT (OUTPATIENT)
Dept: FAMILY MEDICINE CLINIC | Facility: CLINIC | Age: 75
End: 2020-01-15

## 2020-01-15 VITALS
RESPIRATION RATE: 22 BRPM | HEIGHT: 62 IN | WEIGHT: 195 LBS | DIASTOLIC BLOOD PRESSURE: 78 MMHG | TEMPERATURE: 98.7 F | HEART RATE: 90 BPM | BODY MASS INDEX: 35.88 KG/M2 | SYSTOLIC BLOOD PRESSURE: 146 MMHG | OXYGEN SATURATION: 95 %

## 2020-01-15 DIAGNOSIS — J01.90 ACUTE BACTERIAL RHINOSINUSITIS: Primary | ICD-10-CM

## 2020-01-15 DIAGNOSIS — B96.89 ACUTE BACTERIAL RHINOSINUSITIS: Primary | ICD-10-CM

## 2020-01-15 DIAGNOSIS — J40 BRONCHITIS: ICD-10-CM

## 2020-01-15 PROCEDURE — 99213 OFFICE O/P EST LOW 20 MIN: CPT | Performed by: NURSE PRACTITIONER

## 2020-01-15 RX ORDER — BENZONATATE 200 MG/1
200 CAPSULE ORAL 3 TIMES DAILY PRN
Qty: 30 CAPSULE | Refills: 0 | Status: SHIPPED | OUTPATIENT
Start: 2020-01-15 | End: 2020-03-31 | Stop reason: SDUPTHER

## 2020-01-15 RX ORDER — CLOTRIMAZOLE 10 MG/1
10 LOZENGE ORAL; TOPICAL
Qty: 35 TABLET | Refills: 0 | Status: SHIPPED | OUTPATIENT
Start: 2020-01-15 | End: 2020-02-28

## 2020-01-15 RX ORDER — AMOXICILLIN AND CLAVULANATE POTASSIUM 875; 125 MG/1; MG/1
1 TABLET, FILM COATED ORAL 2 TIMES DAILY
Qty: 14 TABLET | Refills: 0 | Status: SHIPPED | OUTPATIENT
Start: 2020-01-15 | End: 2020-02-06

## 2020-01-15 NOTE — PROGRESS NOTES
Subjective   Deanne Upton is a 74 y.o. female.     Chief Complaint   Patient presents with   • Bronchitis     follow up      HPI  Returns for ongoing sinusitis and bronchitis.  Feels like she is just now starting to feel better on augmentin.  She still has cough and is able to cough up sputum which is still thick and cream colored but is starting to decrease.  Her fatigue has gotten a little better.  Her left ear has started getting painful and feels full.  She has appt with ENT this Friday for perforated septum and has been avoiding nasal sprays as we dicussed other than saline until she is seen. Still getting a little short of breath and wheezy when she exerts.  Using inhalers as directed.  Tessalon is helpful but she has been only using sparingly since it was expensive.  Had first day of feeling like she had some energy and was less dyspneic this past Sunday and got out of the house for awhile, but then was exhausted the next days until today.    Overall feels better, but just not well yet and would like to extend abx for short time as it usually takes awhile for her to get over infections.     Social History     Tobacco Use   • Smoking status: Former Smoker   • Smokeless tobacco: Never Used   • Tobacco comment: she smoked 1 pack in her life   Substance Use Topics   • Alcohol use: Yes     Comment: social drinker   • Drug use: No       The following portions of the patient's history were reviewed and updated as appropriate: allergies, current medications, past family history, past medical history, past social history, past surgical history and problem list.    Review of Systems   Constitutional: Positive for activity change, appetite change and fatigue.   HENT: Positive for congestion, ear pain, postnasal drip, rhinorrhea and sinus pressure. Negative for ear discharge, sore throat, trouble swallowing and voice change.    Respiratory: Positive for cough, chest tightness, shortness of breath and wheezing.   "  Cardiovascular: Negative for chest pain and palpitations.   Gastrointestinal: Negative for abdominal pain, diarrhea, nausea and vomiting.   Musculoskeletal: Negative for arthralgias and myalgias.   Neurological: Negative for dizziness, weakness and headaches.       Objective   Blood pressure 146/78, pulse 90, temperature 98.7 °F (37.1 °C), temperature source Oral, resp. rate 22, height 157.5 cm (62\"), weight 88.5 kg (195 lb), SpO2 95 %, not currently breastfeeding.  Body mass index is 35.67 kg/m².    Physical Exam   Constitutional: She is oriented to person, place, and time. She appears well-developed and well-nourished. No distress.   HENT:   Head: Normocephalic and atraumatic.   Right Ear: External ear and ear canal normal. Tympanic membrane is not erythematous. A middle ear effusion is present.   Left Ear: External ear and ear canal normal. Tympanic membrane is erythematous and bulging.   Mouth/Throat: Posterior oropharyngeal erythema present.   Eyes: Conjunctivae are normal. Right eye exhibits no discharge. Left eye exhibits no discharge.   Neck: Neck supple.   Cardiovascular: Normal rate and regular rhythm.   Pulmonary/Chest: Effort normal. She has no wheezes. She has no rales.    a little decreased at bases b/l but o/w CTAB   Abdominal: Soft. Bowel sounds are normal. There is no tenderness.   Musculoskeletal: She exhibits no deformity.   Gait smooth and steady   Lymphadenopathy:     She has no cervical adenopathy.   Neurological: She is alert and oriented to person, place, and time.   Skin: Skin is warm and dry. She is not diaphoretic.   Psychiatric: She has a normal mood and affect.   Nursing note and vitals reviewed.      Assessment   Problem List Items Addressed This Visit     None      Visit Diagnoses     Acute bacterial rhinosinusitis    -  Primary    Relevant Medications    amoxicillin-clavulanate (AUGMENTIN) 875-125 MG per tablet    benzonatate (TESSALON) 200 MG capsule    Bronchitis        Relevant " Medications    benzonatate (TESSALON) 200 MG capsule           Procedures           Impression and Plan:  Will extend meds x 1 week since she seems to be improving.  We discussed inhaler use, avoiding nasal sprays other than saline until ENT visit. CXR done after last visit on 1/8/19 was normal. Reviewed with pt.  Tessalon refilled and Good Rx card given.   Will see ENT on Friday.   We discussed s/s requiring emergent tx. Signs and symptoms of complications from bronchitis reviewed.  Normal course and expected resolution reviewed.        Health Maintenance Due   Topic Date Due   • ZOSTER VACCINE (2 of 3) 09/26/2012   • DIABETIC EYE EXAM  04/11/2018   • URINE MICROALBUMIN  08/30/2018   • MAMMOGRAM  04/20/2019              EMR Dragon/Transcription disclaimer:   Much of this encounter note is an electronic transcription/translation of spoken language to printed text. The electronic translation of spoken language may permit erroneous, or at times, nonsensical words or phrases to be inadvertently transcribed; Although I have reviewed the note for such errors, some may still exist.

## 2020-02-03 ENCOUNTER — LAB (OUTPATIENT)
Dept: FAMILY MEDICINE CLINIC | Facility: CLINIC | Age: 75
End: 2020-02-03

## 2020-02-03 DIAGNOSIS — I10 ESSENTIAL HYPERTENSION: Primary | ICD-10-CM

## 2020-02-03 DIAGNOSIS — E78.1 PURE HYPERGLYCERIDEMIA: ICD-10-CM

## 2020-02-03 DIAGNOSIS — E11.69 TYPE 2 DIABETES MELLITUS WITH OTHER SPECIFIED COMPLICATION, WITHOUT LONG-TERM CURRENT USE OF INSULIN (HCC): ICD-10-CM

## 2020-02-03 RX ORDER — MONTELUKAST SODIUM 10 MG/1
TABLET ORAL
Qty: 90 TABLET | Refills: 0 | Status: SHIPPED | OUTPATIENT
Start: 2020-02-03 | End: 2020-06-08 | Stop reason: SDUPTHER

## 2020-02-03 RX ORDER — SIMVASTATIN 10 MG
TABLET ORAL
Qty: 90 TABLET | Refills: 1 | Status: SHIPPED | OUTPATIENT
Start: 2020-02-03 | End: 2021-01-25

## 2020-02-04 LAB
BUN SERPL-MCNC: 23 MG/DL (ref 8–23)
BUN/CREAT SERPL: 19.5 (ref 7–25)
CALCIUM SERPL-MCNC: 9.5 MG/DL (ref 8.6–10.5)
CHLORIDE SERPL-SCNC: 101 MMOL/L (ref 98–107)
CHOLEST SERPL-MCNC: 134 MG/DL (ref 0–200)
CO2 SERPL-SCNC: 21.7 MMOL/L (ref 22–29)
CREAT SERPL-MCNC: 1.18 MG/DL (ref 0.57–1)
CREAT UR-MCNC: 71.4 MG/DL
ERYTHROCYTE [DISTWIDTH] IN BLOOD BY AUTOMATED COUNT: 12.6 % (ref 12.3–15.4)
GLUCOSE SERPL-MCNC: 119 MG/DL (ref 65–99)
HBA1C MFR BLD: 6.6 % (ref 4.8–5.6)
HCT VFR BLD AUTO: 40.7 % (ref 34–46.6)
HDLC SERPL-MCNC: 53 MG/DL (ref 40–60)
HGB BLD-MCNC: 13.8 G/DL (ref 12–15.9)
LDLC SERPL CALC-MCNC: 51 MG/DL (ref 0–100)
MCH RBC QN AUTO: 30.2 PG (ref 26.6–33)
MCHC RBC AUTO-ENTMCNC: 33.9 G/DL (ref 31.5–35.7)
MCV RBC AUTO: 89.1 FL (ref 79–97)
PLATELET # BLD AUTO: 232 10*3/MM3 (ref 140–450)
POTASSIUM SERPL-SCNC: 4.4 MMOL/L (ref 3.5–5.2)
PROT UR-MCNC: 9 MG/DL
PROT/CREAT UR: 126.1 MG/G CREA (ref 0–200)
RBC # BLD AUTO: 4.57 10*6/MM3 (ref 3.77–5.28)
SODIUM SERPL-SCNC: 139 MMOL/L (ref 136–145)
TRIGL SERPL-MCNC: 149 MG/DL (ref 0–150)
VLDLC SERPL CALC-MCNC: 29.8 MG/DL (ref 5–40)
WBC # BLD AUTO: 6.7 10*3/MM3 (ref 3.4–10.8)

## 2020-02-06 ENCOUNTER — OFFICE VISIT (OUTPATIENT)
Dept: FAMILY MEDICINE CLINIC | Facility: CLINIC | Age: 75
End: 2020-02-06

## 2020-02-06 VITALS
RESPIRATION RATE: 13 BRPM | HEART RATE: 75 BPM | SYSTOLIC BLOOD PRESSURE: 122 MMHG | HEIGHT: 62 IN | WEIGHT: 198.8 LBS | OXYGEN SATURATION: 98 % | TEMPERATURE: 98.1 F | DIASTOLIC BLOOD PRESSURE: 72 MMHG | BODY MASS INDEX: 36.58 KG/M2

## 2020-02-06 DIAGNOSIS — M17.12 PRIMARY OSTEOARTHRITIS OF LEFT KNEE: ICD-10-CM

## 2020-02-06 DIAGNOSIS — E11.69 TYPE 2 DIABETES MELLITUS WITH OTHER SPECIFIED COMPLICATION, WITHOUT LONG-TERM CURRENT USE OF INSULIN (HCC): Primary | ICD-10-CM

## 2020-02-06 DIAGNOSIS — I10 ESSENTIAL HYPERTENSION: ICD-10-CM

## 2020-02-06 PROCEDURE — 99214 OFFICE O/P EST MOD 30 MIN: CPT | Performed by: FAMILY MEDICINE

## 2020-02-06 NOTE — PROGRESS NOTES
"Chief Complaint   Patient presents with   • Diabetes   • Med Management     DM foot screen        Subjective   Deanne Upton is a 74 y.o. female.     History of Present Illness   DM  She does not watch the sugars. Says if her A1c is good here and stays good then she does not watch it.   Had labs prior coming and here to discuss.   She should have eye exam coming up next month. Dr. Chen in [place]  She has not yet gotten the shingles shot.   She needs foot exam today. She has a lot of foot trouble on the left, she has arthritis pain and bunion pain but not a candidate for surgery given the procedure and risk of worsening. She has declined. Has seen two doctors for this.   Pneumonia shot is up to date.    The following portions of the patient's history were reviewed and updated as appropriate: allergies, current medications, past medical history, past social history and problem list.    Review of Systems   Respiratory: Positive for cough.         In am only with rise    Cardiovascular: Negative.    Neurological: Positive for weakness.        Left leg    she says her knee is really bad on that side and so for this weakness and not being able to get surgery to fix it. She does knee injections.   She is also really cautious.   She walked 2 miles two days in the row recently and she felt great about it and her body is feeling better than it has in a long time. She is back on her symbicort after not having it on vacation. She is improving. Sees allergy, is to take her albuterol prn and then a few times per day for treatment. She is doing this.     /72 (BP Location: Left arm, Patient Position: Sitting, Cuff Size: Adult)   Pulse 75   Temp 98.1 °F (36.7 °C) (Oral)   Resp 13   Ht 157.5 cm (62\")   Wt 90.2 kg (198 lb 12.8 oz)   LMP  (LMP Unknown)   SpO2 98%   Breastfeeding No   BMI 36.36 kg/m²       Objective   Physical Exam   Constitutional: She is oriented to person, place, and time. She appears " well-nourished. No distress.   Eyes: Conjunctivae are normal. Right eye exhibits no discharge. Left eye exhibits no discharge. No scleral icterus.   Cardiovascular: Normal rate, regular rhythm, normal heart sounds and intact distal pulses. Exam reveals no gallop and no friction rub.   No murmur heard.  Pulmonary/Chest: Effort normal and breath sounds normal. No respiratory distress. She has no wheezes.   Musculoskeletal: She exhibits no edema.    Deanne had a diabetic foot exam performed today.   During the foot exam she had a monofilament test performed.  Vascular Status -  Her right foot exhibits normal foot vasculature  and no edema. Her left foot exhibits normal foot vasculature  and no edema.  Skin Integrity  -  Her right foot skin is intact. She has callous right foot and right heel is dry and cracked.  She has no right foot onychomycosis, no right foot ulcer, no ingrown toenail on right foot, no right foot warmth and no right foot blister.Her left foot skin is intact.She has callous left foot and left heel dry and cracked. She has no left foot onychomycosis, no left foot ulcer, no left ingrown toenail, no left foot warmth and no left foot blister..   Foot Structure and Biomechanics -  Her right foot has no hallux valgus and no hammer toes present. Her left foot has no hallux valgus and no hammer toes.  Neurological: She is alert and oriented to person, place, and time.   Psychiatric: She has a normal mood and affect. Her behavior is normal.   Vitals reviewed.      Assessment/Plan   Deanne was seen today for diabetes and med management.    Diagnoses and all orders for this visit:    Type 2 diabetes mellitus with other specified complication, without long-term current use of insulin (CMS/[facility])    Essential hypertension    Primary osteoarthritis of left knee    Her A1c is 6.6% and up a little but still good control. Continue   Will check vitamin D for her next visit. She takes metformin. She is getting active again  and this feels good. I encouraged her to continue this. This will be good in the long run for her overall health and joint pain. She agreed and is going to continue walking. She is working on her house and cleaning.     BP well controlled.   She gets injections with ortho. I recommended being active to strengthen the muscle above that knee. She says she is weak but then recently has walked 2 miles on two consecutive days and says she feels great.

## 2020-02-24 RX ORDER — LISINOPRIL AND HYDROCHLOROTHIAZIDE 12.5; 1 MG/1; MG/1
1 TABLET ORAL DAILY
Qty: 90 TABLET | Refills: 2 | Status: SHIPPED | OUTPATIENT
Start: 2020-02-24 | End: 2020-11-19

## 2020-02-28 ENCOUNTER — HOSPITAL ENCOUNTER (OUTPATIENT)
Dept: GENERAL RADIOLOGY | Facility: HOSPITAL | Age: 75
Discharge: HOME | End: 2020-02-28
Admitting: FAMILY MEDICINE

## 2020-02-28 ENCOUNTER — OFFICE VISIT (OUTPATIENT)
Dept: FAMILY MEDICINE CLINIC | Facility: CLINIC | Age: 75
End: 2020-02-28

## 2020-02-28 VITALS
BODY MASS INDEX: 36.2 KG/M2 | OXYGEN SATURATION: 99 % | RESPIRATION RATE: 16 BRPM | SYSTOLIC BLOOD PRESSURE: 124 MMHG | HEART RATE: 106 BPM | HEIGHT: 62 IN | WEIGHT: 196.7 LBS | TEMPERATURE: 98.6 F | DIASTOLIC BLOOD PRESSURE: 68 MMHG

## 2020-02-28 DIAGNOSIS — E11.69 TYPE 2 DIABETES MELLITUS WITH OTHER SPECIFIED COMPLICATION, WITHOUT LONG-TERM CURRENT USE OF INSULIN (HCC): Primary | ICD-10-CM

## 2020-02-28 DIAGNOSIS — S69.92XA INJURY OF FINGER OF LEFT HAND, INITIAL ENCOUNTER: ICD-10-CM

## 2020-02-28 DIAGNOSIS — M79.645 PAIN OF FINGER OF LEFT HAND: ICD-10-CM

## 2020-02-28 PROCEDURE — 99213 OFFICE O/P EST LOW 20 MIN: CPT | Performed by: FAMILY MEDICINE

## 2020-02-28 PROCEDURE — 73140 X-RAY EXAM OF FINGER(S): CPT

## 2020-03-04 ENCOUNTER — TELEPHONE (OUTPATIENT)
Dept: FAMILY MEDICINE CLINIC | Facility: CLINIC | Age: 75
End: 2020-03-04

## 2020-03-04 NOTE — TELEPHONE ENCOUNTER
Spoke to patient and informed her of her results and patient understood what was explained. No further questions or concerns.

## 2020-03-04 NOTE — TELEPHONE ENCOUNTER
PATIENT DOES NOT ACCESS [employer]. SHE WOULD LIKE FOR SOMEONE TO CALL HER ABOUT HER XRAY RESULTS FOR HER FINGER.     PATIENT CALLBACK [phone]

## 2020-03-31 ENCOUNTER — TELEPHONE (OUTPATIENT)
Dept: FAMILY MEDICINE CLINIC | Facility: CLINIC | Age: 75
End: 2020-03-31

## 2020-03-31 DIAGNOSIS — B96.89 ACUTE BACTERIAL RHINOSINUSITIS: ICD-10-CM

## 2020-03-31 DIAGNOSIS — J01.90 ACUTE BACTERIAL RHINOSINUSITIS: ICD-10-CM

## 2020-03-31 RX ORDER — BENZONATATE 200 MG/1
200 CAPSULE ORAL 3 TIMES DAILY PRN
Qty: 30 CAPSULE | Refills: 0 | Status: SHIPPED | OUTPATIENT
Start: 2020-03-31 | End: 2022-04-04 | Stop reason: SDUPTHER

## 2020-03-31 RX ORDER — BENZONATATE 200 MG/1
200 CAPSULE ORAL 3 TIMES DAILY PRN
Qty: 30 CAPSULE | Refills: 0 | Status: SHIPPED | OUTPATIENT
Start: 2020-03-31 | End: 2020-03-31

## 2020-03-31 NOTE — TELEPHONE ENCOUNTER
Spoke with pt and she stated that this is the time of year she has trouble. Symptoms are coughing up green sputum a couple of times a day, Fatigue, Sinus pain above right eye, and has been checking temp 3 times a day and no fever.     Angelo on [address] confirmed.      Patient stated that if  sends in Benzonatate the she would want the medication sent to [facility] on [address]      -[facility]

## 2020-03-31 NOTE — TELEPHONE ENCOUNTER
Called and informed patient about rx sent along with if her symptoms not getting better to call for televisit. Gave her [employer] number to get back into her account. Also informed her Dr. Urbina is out of office this week

## 2020-03-31 NOTE — TELEPHONE ENCOUNTER
I  will send the prescription of benzonatate to [facility]   Let her know dr lovett is out of the office this week but if her symptoms does notget better she can danial appointment, tele visit with her

## 2020-03-31 NOTE — TELEPHONE ENCOUNTER
Patient called stating that she has been coughing up green fleem and has pain over her right eye. Patient would like to know if a prescription can be called in for her. Please advise.     Angelo on [address] confirmed.     Patient stated that if  sends in Benzonatate the she would want the medication sent to [facility] on [address].     Patient would like a call back at: [phone].

## 2020-05-21 ENCOUNTER — CLINICAL SUPPORT (OUTPATIENT)
Dept: ORTHOPEDIC SURGERY | Facility: CLINIC | Age: 75
End: 2020-05-21

## 2020-05-21 VITALS — HEIGHT: 62 IN | WEIGHT: 193 LBS | TEMPERATURE: 97.9 F | BODY MASS INDEX: 35.51 KG/M2

## 2020-05-21 DIAGNOSIS — M17.0 PRIMARY OSTEOARTHRITIS OF BOTH KNEES: Primary | ICD-10-CM

## 2020-05-21 PROCEDURE — 20610 DRAIN/INJ JOINT/BURSA W/O US: CPT | Performed by: NURSE PRACTITIONER

## 2020-05-21 RX ORDER — METHYLPREDNISOLONE ACETATE 80 MG/ML
80 INJECTION, SUSPENSION INTRA-ARTICULAR; INTRALESIONAL; INTRAMUSCULAR; SOFT TISSUE
Status: COMPLETED | OUTPATIENT
Start: 2020-05-21 | End: 2020-05-21

## 2020-05-21 RX ADMIN — METHYLPREDNISOLONE ACETATE 80 MG: 80 INJECTION, SUSPENSION INTRA-ARTICULAR; INTRALESIONAL; INTRAMUSCULAR; SOFT TISSUE at 14:52

## 2020-05-21 NOTE — PROGRESS NOTES
Patient: Deanne Upton  YOB: 1945 75 y.o. female  Medical Record Number: [id number]    Chief Complaints:   Chief Complaint   Patient presents with   • Left Knee - Follow-up, Pain   • Right Knee - Follow-up, Pain       History of Present Illness:Deanne Upton is a 75 y.o. female who presents with complaints of bilateral knee pain.  She describes a bilateral knee pain as a moderate sometimes severe intermittent ache worse with standing walking, better with rest.  She denies any injury    Allergies:   Allergies   Allergen Reactions   • Ibuprofen Swelling     Lips swell   • Naproxen GI Intolerance     Can't remember    • Nsaids GI Intolerance     Was having some kidney problems   • Tramadol GI Intolerance     Visual complaint (light show with eyes closes), joint pain, drowsy,bloody stool with diarrhea!!!!!!!       Medications:   Current Outpatient Medications   Medication Sig Dispense Refill   • acetaminophen (TYLENOL) 650 MG 8 hr tablet Take 650 mg by mouth 3 (Three) Times a Day.     • albuterol (PROVENTIL HFA;VENTOLIN HFA) 108 (90 Base) MCG/ACT inhaler Inhale 2 puffs Every 4 (Four) Hours As Needed for Wheezing. 1 inhaler 11   • B Complex-C-Folic Acid (B COMPLEX + C TR) tablet controlled-release Take by mouth.     • benzonatate (TESSALON) 200 MG capsule Take 1 capsule by mouth 3 (Three) Times a Day As Needed for Cough. 30 capsule 0   • Cetirizine HCl 10 MG capsule Take 10 mg by mouth 2 (Two) Times a Day.     • Cholecalciferol (VITAMIN D) 2000 UNITS capsule Take by mouth.     • diphenhydrAMINE-acetaminophen (TYLENOL PM)  MG tablet per tablet Take 1 tablet by mouth At Night As Needed for Sleep.     • lisinopril-hydrochlorothiazide (PRINZIDE,ZESTORETIC) 10-12.5 MG per tablet TAKE 1 TABLET BY MOUTH  DAILY 90 tablet 2   • melatonin 5 MG tablet tablet Take 5 mg by mouth Every Night.     • metFORMIN (GLUCOPHAGE) 500 MG tablet TAKE 2 TABLETS BY MOUTH TWO TIMES DAILY 360 tablet 2   • mometasone  "(NASONEX) 50 MCG/ACT nasal spray 2 sprays into the nostril(s) as directed by provider Daily.     • montelukast (SINGULAIR) 10 MG tablet TAKE 1 TABLET BY MOUTH  EVERY NIGHT 90 tablet 0   • Multiple Vitamin (MULTI VITAMIN DAILY PO) Take by mouth.     • Omega-3 Fatty Acids (FISH OIL) 600 MG capsule Take 350 mg by mouth.     • Peak Flow Meter device Use to assess breathing 1 each 0   • simvastatin (ZOCOR) 10 MG tablet TAKE 1 TABLET BY MOUTH  EVERY NIGHT 90 tablet 1   • Spacer/Aero Chamber Mouthpiece misc Use with inhalers for every breathing treatment 1 each 1   • SYMBICORT 160-4.5 MCG/ACT inhaler USE 2 PUFFS TWO TIMES DAILY 30.6 g 3     No current facility-administered medications for this visit.          The following portions of the patient's history were reviewed and updated as appropriate: allergies, current medications, past family history, past medical history, past social history, past surgical history and problem list.    Review of Systems:   A 14 point review of systems was performed. All systems negative except pertinent positives/negative listed in HPI above    Physical Exam:   Vitals:    05/21/20 1118   Temp: 97.9 °F (36.6 °C)   TempSrc: Temporal   Weight: 87.5 kg (193 lb)   Height: 157.5 cm (62\")   PainSc:   7   PainLoc: Knee       General: A and O x 3, ASA, NAD    SCLERA:    Normal    DENTITION:   Normal  Skin clear no unusual lesions noted  Bilateral knees patient has no appreciable effusion she does have limited range of motion secondary to pain with a positive Correia negative Lockman calf soft nontender    Radiology:  Xrays 3views (ap,lateral, sunrise) previous x-rays were reviewed and do show arthritic changes    Assessment/Plan:  Osteoarthritis bilateral knees    Patient discussed treatment options.  She would like to proceed with bilateral knee cortisone injections.  Prior to injections risks were discussed including pain infection.  Patient verbalized understanding would like to proceed with " injections.  She will continue with physical therapy exercises and I will see her back as needed    Large Joint Arthrocentesis: R knee  Date/Time: 5/21/2020 2:52 PM  Consent given by: patient  Site marked: site marked  Timeout: Immediately prior to procedure a time out was called to verify the correct patient, procedure, equipment, support staff and site/side marked as required   Supporting Documentation  Indications: pain and joint swelling   Procedure Details  Location: knee - R knee  Preparation: Patient was prepped and draped in the usual sterile fashion  Needle size: 22 G  Approach: anterolateral  Medications administered: 2 mL lidocaine (cardiac); 80 mg methylPREDNISolone acetate 80 MG/ML  Patient tolerance: patient tolerated the procedure well with no immediate complications    Large Joint Arthrocentesis: L knee  Date/Time: 5/21/2020 2:52 PM  Consent given by: patient  Site marked: site marked  Timeout: Immediately prior to procedure a time out was called to verify the correct patient, procedure, equipment, support staff and site/side marked as required   Supporting Documentation  Indications: pain and joint swelling   Procedure Details  Location: knee - L knee  Preparation: Patient was prepped and draped in the usual sterile fashion  Needle size: 22 G  Approach: anterolateral  Medications administered: 80 mg methylPREDNISolone acetate 80 MG/ML; 2 mL lidocaine (cardiac)  Patient tolerance: patient tolerated the procedure well with no immediate complications

## 2020-06-09 RX ORDER — MONTELUKAST SODIUM 10 MG/1
10 TABLET ORAL NIGHTLY
Qty: 90 TABLET | Refills: 1 | Status: SHIPPED | OUTPATIENT
Start: 2020-06-09 | End: 2020-10-13

## 2020-08-14 DIAGNOSIS — E78.1 PURE HYPERGLYCERIDEMIA: ICD-10-CM

## 2020-08-14 DIAGNOSIS — E55.9 VITAMIN D DEFICIENCY: Primary | ICD-10-CM

## 2020-08-14 DIAGNOSIS — I10 HYPERTENSION, UNSPECIFIED TYPE: ICD-10-CM

## 2020-08-14 DIAGNOSIS — E11.43 TYPE 2 DIABETES MELLITUS WITH AUTONOMIC NEUROPATHY, UNSPECIFIED WHETHER LONG TERM INSULIN USE (HCC): ICD-10-CM

## 2020-08-18 LAB
ALBUMIN SERPL-MCNC: 4.5 G/DL (ref 3.5–5.2)
ALBUMIN/GLOB SERPL: 2.4 G/DL
ALP SERPL-CCNC: 74 U/L (ref 39–117)
ALT SERPL-CCNC: 16 U/L (ref 1–33)
AST SERPL-CCNC: 16 U/L (ref 1–32)
BASOPHILS # BLD AUTO: 0.05 10*3/MM3 (ref 0–0.2)
BASOPHILS NFR BLD AUTO: 0.8 % (ref 0–1.5)
BILIRUB SERPL-MCNC: 0.4 MG/DL (ref 0–1.2)
BUN SERPL-MCNC: 19 MG/DL (ref 8–23)
BUN/CREAT SERPL: 16.1 (ref 7–25)
CALCIUM SERPL-MCNC: 9.4 MG/DL (ref 8.6–10.5)
CHLORIDE SERPL-SCNC: 103 MMOL/L (ref 98–107)
CHOLEST SERPL-MCNC: 138 MG/DL (ref 0–200)
CO2 SERPL-SCNC: 26 MMOL/L (ref 22–29)
CREAT SERPL-MCNC: 1.18 MG/DL (ref 0.57–1)
EOSINOPHIL # BLD AUTO: 0.31 10*3/MM3 (ref 0–0.4)
EOSINOPHIL NFR BLD AUTO: 5.2 % (ref 0.3–6.2)
ERYTHROCYTE [DISTWIDTH] IN BLOOD BY AUTOMATED COUNT: 12.5 % (ref 12.3–15.4)
GLOBULIN SER CALC-MCNC: 1.9 GM/DL
GLUCOSE SERPL-MCNC: 136 MG/DL (ref 65–99)
HBA1C MFR BLD: 6.1 % (ref 4.8–5.6)
HCT VFR BLD AUTO: 40.5 % (ref 34–46.6)
HDLC SERPL-MCNC: 41 MG/DL (ref 40–60)
HGB BLD-MCNC: 13.6 G/DL (ref 12–15.9)
IMM GRANULOCYTES # BLD AUTO: 0.01 10*3/MM3 (ref 0–0.05)
IMM GRANULOCYTES NFR BLD AUTO: 0.2 % (ref 0–0.5)
LDLC SERPL CALC-MCNC: 52 MG/DL (ref 0–100)
LYMPHOCYTES # BLD AUTO: 2.2 10*3/MM3 (ref 0.7–3.1)
LYMPHOCYTES NFR BLD AUTO: 37.2 % (ref 19.6–45.3)
MCH RBC QN AUTO: 29.9 PG (ref 26.6–33)
MCHC RBC AUTO-ENTMCNC: 33.6 G/DL (ref 31.5–35.7)
MCV RBC AUTO: 89 FL (ref 79–97)
MONOCYTES # BLD AUTO: 0.6 10*3/MM3 (ref 0.1–0.9)
MONOCYTES NFR BLD AUTO: 10.2 % (ref 5–12)
NEUTROPHILS # BLD AUTO: 2.74 10*3/MM3 (ref 1.7–7)
NEUTROPHILS NFR BLD AUTO: 46.4 % (ref 42.7–76)
NRBC BLD AUTO-RTO: 0 /100 WBC (ref 0–0.2)
PLATELET # BLD AUTO: 258 10*3/MM3 (ref 140–450)
POTASSIUM SERPL-SCNC: 4.5 MMOL/L (ref 3.5–5.2)
PROT SERPL-MCNC: 6.4 G/DL (ref 6–8.5)
RBC # BLD AUTO: 4.55 10*6/MM3 (ref 3.77–5.28)
SODIUM SERPL-SCNC: 141 MMOL/L (ref 136–145)
TRIGL SERPL-MCNC: 226 MG/DL (ref 0–150)
VLDLC SERPL CALC-MCNC: 45.2 MG/DL
WBC # BLD AUTO: 5.91 10*3/MM3 (ref 3.4–10.8)

## 2020-08-20 ENCOUNTER — OFFICE VISIT (OUTPATIENT)
Dept: FAMILY MEDICINE CLINIC | Facility: CLINIC | Age: 75
End: 2020-08-20

## 2020-08-20 VITALS
WEIGHT: 195.1 LBS | HEART RATE: 84 BPM | HEIGHT: 62 IN | OXYGEN SATURATION: 98 % | RESPIRATION RATE: 16 BRPM | TEMPERATURE: 96.9 F | SYSTOLIC BLOOD PRESSURE: 136 MMHG | BODY MASS INDEX: 35.9 KG/M2 | DIASTOLIC BLOOD PRESSURE: 66 MMHG

## 2020-08-20 DIAGNOSIS — J30.2 SEASONAL ALLERGIC RHINITIS, UNSPECIFIED TRIGGER: ICD-10-CM

## 2020-08-20 DIAGNOSIS — I10 ESSENTIAL HYPERTENSION: ICD-10-CM

## 2020-08-20 DIAGNOSIS — Z00.00 MEDICARE ANNUAL WELLNESS VISIT, SUBSEQUENT: Primary | ICD-10-CM

## 2020-08-20 DIAGNOSIS — E11.69 TYPE 2 DIABETES MELLITUS WITH OTHER SPECIFIED COMPLICATION, WITHOUT LONG-TERM CURRENT USE OF INSULIN (HCC): ICD-10-CM

## 2020-08-20 PROCEDURE — 99213 OFFICE O/P EST LOW 20 MIN: CPT | Performed by: FAMILY MEDICINE

## 2020-08-20 PROCEDURE — G0439 PPPS, SUBSEQ VISIT: HCPCS | Performed by: FAMILY MEDICINE

## 2020-08-20 NOTE — PROGRESS NOTES
The ABCs of the Annual Wellness Visit  Subsequent Medicare Wellness Visit    Chief Complaint   Patient presents with   • Medicare Wellness-subsequent       Subjective   History of Present Illness:  Deanne Upton is a 75 y.o. female who presents for a Subsequent Medicare Wellness Visit.    HEALTH RISK ASSESSMENT    Recent Hospitalizations:  No hospitalization(s) within the last year.    Current Medical Providers:  Patient Care Team:  Sangeetha Urbina MD as PCP - General (Family Medicine)  Jeffrey Kent MD as PCP - Claims Attributed    Smoking Status:  Social History     Tobacco Use   Smoking Status Former Smoker   Smokeless Tobacco Never Used   Tobacco Comment    she smoked 1 pack in her life       Alcohol Consumption:  Social History     Substance and Sexual Activity   Alcohol Use Yes    Comment: social drinker       Depression Screen:   PHQ-2/PHQ-9 Depression Screening 8/20/2020   Little interest or pleasure in doing things 0   Feeling down, depressed, or hopeless 1   Trouble falling or staying asleep, or sleeping too much -   Feeling tired or having little energy -   Poor appetite or overeating -   Feeling bad about yourself - or that you are a failure or have let yourself or your family down -   Trouble concentrating on things, such as reading the newspaper or watching television -   Moving or speaking so slowly that other people could have noticed. Or the opposite - being so fidgety or restless that you have been moving around a lot more than usual -   Thoughts that you would be better off dead, or of hurting yourself in some way -   Total Score 1   If you checked off any problems, how difficult have these problems made it for you to do your work, take care of things at home, or get along with other people? -       Fall Risk Screen:  STEADI Fall Risk Assessment was completed, and patient is at MODERATE risk for falls. Assessment completed on:8/20/2020    Health Habits and Functional and Cognitive  Screening:  Functional & Cognitive Status 8/20/2020   Do you have difficulty preparing food and eating? No   Do you have difficulty bathing yourself, getting dressed or grooming yourself? No   Do you have difficulty using the toilet? No   Do you have difficulty moving around from place to place? No   Do you have trouble with steps or getting out of a bed or a chair? Yes   Current Diet Well Balanced Diet   Dental Exam Not up to date   Eye Exam Up to date   Exercise (times per week) 1 times per week   Current Exercise Activities Include Walking   Do you need help using the phone?  No   Are you deaf or do you have serious difficulty hearing?  Yes   Do you need help with transportation? Yes   Do you need help shopping? No   Do you need help preparing meals?  No   Do you need help with housework?  No   Do you need help with laundry? No   Do you need help taking your medications? No   Do you need help managing money? No   Do you ever drive or ride in a car without wearing a seat belt? No   Have you felt unusual stress, anger or loneliness in the last month? Yes   Who do you live with? Alone   If you need help, do you have trouble finding someone available to you? Yes   Have you been bothered in the last four weeks by sexual problems? No   Do you have difficulty concentrating, remembering or making decisions? Yes         Does the patient have evidence of cognitive impairment? Yes    Asprin use counseling:Does not need ASA (and currently is not on it)    Age-appropriate Screening Schedule:  Refer to the list below for future screening recommendations based on patient's age, sex and/or medical conditions. Orders for these recommended tests are listed in the plan section. The patient has been provided with a written plan.    Health Maintenance   Topic Date Due   • ZOSTER VACCINE (2 of 3) 09/26/2012   • MAMMOGRAM  04/20/2019   • INFLUENZA VACCINE  08/01/2020   • COLONOSCOPY  01/01/2021   • URINE MICROALBUMIN  02/03/2021   •  DIABETIC FOOT EXAM  02/06/2021   • HEMOGLOBIN A1C  02/17/2021   • DIABETIC EYE EXAM  08/12/2021   • LIPID PANEL  08/17/2021   • TDAP/TD VACCINES (4 - Td) 08/06/2029          The following portions of the patient's history were reviewed and updated as appropriate: allergies, current medications, past family history, past medical history, past social history, past surgical history and problem list.    Outpatient Medications Prior to Visit   Medication Sig Dispense Refill   • acetaminophen (TYLENOL) 650 MG 8 hr tablet Take 650 mg by mouth 3 (Three) Times a Day.     • albuterol (PROVENTIL HFA;VENTOLIN HFA) 108 (90 Base) MCG/ACT inhaler Inhale 2 puffs Every 4 (Four) Hours As Needed for Wheezing. 1 inhaler 11   • B Complex-C-Folic Acid (B COMPLEX + C TR) tablet controlled-release Take by mouth.     • Cholecalciferol (VITAMIN D) 2000 UNITS capsule Take 4,000 Units by mouth.     • diphenhydrAMINE-acetaminophen (TYLENOL PM)  MG tablet per tablet Take 1 tablet by mouth At Night As Needed for Sleep.     • lisinopril-hydrochlorothiazide (PRINZIDE,ZESTORETIC) 10-12.5 MG per tablet TAKE 1 TABLET BY MOUTH  DAILY 90 tablet 2   • melatonin 5 MG tablet tablet Take 5 mg by mouth Every Night.     • metFORMIN (GLUCOPHAGE) 500 MG tablet TAKE 2 TABLETS BY MOUTH TWO TIMES DAILY 360 tablet 2   • montelukast (SINGULAIR) 10 MG tablet Take 1 tablet by mouth Every Night. 90 tablet 1   • Multiple Vitamin (MULTI VITAMIN DAILY PO) Take by mouth.     • Omega-3 Fatty Acids (FISH OIL) 600 MG capsule Take 350 mg by mouth.     • simvastatin (ZOCOR) 10 MG tablet TAKE 1 TABLET BY MOUTH  EVERY NIGHT 90 tablet 1   • SYMBICORT 160-4.5 MCG/ACT inhaler USE 2 PUFFS TWO TIMES DAILY 30.6 g 3   • benzonatate (TESSALON) 200 MG capsule Take 1 capsule by mouth 3 (Three) Times a Day As Needed for Cough. 30 capsule 0   • Cetirizine HCl 10 MG capsule Take 10 mg by mouth 2 (Two) Times a Day.     • mometasone (NASONEX) 50 MCG/ACT nasal spray 2 sprays into the  nostril(s) as directed by provider Daily.     • Peak Flow Meter device Use to assess breathing 1 each 0   • Spacer/Aero Chamber Mouthpiece misc Use with inhalers for every breathing treatment 1 each 1     No facility-administered medications prior to visit.        Patient Active Problem List   Diagnosis   • Atopic rhinitis   • Anxiety   • Asthma   • Bunion   • Depression   • Functional murmur   • Headache   • Bilateral hearing loss   • Hypertension   • Osteoarthritis of knee   • Pure hyperglyceridemia   • Skin neoplasm   • Type 2 diabetes mellitus (CMS/HCC)   • Vitamin D deficiency   • Vaginal prolapse   • Morbid obesity (CMS/HCC)   • Degenerative disc disease, cervical   • Limited joint range of motion   • Chronic neck pain   • Cervical spondylosis without myelopathy   • Occipital neuralgia of left side       Advanced Care Planning:  ACP discussion was held with the patient during this visit. Patient does not have an advance directive, information provided.    Review of Systems   Constitutional: Negative for activity change, appetite change, diaphoresis and unexpected weight change.   HENT: Positive for congestion.    Respiratory: Negative for shortness of breath.    Cardiovascular: Negative for chest pain and leg swelling.   Gastrointestinal: Negative for blood in stool, constipation and diarrhea.   Endocrine: Negative for polydipsia and polyuria.   Skin: Negative for wound.   Neurological: Negative for light-headedness.       Compared to one year ago, the patient feels her physical health is the same.  Compared to one year ago, the patient feels her mental health is the same.    Reviewed chart for potential of high risk medication in the elderly: yes  Reviewed chart for potential of harmful drug interactions in the elderly:yes    Objective         Vitals:    08/20/20 1038   BP: 136/66   BP Location: Left arm   Patient Position: Sitting   Cuff Size: Adult   Pulse: 84   Resp: 16   Temp: 96.9 °F (36.1 °C)   TempSrc:  "Temporal   SpO2: 98%   Weight: 88.5 kg (195 lb 1.6 oz)   Height: 157.5 cm (62\")   PainSc: 0-No pain       Body mass index is 35.68 kg/m².  Discussed the patient's BMI with her. The BMI is above average; BMI management plan is completed.    Physical Exam   Constitutional: She is oriented to person, place, and time. She appears well-nourished. No distress.   HENT:   Right Ear: External ear normal.   Left Ear: External ear normal.   Nose: Nose normal.   Mouth/Throat: Oropharynx is clear and moist. No oropharyngeal exudate.   TMs are clear, canals are clear/minimal cerumen.    Eyes: Conjunctivae and EOM are normal. Right eye exhibits no discharge. Left eye exhibits no discharge. No scleral icterus.   Neck: Neck supple. No thyromegaly present.   Cardiovascular: Normal rate, regular rhythm, normal heart sounds and intact distal pulses. Exam reveals no gallop and no friction rub.   No murmur heard.  Negative for carotid bruit bilaterally.    Pulmonary/Chest: Effort normal and breath sounds normal. No respiratory distress. She has no wheezes. She has no rales. She exhibits no tenderness.   Abdominal: Soft. Bowel sounds are normal. She exhibits no distension and no mass. There is no tenderness. There is no guarding.   Musculoskeletal: She exhibits no edema or deformity.   Lymphadenopathy:     She has no cervical adenopathy.   Neurological: She is alert and oriented to person, place, and time. She exhibits normal muscle tone. Coordination normal.   Skin: Skin is warm and dry.   Psychiatric: She has a normal mood and affect. Her behavior is normal.   Vitals reviewed.      Lab Results   Component Value Date     (H) 08/17/2020    CHLPL 138 08/17/2020    TRIG 226 (H) 08/17/2020    HDL 41 08/17/2020    LDL 52 08/17/2020    VLDL 45.2 08/17/2020    HGBA1C 6.10 (H) 08/17/2020        Assessment/Plan   Medicare Risks and Personalized Health Plan  CMS Preventative Services Quick Reference  Advance Directive Discussion  Breast " Cancer/Mammogram Screening  Colon Cancer Screening  Dementia/Memory   Immunizations Discussed/Encouraged (specific immunizations; Influenza )  Obesity/Overweight     The above risks/problems have been discussed with the patient.  Pertinent information has been shared with the patient in the After Visit Summary.  Follow up plans and orders are seen below in the Assessment/Plan Section.    Diagnoses and all orders for this visit:    1. Medicare annual wellness visit, subsequent (Primary)    2. Type 2 diabetes mellitus with other specified complication, without long-term current use of insulin (CMS/[facility])    3. Essential hypertension    4. Seasonal allergic rhinitis, unspecified trigger      Follow Up:  No follow-ups on file.     An After Visit Summary and PPPS were given to the patient.       Knee pain  Over her time on injections, having pain, harder to work. She is going next week for the injection.    DM  Improved she had to change her diet because of food availability.     Sciatica   Was supposed to go for her PT and said she does not have any coverage for this. She has had before and has not changed her pain.

## 2020-08-27 ENCOUNTER — OFFICE VISIT (OUTPATIENT)
Dept: ORTHOPEDIC SURGERY | Facility: CLINIC | Age: 75
End: 2020-08-27

## 2020-08-27 VITALS — TEMPERATURE: 97.3 F | BODY MASS INDEX: 35.88 KG/M2 | HEIGHT: 62 IN | WEIGHT: 195 LBS

## 2020-08-27 DIAGNOSIS — M17.0 PRIMARY OSTEOARTHRITIS OF BOTH KNEES: Primary | ICD-10-CM

## 2020-08-27 PROCEDURE — 99212 OFFICE O/P EST SF 10 MIN: CPT | Performed by: NURSE PRACTITIONER

## 2020-08-27 NOTE — PROGRESS NOTES
Patient: Deanne Upton  YOB: 1945 75 y.o. female  Medical Record Number: [id number]    Chief Complaints:   Chief Complaint   Patient presents with   • Right Knee - Pain, Follow-up   • Left Knee - Follow-up, Pain       History of Present Illness:Deanne Upton is a 75 y.o. female who presents with complaints of bilateral knee pain.  Patient reports she has had increased pain over the last 3 to 4 weeks.  She denies any injury.  Describes the bilateral knee pain as a moderate constant ache worse with standing walking lying in bed better with change in position    Allergies:   Allergies   Allergen Reactions   • Crocus Anaphylaxis   • Ibuprofen Swelling     Lips swell   • Naproxen GI Intolerance     Can't remember    • Nsaids GI Intolerance     Was having some kidney problems   • Tramadol GI Intolerance     Visual complaint (light show with eyes closes), joint pain, drowsy,bloody stool with diarrhea!!!!!!!       Medications:   Current Outpatient Medications   Medication Sig Dispense Refill   • acetaminophen (TYLENOL) 650 MG 8 hr tablet Take 650 mg by mouth 3 (Three) Times a Day.     • albuterol (PROVENTIL HFA;VENTOLIN HFA) 108 (90 Base) MCG/ACT inhaler Inhale 2 puffs Every 4 (Four) Hours As Needed for Wheezing. 1 inhaler 11   • B Complex-C-Folic Acid (B COMPLEX + C TR) tablet controlled-release Take by mouth.     • benzonatate (TESSALON) 200 MG capsule Take 1 capsule by mouth 3 (Three) Times a Day As Needed for Cough. 30 capsule 0   • Cetirizine HCl 10 MG capsule Take 10 mg by mouth 2 (Two) Times a Day.     • Cholecalciferol (VITAMIN D) 2000 UNITS capsule Take 4,000 Units by mouth.     • diphenhydrAMINE-acetaminophen (TYLENOL PM)  MG tablet per tablet Take 1 tablet by mouth At Night As Needed for Sleep.     • lisinopril-hydrochlorothiazide (PRINZIDE,ZESTORETIC) 10-12.5 MG per tablet TAKE 1 TABLET BY MOUTH  DAILY 90 tablet 2   • melatonin 5 MG tablet tablet Take 5 mg by mouth Every Night.     •  "metFORMIN (GLUCOPHAGE) 500 MG tablet TAKE 2 TABLETS BY MOUTH TWO TIMES DAILY 360 tablet 2   • mometasone (NASONEX) 50 MCG/ACT nasal spray 2 sprays into the nostril(s) as directed by provider Daily.     • montelukast (SINGULAIR) 10 MG tablet Take 1 tablet by mouth Every Night. 90 tablet 1   • Multiple Vitamin (MULTI VITAMIN DAILY PO) Take by mouth.     • Omega-3 Fatty Acids (FISH OIL) 600 MG capsule Take 350 mg by mouth.     • Peak Flow Meter device Use to assess breathing 1 each 0   • simvastatin (ZOCOR) 10 MG tablet TAKE 1 TABLET BY MOUTH  EVERY NIGHT 90 tablet 1   • Spacer/Aero Chamber Mouthpiece misc Use with inhalers for every breathing treatment 1 each 1   • SYMBICORT 160-4.5 MCG/ACT inhaler USE 2 PUFFS TWO TIMES DAILY 30.6 g 3     No current facility-administered medications for this visit.          The following portions of the patient's history were reviewed and updated as appropriate: allergies, current medications, past family history, past medical history, past social history, past surgical history and problem list.    Review of Systems:   A 14 point review of systems was performed. All systems negative except pertinent positives/negative listed in HPI above    Physical Exam:   Vitals:    08/27/20 1034   Temp: 97.3 °F (36.3 °C)   TempSrc: Temporal   Weight: 88.5 kg (195 lb)   Height: 157.5 cm (62.01\")   PainSc:   8   PainLoc: Knee       General: A and O x 3, ASA, NAD    SCLERA:    Normal    DENTITION:   Normal  Skin clear no unusual lesions noted  Bilateral knees patient has no appreciable effusion limited range of motion secondary to pain calf is soft and nontender    Radiology:  Xrays previous x-rays bilateral knees were reviewed show significant arthritic changes    Assessment/Plan:  Osteoarthritis bilateral knees    Patient discussed treatment options, she would like to proceed with bilateral knee cortisone injections.  Prior to injections risks were discussed including pain infection.  Patient " verbalized understanding would like to proceed with injections she will continue with physical therapy exercises and we will see her back as needed

## 2020-10-12 DIAGNOSIS — E11.8 DIABETIC COMPLICATION (HCC): ICD-10-CM

## 2020-10-13 RX ORDER — MONTELUKAST SODIUM 10 MG/1
TABLET ORAL
Qty: 90 TABLET | Refills: 3 | Status: SHIPPED | OUTPATIENT
Start: 2020-10-13 | End: 2021-10-19

## 2020-10-13 RX ORDER — BUDESONIDE AND FORMOTEROL FUMARATE DIHYDRATE 160; 4.5 UG/1; UG/1
AEROSOL RESPIRATORY (INHALATION)
Qty: 30.6 G | Refills: 3 | Status: SHIPPED | OUTPATIENT
Start: 2020-10-13 | End: 2021-10-19

## 2020-11-19 RX ORDER — LISINOPRIL AND HYDROCHLOROTHIAZIDE 12.5; 1 MG/1; MG/1
1 TABLET ORAL DAILY
Qty: 90 TABLET | Refills: 3 | Status: SHIPPED | OUTPATIENT
Start: 2020-11-19 | End: 2021-12-20 | Stop reason: SDUPTHER

## 2020-12-04 ENCOUNTER — CLINICAL SUPPORT (OUTPATIENT)
Dept: ORTHOPEDIC SURGERY | Facility: CLINIC | Age: 75
End: 2020-12-04

## 2020-12-04 VITALS — BODY MASS INDEX: 35.88 KG/M2 | TEMPERATURE: 98.2 F | HEIGHT: 62 IN | WEIGHT: 195 LBS

## 2020-12-04 DIAGNOSIS — M17.0 PRIMARY OSTEOARTHRITIS OF BOTH KNEES: Primary | ICD-10-CM

## 2020-12-04 PROCEDURE — 99213 OFFICE O/P EST LOW 20 MIN: CPT | Performed by: NURSE PRACTITIONER

## 2020-12-04 PROCEDURE — 20610 DRAIN/INJ JOINT/BURSA W/O US: CPT | Performed by: NURSE PRACTITIONER

## 2020-12-04 RX ORDER — METHYLPREDNISOLONE ACETATE 80 MG/ML
80 INJECTION, SUSPENSION INTRA-ARTICULAR; INTRALESIONAL; INTRAMUSCULAR; SOFT TISSUE
Status: COMPLETED | OUTPATIENT
Start: 2020-12-04 | End: 2020-12-04

## 2020-12-04 RX ORDER — FOLIC ACID/MULTIVIT,IRON,MINER 0.4MG-18MG
TABLET ORAL DAILY
COMMUNITY

## 2020-12-04 RX ADMIN — METHYLPREDNISOLONE ACETATE 80 MG: 80 INJECTION, SUSPENSION INTRA-ARTICULAR; INTRALESIONAL; INTRAMUSCULAR; SOFT TISSUE at 09:46

## 2020-12-04 NOTE — PROGRESS NOTES
Patient: Deanne Upton  YOB: 1945 75 y.o. female  Medical Record Number: [id number]    Chief Complaints: Bilateral knee pain    History of Present Illness:Deanne Upton is a 75 y.o. female who presents with complaints of increased bilateral knee pain.  She denies any injury.  Has had increased moderate constant dull aching type pain over the last couple of weeks.  She reports ice and rest only make it slightly better walking make it worse.    Allergies:   Allergies   Allergen Reactions   • Crocus Anaphylaxis   • Ibuprofen Swelling     Lips swell   • Naproxen GI Intolerance     Can't remember    • Nsaids GI Intolerance     Was having some kidney problems   • Tramadol GI Intolerance     Visual complaint (light show with eyes closes), joint pain, drowsy,bloody stool with diarrhea!!!!!!!       Medications:   Current Outpatient Medications   Medication Sig Dispense Refill   • acetaminophen (TYLENOL) 650 MG 8 hr tablet Take 650 mg by mouth 3 (Three) Times a Day.     • albuterol (PROVENTIL HFA;VENTOLIN HFA) 108 (90 Base) MCG/ACT inhaler Inhale 2 puffs Every 4 (Four) Hours As Needed for Wheezing. 1 inhaler 11   • B Complex-C-Folic Acid (B COMPLEX + C TR) tablet controlled-release Take by mouth.     • benzonatate (TESSALON) 200 MG capsule Take 1 capsule by mouth 3 (Three) Times a Day As Needed for Cough. 30 capsule 0   • Cetirizine HCl 10 MG capsule Take 10 mg by mouth 2 (Two) Times a Day.     • Cholecalciferol (VITAMIN D) 2000 UNITS capsule Take 4,000 Units by mouth.     • diphenhydrAMINE-acetaminophen (TYLENOL PM)  MG tablet per tablet Take 1 tablet by mouth At Night As Needed for Sleep.     • lisinopril-hydrochlorothiazide (PRINZIDE,ZESTORETIC) 10-12.5 MG per tablet TAKE 1 TABLET BY MOUTH  DAILY 90 tablet 3   • melatonin 5 MG tablet tablet Take 5 mg by mouth Every Night.     • metFORMIN (GLUCOPHAGE) 500 MG tablet TAKE 2 TABLETS BY MOUTH TWO TIMES DAILY 360 tablet 3   • mometasone (NASONEX) 50  "MCG/ACT nasal spray 2 sprays into the nostril(s) as directed by provider Daily.     • montelukast (SINGULAIR) 10 MG tablet TAKE 1 TABLET BY MOUTH AT  NIGHT 90 tablet 3   • Multiple Vitamin (MULTI VITAMIN DAILY PO) Take by mouth.     • Omega-3 Fatty Acids (FISH OIL) 600 MG capsule Take 350 mg by mouth.     • Peak Flow Meter device Use to assess breathing 1 each 0   • simvastatin (ZOCOR) 10 MG tablet TAKE 1 TABLET BY MOUTH  EVERY NIGHT 90 tablet 1   • Spacer/Aero Chamber Mouthpiece misc Use with inhalers for every breathing treatment 1 each 1   • Symbicort 160-4.5 MCG/ACT inhaler USE 2 PUFFS BY MOUTH TWO  TIMES DAILY 30.6 g 3     No current facility-administered medications for this visit.          The following portions of the patient's history were reviewed and updated as appropriate: allergies, current medications, past family history, past medical history, past social history, past surgical history and problem list.    Review of Systems:   A 14 point review of systems was performed. All systems negative except pertinent positives/negative listed in HPI above    Physical Exam:   Vitals:    12/04/20 1429   Temp: 98.2 °F (36.8 °C)   Weight: 88.5 kg (195 lb)   Height: 157.5 cm (62\")   PainSc:   6       General: A and O x 3, ASA, NAD    SCLERA:    Normal    DENTITION:   Normal  Skin clear no unusual lesions noted  Bilateral knees patient has no appreciable effusion range of motion secondary to pain calf soft and nontender    Radiology:  Xrays 3views (ap,lateral, sunrise) osteoarthritis bilateral knees were seen on previous x-rays    Assessment/Plan:  Osteoarthritis bilateral knees    Patient discussed treatment options, she would like to proceed with bilateral knee cortisone injections, she will continue with physical therapy exercises we will see her back as needed    Large Joint Arthrocentesis: R knee  Date/Time: 12/4/2020 9:46 AM  Consent given by: patient  Site marked: site marked  Timeout: Immediately prior to " procedure a time out was called to verify the correct patient, procedure, equipment, support staff and site/side marked as required   Supporting Documentation  Indications: pain   Procedure Details  Location: knee - R knee  Preparation: Patient was prepped and draped in the usual sterile fashion  Needle gauge: 21g.  Approach: lateral  Medications administered: 2 mL lidocaine (cardiac); 80 mg methylPREDNISolone acetate 80 MG/ML  Patient tolerance: patient tolerated the procedure well with no immediate complications    Large Joint Arthrocentesis: L knee  Date/Time: 12/4/2020 9:46 AM  Consent given by: patient  Site marked: site marked  Timeout: Immediately prior to procedure a time out was called to verify the correct patient, procedure, equipment, support staff and site/side marked as required   Supporting Documentation  Indications: pain   Procedure Details  Location: knee - L knee  Preparation: Patient was prepped and draped in the usual sterile fashion  Needle gauge: 21g.  Approach: lateral  Medications administered: 2 mL lidocaine (cardiac); 80 mg methylPREDNISolone acetate 80 MG/ML  Patient tolerance: patient tolerated the procedure well with no immediate complications

## 2021-01-21 ENCOUNTER — OFFICE VISIT (OUTPATIENT)
Dept: FAMILY MEDICINE CLINIC | Facility: CLINIC | Age: 76
End: 2021-01-21

## 2021-01-21 VITALS
BODY MASS INDEX: 36.31 KG/M2 | WEIGHT: 197.3 LBS | SYSTOLIC BLOOD PRESSURE: 124 MMHG | TEMPERATURE: 96.8 F | HEART RATE: 94 BPM | RESPIRATION RATE: 17 BRPM | OXYGEN SATURATION: 98 % | HEIGHT: 62 IN | DIASTOLIC BLOOD PRESSURE: 74 MMHG

## 2021-01-21 DIAGNOSIS — Z12.31 ENCOUNTER FOR SCREENING MAMMOGRAM FOR MALIGNANT NEOPLASM OF BREAST: ICD-10-CM

## 2021-01-21 DIAGNOSIS — Z98.890 HISTORY OF LEFT BREAST BIOPSY: ICD-10-CM

## 2021-01-21 DIAGNOSIS — R22.2 MASS IN CHEST: ICD-10-CM

## 2021-01-21 DIAGNOSIS — J45.21 MILD INTERMITTENT ASTHMA WITH ACUTE EXACERBATION: Primary | ICD-10-CM

## 2021-01-21 DIAGNOSIS — E11.69 TYPE 2 DIABETES MELLITUS WITH OTHER SPECIFIED COMPLICATION, WITHOUT LONG-TERM CURRENT USE OF INSULIN (HCC): ICD-10-CM

## 2021-01-21 DIAGNOSIS — N64.4 BREAST PAIN: ICD-10-CM

## 2021-01-21 PROBLEM — H40.013 OPEN ANGLE WITH BORDERLINE FINDINGS, LOW RISK, BILATERAL: Status: ACTIVE | Noted: 2020-08-12

## 2021-01-21 PROCEDURE — G0008 ADMIN INFLUENZA VIRUS VAC: HCPCS | Performed by: FAMILY MEDICINE

## 2021-01-21 PROCEDURE — 90694 VACC AIIV4 NO PRSRV 0.5ML IM: CPT | Performed by: FAMILY MEDICINE

## 2021-01-21 PROCEDURE — 99214 OFFICE O/P EST MOD 30 MIN: CPT | Performed by: FAMILY MEDICINE

## 2021-01-22 LAB
BUN SERPL-MCNC: 22 MG/DL (ref 8–23)
BUN/CREAT SERPL: 20 (ref 7–25)
CALCIUM SERPL-MCNC: 9.8 MG/DL (ref 8.6–10.5)
CHLORIDE SERPL-SCNC: 103 MMOL/L (ref 98–107)
CO2 SERPL-SCNC: 26.5 MMOL/L (ref 22–29)
CREAT SERPL-MCNC: 1.1 MG/DL (ref 0.57–1)
ERYTHROCYTE [DISTWIDTH] IN BLOOD BY AUTOMATED COUNT: 12.7 % (ref 12.3–15.4)
GLUCOSE SERPL-MCNC: 125 MG/DL (ref 65–99)
HBA1C MFR BLD: 6.3 % (ref 4.8–5.6)
HCT VFR BLD AUTO: 41 % (ref 34–46.6)
HGB BLD-MCNC: 13.7 G/DL (ref 12–15.9)
MCH RBC QN AUTO: 29.8 PG (ref 26.6–33)
MCHC RBC AUTO-ENTMCNC: 33.4 G/DL (ref 31.5–35.7)
MCV RBC AUTO: 89.1 FL (ref 79–97)
PLATELET # BLD AUTO: 264 10*3/MM3 (ref 140–450)
POTASSIUM SERPL-SCNC: 4.3 MMOL/L (ref 3.5–5.2)
RBC # BLD AUTO: 4.6 10*6/MM3 (ref 3.77–5.28)
SODIUM SERPL-SCNC: 141 MMOL/L (ref 136–145)
WBC # BLD AUTO: 7.82 10*3/MM3 (ref 3.4–10.8)

## 2021-01-25 RX ORDER — SIMVASTATIN 10 MG
TABLET ORAL
Qty: 90 TABLET | Refills: 3 | Status: SHIPPED | OUTPATIENT
Start: 2021-01-25 | End: 2022-02-18

## 2021-02-09 ENCOUNTER — OFFICE VISIT (OUTPATIENT)
Dept: ORTHOPEDIC SURGERY | Facility: CLINIC | Age: 76
End: 2021-02-09

## 2021-02-09 VITALS — HEIGHT: 64 IN | TEMPERATURE: 97.2 F | WEIGHT: 190 LBS | BODY MASS INDEX: 32.44 KG/M2

## 2021-02-09 DIAGNOSIS — E11.69 TYPE 2 DIABETES MELLITUS WITH OTHER SPECIFIED COMPLICATION, WITHOUT LONG-TERM CURRENT USE OF INSULIN (HCC): Primary | ICD-10-CM

## 2021-02-09 DIAGNOSIS — E78.1 PURE HYPERGLYCERIDEMIA: ICD-10-CM

## 2021-02-09 DIAGNOSIS — M48.062 SPINAL STENOSIS OF LUMBAR REGION WITH NEUROGENIC CLAUDICATION: ICD-10-CM

## 2021-02-09 DIAGNOSIS — E11.69 TYPE 2 DIABETES MELLITUS WITH OTHER SPECIFIED COMPLICATION, WITHOUT LONG-TERM CURRENT USE OF INSULIN (HCC): ICD-10-CM

## 2021-02-09 DIAGNOSIS — M54.50 LOW BACK PAIN, UNSPECIFIED BACK PAIN LATERALITY, UNSPECIFIED CHRONICITY, UNSPECIFIED WHETHER SCIATICA PRESENT: Primary | ICD-10-CM

## 2021-02-09 DIAGNOSIS — M47.22 CERVICAL SPONDYLOSIS WITH RADICULOPATHY: ICD-10-CM

## 2021-02-09 PROCEDURE — 99214 OFFICE O/P EST MOD 30 MIN: CPT | Performed by: ORTHOPAEDIC SURGERY

## 2021-02-09 PROCEDURE — 72100 X-RAY EXAM L-S SPINE 2/3 VWS: CPT | Performed by: ORTHOPAEDIC SURGERY
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
## 2021-02-10 ENCOUNTER — APPOINTMENT (OUTPATIENT)
Dept: ULTRASOUND IMAGING | Facility: HOSPITAL | Age: 76
End: 2021-02-10

## 2021-02-10 ENCOUNTER — HOSPITAL ENCOUNTER (OUTPATIENT)
Dept: ULTRASOUND IMAGING | Facility: HOSPITAL | Age: 76
Discharge: HOME | End: 2021-02-10

## 2021-02-10 ENCOUNTER — HOSPITAL ENCOUNTER (OUTPATIENT)
Dept: MAMMOGRAPHY | Facility: HOSPITAL | Age: 76
Discharge: HOME | End: 2021-02-10

## 2021-02-10 DIAGNOSIS — R22.2 MASS IN CHEST: ICD-10-CM

## 2021-02-10 DIAGNOSIS — Z98.890 HISTORY OF LEFT BREAST BIOPSY: ICD-10-CM

## 2021-02-10 DIAGNOSIS — N64.4 BREAST PAIN: ICD-10-CM

## 2021-02-10 PROCEDURE — 77066 DX MAMMO INCL CAD BI: CPT

## 2021-02-10 PROCEDURE — G0279 TOMOSYNTHESIS, MAMMO: HCPCS

## 2021-02-10 PROCEDURE — 76642 ULTRASOUND BREAST LIMITED: CPT

## 2021-02-10 PROCEDURE — 76604 US EXAM CHEST: CPT

## 2021-02-16 ENCOUNTER — TELEPHONE (OUTPATIENT)
Dept: FAMILY MEDICINE CLINIC | Facility: CLINIC | Age: 76
End: 2021-02-16

## 2021-02-16 DIAGNOSIS — R59.1 LYMPHADENOPATHY: Primary | ICD-10-CM

## 2021-02-16 NOTE — TELEPHONE ENCOUNTER
Called pt to discuss the results of her recent imaging. Hope to talk to her today. If we have not talked by the afternoon I asked her to call the office.

## 2021-03-02 DIAGNOSIS — Z23 IMMUNIZATION DUE: ICD-10-CM

## 2021-03-05 ENCOUNTER — CLINICAL SUPPORT (OUTPATIENT)
Dept: ORTHOPEDIC SURGERY | Facility: CLINIC | Age: 76
End: 2021-03-05

## 2021-03-05 VITALS — HEIGHT: 64 IN | TEMPERATURE: 98 F | BODY MASS INDEX: 31.76 KG/M2 | WEIGHT: 186 LBS

## 2021-03-05 DIAGNOSIS — M17.0 PRIMARY OSTEOARTHRITIS OF BOTH KNEES: Primary | ICD-10-CM

## 2021-03-05 PROCEDURE — 20610 DRAIN/INJ JOINT/BURSA W/O US: CPT | Performed by: NURSE PRACTITIONER

## 2021-03-05 PROCEDURE — 99213 OFFICE O/P EST LOW 20 MIN: CPT | Performed by: NURSE PRACTITIONER

## 2021-03-05 RX ORDER — BIMATOPROST 0.01 %
1 DROPS OPHTHALMIC (EYE) NIGHTLY
COMMUNITY
Start: 2021-02-27

## 2021-03-05 RX ORDER — METHYLPREDNISOLONE ACETATE 80 MG/ML
80 INJECTION, SUSPENSION INTRA-ARTICULAR; INTRALESIONAL; INTRAMUSCULAR; SOFT TISSUE
Status: COMPLETED | OUTPATIENT
Start: 2021-03-05 | End: 2021-03-05

## 2021-03-05 RX ORDER — TRIAMCINOLONE ACETONIDE 1 MG/G
1 CREAM TOPICAL 2 TIMES DAILY
COMMUNITY
Start: 2021-02-12

## 2021-03-05 RX ADMIN — METHYLPREDNISOLONE ACETATE 80 MG: 80 INJECTION, SUSPENSION INTRA-ARTICULAR; INTRALESIONAL; INTRAMUSCULAR; SOFT TISSUE at 07:39

## 2021-03-05 NOTE — PROGRESS NOTES
Patient: Deanne Upton  YOB: 1945 75 y.o. female  Medical Record Number: [id number]    Chief Complaints:   Chief Complaint   Patient presents with   • Left Knee - Follow-up, Pain   • Right Knee - Follow-up, Pain       History of Present Illness:Deanne Upton is a 75 y.o. female who presents with complaints of bilateral knee pain.  She has known history of osteoarthritis, denies any recent injury.  Describes the bilateral knee pain as a mild to moderate constant ache worse with walking better with rest    Allergies:   Allergies   Allergen Reactions   • Crocus Anaphylaxis   • Opium Headache   • Ibuprofen Swelling     Lips swell   • Naproxen GI Intolerance     Can't remember    • Nsaids GI Intolerance     Was having some kidney problems   • Tramadol GI Intolerance     Visual complaint (light show with eyes closes), joint pain, drowsy,bloody stool with diarrhea!!!!!!!       Medications:   Current Outpatient Medications   Medication Sig Dispense Refill   • acetaminophen (TYLENOL) 650 MG 8 hr tablet Take 650 mg by mouth 3 (Three) Times a Day.     • albuterol (PROVENTIL HFA;VENTOLIN HFA) 108 (90 Base) MCG/ACT inhaler Inhale 2 puffs Every 4 (Four) Hours As Needed for Wheezing. 1 inhaler 11   • B Complex-C-Folic Acid (B COMPLEX + C TR) tablet controlled-release Take by mouth.     • benzonatate (TESSALON) 200 MG capsule Take 1 capsule by mouth 3 (Three) Times a Day As Needed for Cough. 30 capsule 0   • Cetirizine HCl 10 MG capsule Take 10 mg by mouth 2 (Two) Times a Day.     • Cholecalciferol (VITAMIN D) 2000 UNITS capsule Take 5,000 Units by mouth.     • diphenhydrAMINE-acetaminophen (TYLENOL PM)  MG tablet per tablet Take 1 tablet by mouth At Night As Needed for Sleep.     • Krill Oil 350 MG capsule Take  by mouth.     • lisinopril-hydrochlorothiazide (PRINZIDE,ZESTORETIC) 10-12.5 MG per tablet TAKE 1 TABLET BY MOUTH  DAILY 90 tablet 3   • Lumigan 0.01 % ophthalmic drops      • melatonin 5 MG  "tablet tablet Take 5 mg by mouth Every Night.     • metFORMIN (GLUCOPHAGE) 500 MG tablet TAKE 2 TABLETS BY MOUTH TWO TIMES DAILY 360 tablet 3   • mometasone (NASONEX) 50 MCG/ACT nasal spray 2 sprays into the nostril(s) as directed by provider Daily.     • montelukast (SINGULAIR) 10 MG tablet TAKE 1 TABLET BY MOUTH AT  NIGHT 90 tablet 3   • Multiple Vitamin (MULTI VITAMIN DAILY PO) Take by mouth.     • Peak Flow Meter device Use to assess breathing 1 each 0   • simvastatin (ZOCOR) 10 MG tablet TAKE 1 TABLET BY MOUTH  NIGHTLY 90 tablet 3   • Spacer/Aero Chamber Mouthpiece misc Use with inhalers for every breathing treatment 1 each 1   • Symbicort 160-4.5 MCG/ACT inhaler USE 2 PUFFS BY MOUTH TWO  TIMES DAILY 30.6 g 3   • triamcinolone (KENALOG) 0.1 % cream        No current facility-administered medications for this visit.          The following portions of the patient's history were reviewed and updated as appropriate: allergies, current medications, past family history, past medical history, past social history, past surgical history and problem list.    Review of Systems:   A 14 point review of systems was performed. All systems negative except pertinent positives/negative listed in HPI above    Physical Exam:   Vitals:    03/05/21 1528   Temp: 98 °F (36.7 °C)   TempSrc: Temporal   Weight: 84.4 kg (186 lb)   Height: 162.6 cm (64\")       General: A and O x 3, ASA, NAD    SCLERA:    Normal    DENTITION:   Normal  Skin clear no unusual lesions noted  Bilateral knees patient has no appreciable effusion limited range of motion secondary to pain    Radiology:  Xrays 3views (ap,lateral, sunrise) previous x-rays bilateral knees were reviewed show significant arthritic changes    Assessment/Plan:  Osteoarthritis bilateral knees    Patient I discussed options, she would like to proceed with bilateral knee cortisone injections, she will continue with physical therapy exercises and I will see her back in 3 months if " needed    Large Joint Arthrocentesis: R knee  Date/Time: 3/5/2021 7:39 AM  Consent given by: patient  Site marked: site marked  Timeout: Immediately prior to procedure a time out was called to verify the correct patient, procedure, equipment, support staff and site/side marked as required   Supporting Documentation  Indications: pain and joint swelling   Procedure Details  Location: knee - R knee  Preparation: Patient was prepped and draped in the usual sterile fashion  Needle gauge: 21 G.  Approach: anterolateral  Medications administered: 80 mg methylPREDNISolone acetate 80 MG/ML; 2 mL lidocaine (cardiac)  Patient tolerance: patient tolerated the procedure well with no immediate complications    Large Joint Arthrocentesis: L knee  Date/Time: 3/5/2021 7:39 AM  Consent given by: patient  Site marked: site marked  Timeout: Immediately prior to procedure a time out was called to verify the correct patient, procedure, equipment, support staff and site/side marked as required   Supporting Documentation  Indications: pain and joint swelling   Procedure Details  Location: knee - L knee  Preparation: Patient was prepped and draped in the usual sterile fashion  Needle gauge: 21 G.  Approach: anterolateral  Medications administered: 2 mL lidocaine (cardiac); 80 mg methylPREDNISolone acetate 80 MG/ML  Patient tolerance: patient tolerated the procedure well with no immediate complications

## 2021-03-11 ENCOUNTER — HOSPITAL ENCOUNTER (OUTPATIENT)
Dept: CT IMAGING | Facility: HOSPITAL | Age: 76
Discharge: HOME | End: 2021-03-11

## 2021-03-11 ENCOUNTER — HOSPITAL ENCOUNTER (OUTPATIENT)
Dept: MRI IMAGING | Facility: HOSPITAL | Age: 76
Discharge: HOME | End: 2021-03-11

## 2021-03-11 DIAGNOSIS — M54.50 LOW BACK PAIN, UNSPECIFIED BACK PAIN LATERALITY, UNSPECIFIED CHRONICITY, UNSPECIFIED WHETHER SCIATICA PRESENT: ICD-10-CM

## 2021-03-11 DIAGNOSIS — R59.1 LYMPHADENOPATHY: ICD-10-CM

## 2021-03-11 LAB — CREAT BLDA-MCNC: 1.2 MG/DL (ref 0.6–1.3)

## 2021-03-11 PROCEDURE — 82565 ASSAY OF CREATININE: CPT

## 2021-03-11 PROCEDURE — 72148 MRI LUMBAR SPINE W/O DYE: CPT

## 2021-03-11 PROCEDURE — 25010000002 IOPAMIDOL 61 % SOLUTION: Performed by: FAMILY MEDICINE

## 2021-03-11 PROCEDURE — 71260 CT THORAX DX C+: CPT

## 2021-03-11 PROCEDURE — 70491 CT SOFT TISSUE NECK W/DYE: CPT

## 2021-03-11 RX ADMIN — IOPAMIDOL 100 ML: 612 INJECTION, SOLUTION INTRAVENOUS at 13:46

## 2021-03-28 ENCOUNTER — IMMUNIZATION (OUTPATIENT)
Dept: VACCINE CLINIC | Facility: HOSPITAL | Age: 76
End: 2021-03-28

## 2021-03-28 PROCEDURE — 0001A: CPT | Performed by: INTERNAL MEDICINE

## 2021-03-28 PROCEDURE — 91300 HC SARSCOV02 VAC 30MCG/0.3ML IM: CPT | Performed by: INTERNAL MEDICINE

## 2021-04-09 ENCOUNTER — OFFICE VISIT (OUTPATIENT)
Dept: FAMILY MEDICINE CLINIC | Facility: CLINIC | Age: 76
End: 2021-04-09

## 2021-04-09 VITALS
TEMPERATURE: 96.8 F | SYSTOLIC BLOOD PRESSURE: 124 MMHG | HEART RATE: 102 BPM | RESPIRATION RATE: 17 BRPM | WEIGHT: 189.2 LBS | DIASTOLIC BLOOD PRESSURE: 62 MMHG | BODY MASS INDEX: 32.3 KG/M2 | OXYGEN SATURATION: 98 % | HEIGHT: 64 IN

## 2021-04-09 DIAGNOSIS — E11.69 TYPE 2 DIABETES MELLITUS WITH OTHER SPECIFIED COMPLICATION, WITHOUT LONG-TERM CURRENT USE OF INSULIN (HCC): Primary | ICD-10-CM

## 2021-04-09 DIAGNOSIS — I10 ESSENTIAL HYPERTENSION: ICD-10-CM

## 2021-04-09 PROBLEM — H40.1190 PRIMARY OPEN ANGLE GLAUCOMA: Status: ACTIVE | Noted: 2021-02-25

## 2021-04-09 PROCEDURE — 99213 OFFICE O/P EST LOW 20 MIN: CPT | Performed by: FAMILY MEDICINE
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
## 2021-04-18 ENCOUNTER — IMMUNIZATION (OUTPATIENT)
Dept: VACCINE CLINIC | Facility: HOSPITAL | Age: 76
End: 2021-04-18

## 2021-04-18 PROCEDURE — 0002A: CPT | Performed by: INTERNAL MEDICINE

## 2021-04-18 PROCEDURE — 91300 HC SARSCOV02 VAC 30MCG/0.3ML IM: CPT | Performed by: INTERNAL MEDICINE

## 2021-05-04 ENCOUNTER — TELEPHONE (OUTPATIENT)
Dept: ORTHOPEDIC SURGERY | Facility: CLINIC | Age: 76
End: 2021-05-04

## 2021-05-04 NOTE — TELEPHONE ENCOUNTER
Would not recommend oral anti-inflammatories since her kidney function test are elevated, would recommend that she try Voltaren gel over-the-counter apply to both knees twice a day, hopefully that will help until we can get another set of injections

## 2021-06-11 ENCOUNTER — CLINICAL SUPPORT (OUTPATIENT)
Dept: ORTHOPEDIC SURGERY | Facility: CLINIC | Age: 76
End: 2021-06-11

## 2021-06-11 VITALS — TEMPERATURE: 98.2 F | WEIGHT: 185 LBS | HEIGHT: 64 IN | BODY MASS INDEX: 31.58 KG/M2

## 2021-06-11 DIAGNOSIS — M17.0 PRIMARY OSTEOARTHRITIS OF BOTH KNEES: ICD-10-CM

## 2021-06-11 DIAGNOSIS — M17.0 BILATERAL PRIMARY OSTEOARTHRITIS OF KNEE: ICD-10-CM

## 2021-06-11 DIAGNOSIS — R52 PAIN: ICD-10-CM

## 2021-06-11 PROCEDURE — 73562 X-RAY EXAM OF KNEE 3: CPT | Performed by: NURSE PRACTITIONER

## 2021-06-11 PROCEDURE — 20610 DRAIN/INJ JOINT/BURSA W/O US: CPT | Performed by: NURSE PRACTITIONER

## 2021-06-11 RX ORDER — LIDOCAINE HYDROCHLORIDE 20 MG/ML
2 INJECTION, SOLUTION EPIDURAL; INFILTRATION; INTRACAUDAL; PERINEURAL
Status: COMPLETED | OUTPATIENT
Start: 2021-06-11 | End: 2021-06-11

## 2021-06-11 RX ORDER — METHYLPREDNISOLONE ACETATE 80 MG/ML
80 INJECTION, SUSPENSION INTRA-ARTICULAR; INTRALESIONAL; INTRAMUSCULAR; SOFT TISSUE
Status: COMPLETED | OUTPATIENT
Start: 2021-06-11 | End: 2021-06-11

## 2021-06-11 RX ADMIN — METHYLPREDNISOLONE ACETATE 80 MG: 80 INJECTION, SUSPENSION INTRA-ARTICULAR; INTRALESIONAL; INTRAMUSCULAR; SOFT TISSUE at 14:26

## 2021-06-11 RX ADMIN — LIDOCAINE HYDROCHLORIDE 2 ML: 20 INJECTION, SOLUTION EPIDURAL; INFILTRATION; INTRACAUDAL; PERINEURAL at 14:26

## 2021-06-11 NOTE — PROGRESS NOTES
6/11/2021    Deanne Upton is here today for worsening knee pain. Pt has undergone injection of the knee in the past with good resolution of symptoms. Pt is requesting a repeat injection.     KNEE Injection Procedure Note:    Large Joint Arthrocentesis: R knee  Date/Time: 6/11/2021 2:26 PM  Consent given by: patient  Site marked: site marked  Timeout: Immediately prior to procedure a time out was called to verify the correct patient, procedure, equipment, support staff and site/side marked as required   Supporting Documentation  Indications: pain and joint swelling   Procedure Details  Location: knee - R knee  Preparation: Patient was prepped and draped in the usual sterile fashion  Needle size: 22 G  Approach: anterolateral  Medications administered: 80 mg methylPREDNISolone acetate 80 MG/ML; 2 mL lidocaine PF 2% 2 %  Patient tolerance: patient tolerated the procedure well with no immediate complications    Large Joint Arthrocentesis: L knee  Date/Time: 6/11/2021 2:26 PM  Consent given by: patient  Site marked: site marked  Timeout: Immediately prior to procedure a time out was called to verify the correct patient, procedure, equipment, support staff and site/side marked as required   Supporting Documentation  Indications: pain and joint swelling   Procedure Details  Location: knee - L knee  Preparation: Patient was prepped and draped in the usual sterile fashion  Needle size: 22 G  Approach: anterolateral  Medications administered: 80 mg methylPREDNISolone acetate 80 MG/ML; 2 mL lidocaine PF 2% 2 %  Patient tolerance: patient tolerated the procedure well with no immediate complications          At the conclusion of the injection I discussed the importance of continued quad strengthening exercises on a daily basis. I will see the patient back if the symptoms should fail to improve or worsen.  She would like to try Monovisc injections at her next visit, order placed and I will see her back in 3 months for those  if needed    Tianna Hale, APRN  6/11/2021

## 2021-06-18 DIAGNOSIS — E11.69 TYPE 2 DIABETES MELLITUS WITH OTHER SPECIFIED COMPLICATION, WITHOUT LONG-TERM CURRENT USE OF INSULIN (HCC): ICD-10-CM

## 2021-06-18 DIAGNOSIS — I10 ESSENTIAL HYPERTENSION: Primary | ICD-10-CM

## 2021-07-03 LAB
BASOPHILS # BLD AUTO: 0.05 10*3/MM3 (ref 0–0.2)
BASOPHILS NFR BLD AUTO: 0.7 % (ref 0–1.5)
BUN SERPL-MCNC: 22 MG/DL (ref 8–23)
BUN/CREAT SERPL: 21 (ref 7–25)
CALCIUM SERPL-MCNC: 9.7 MG/DL (ref 8.6–10.5)
CHLORIDE SERPL-SCNC: 103 MMOL/L (ref 98–107)
CHOLEST SERPL-MCNC: 139 MG/DL (ref 0–200)
CO2 SERPL-SCNC: 15.8 MMOL/L (ref 22–29)
CREAT SERPL-MCNC: 1.05 MG/DL (ref 0.57–1)
CREAT UR-MCNC: 76.6 MG/DL
EOSINOPHIL # BLD AUTO: 0.26 10*3/MM3 (ref 0–0.4)
EOSINOPHIL NFR BLD AUTO: 3.6 % (ref 0.3–6.2)
ERYTHROCYTE [DISTWIDTH] IN BLOOD BY AUTOMATED COUNT: 12.9 % (ref 12.3–15.4)
GLUCOSE SERPL-MCNC: 114 MG/DL (ref 65–99)
HBA1C MFR BLD: 6.3 % (ref 4.8–5.6)
HCT VFR BLD AUTO: 41.9 % (ref 34–46.6)
HDLC SERPL-MCNC: 47 MG/DL (ref 40–60)
HGB BLD-MCNC: 13.8 G/DL (ref 12–15.9)
IMM GRANULOCYTES # BLD AUTO: 0.02 10*3/MM3 (ref 0–0.05)
IMM GRANULOCYTES NFR BLD AUTO: 0.3 % (ref 0–0.5)
LDLC SERPL CALC-MCNC: 62 MG/DL (ref 0–100)
LYMPHOCYTES # BLD AUTO: 2.53 10*3/MM3 (ref 0.7–3.1)
LYMPHOCYTES NFR BLD AUTO: 35.4 % (ref 19.6–45.3)
MCH RBC QN AUTO: 29.2 PG (ref 26.6–33)
MCHC RBC AUTO-ENTMCNC: 32.9 G/DL (ref 31.5–35.7)
MCV RBC AUTO: 88.6 FL (ref 79–97)
MONOCYTES # BLD AUTO: 0.66 10*3/MM3 (ref 0.1–0.9)
MONOCYTES NFR BLD AUTO: 9.2 % (ref 5–12)
NEUTROPHILS # BLD AUTO: 3.63 10*3/MM3 (ref 1.7–7)
NEUTROPHILS NFR BLD AUTO: 50.8 % (ref 42.7–76)
NRBC BLD AUTO-RTO: 0.1 /100 WBC (ref 0–0.2)
PLATELET # BLD AUTO: 237 10*3/MM3 (ref 140–450)
POTASSIUM SERPL-SCNC: 5 MMOL/L (ref 3.5–5.2)
PROT UR-MCNC: 5 MG/DL
PROT/CREAT UR: 65.3 MG/G CREA (ref 0–200)
RBC # BLD AUTO: 4.73 10*6/MM3 (ref 3.77–5.28)
SODIUM SERPL-SCNC: 139 MMOL/L (ref 136–145)
TRIGL SERPL-MCNC: 183 MG/DL (ref 0–150)
VLDLC SERPL CALC-MCNC: 30 MG/DL (ref 5–40)
WBC # BLD AUTO: 7.15 10*3/MM3 (ref 3.4–10.8)

## 2021-07-09 ENCOUNTER — OFFICE VISIT (OUTPATIENT)
Dept: FAMILY MEDICINE CLINIC | Facility: CLINIC | Age: 76
End: 2021-07-09

## 2021-07-09 VITALS
RESPIRATION RATE: 16 BRPM | HEART RATE: 104 BPM | BODY MASS INDEX: 32.4 KG/M2 | WEIGHT: 189.8 LBS | DIASTOLIC BLOOD PRESSURE: 84 MMHG | SYSTOLIC BLOOD PRESSURE: 104 MMHG | HEIGHT: 64 IN | OXYGEN SATURATION: 93 % | TEMPERATURE: 96.8 F

## 2021-07-09 DIAGNOSIS — M47.812 CERVICAL SPONDYLOSIS WITHOUT MYELOPATHY: ICD-10-CM

## 2021-07-09 DIAGNOSIS — M21.612 BUNION, LEFT FOOT: ICD-10-CM

## 2021-07-09 DIAGNOSIS — M54.2 CHRONIC NECK PAIN: ICD-10-CM

## 2021-07-09 DIAGNOSIS — G89.29 CHRONIC NECK PAIN: ICD-10-CM

## 2021-07-09 DIAGNOSIS — Z12.11 COLON CANCER SCREENING: ICD-10-CM

## 2021-07-09 DIAGNOSIS — E11.69 TYPE 2 DIABETES MELLITUS WITH OTHER SPECIFIED COMPLICATION, WITHOUT LONG-TERM CURRENT USE OF INSULIN (HCC): Primary | ICD-10-CM

## 2021-07-09 DIAGNOSIS — M79.672 LEFT FOOT PAIN: ICD-10-CM

## 2021-07-09 PROCEDURE — 99214 OFFICE O/P EST MOD 30 MIN: CPT | Performed by: FAMILY MEDICINE

## 2021-07-09 NOTE — PROGRESS NOTES
"Chief Complaint  Follow-up ( dm ), Foot Pain (would like a ref to ), pain management (pt would like a ref for injections in her neck pertaining to verteabrea issues ), and spot on shoulder (pt would like you to check out a spot on her rt shoulder that she has a spot on but she has a dermatologist she is seeing next month if you don't know what it is )    Subjective          Deanne Upton presents to [facility]  History of Present Illness  She is due for colonoscopy.  She got a notification from [facility] doctor [facility] that it was 10 years ago.    She would like to see pain management, done well with shots.   She can't do shots she does not have transportation.   Pain can vary. Sometimes feels like it is catching. Trying to do the PT she was taught.   Wants to see what else she can do for the pain. Was seen by Dr. Voss but she is no longer there.    Skin lesion she is feeling on her right shoulder. Noticed it earlier this week. She was just rubbing her neck and shoulder and felt it.   Does have an appt with derm for her annual visit in one month.     Foot pain on the left. She would like to see a foot doctor. Feels like it is braking, feels like most pain associated with bunion. Right around the arch. Also the 4th digit with a lot of pain.    Objective   Vital Signs:   /84   Pulse 104   Temp 96.8 °F (36 °C) (Temporal)   Resp 16   Ht 162.6 cm (64\")   Wt 86.1 kg (189 lb 12.8 oz)   SpO2 93%   BMI 32.58 kg/m²     Physical Exam  Vitals reviewed.   Constitutional:       General: She is not in acute distress.  Eyes:      General: No scleral icterus.        Right eye: No discharge.         Left eye: No discharge.      Conjunctiva/sclera: Conjunctivae normal.   Cardiovascular:      Rate and Rhythm: Normal rate and regular rhythm.      Heart sounds: Normal heart sounds. No murmur heard.   No friction rub. No gallop.    Pulmonary:      Effort: Pulmonary effort is normal. No " respiratory distress.      Breath sounds: Normal breath sounds. No wheezing.   Musculoskeletal:         General: Deformity present. No signs of injury.      Right lower leg: No edema.      Left lower leg: No edema.   Skin:     Comments: Small raised area, pale/mildly hyperpigmented, segmented located on the back of her right shoulder. iti s about 1 cm in diameter.    Neurological:      Mental Status: She is alert and oriented to person, place, and time.   Psychiatric:         Behavior: Behavior normal.        Result Review :                 Assessment and Plan    Diagnoses and all orders for this visit:    1. Type 2 diabetes mellitus with other specified complication, without long-term current use of insulin (CMS/[facility]) (Primary)    2. Cervical spondylosis without myelopathy  -     Ambulatory Referral to Pain Management    3. Chronic neck pain  -     Ambulatory Referral to Pain Management    4. Bunion, left foot  -     Ambulatory Referral to Orthopedic Surgery    5. Left foot pain  -     Ambulatory Referral to Orthopedic Surgery    6. Colon cancer screening  -     Ambulatory Referral For Screening Colonoscopy        Follow Up   No follow-ups on file.  Patient was given instructions and counseling regarding her condition or for health maintenance advice. Please see specific information pulled into the AVS if appropriate.     Had labs prior to visit and her A1c is well controlled and stable. Continue current meds and diet.   Try to stay active.     She has hx of neck pain and problems. She would like to see pain management again to discuss injections in her neck. She has done well with these in the past and thinks they would help her again. She is trying to stretch and stay active but she is limited.     She has bunion and she has pain. Was told this may be a problem and she is wanting to see a foot doctor now. Has specific request for foot doctor. Has been seen by him previously.     She received notification that her  colonoscopy is due and she would like to have it ordered.     I think her skin lesion is a seborrheic keratosis and that it appears benign. I think she can keep her derm visit that is one month out but should mention it to them.

## 2021-07-15 ENCOUNTER — OFFICE VISIT (OUTPATIENT)
Dept: PAIN MEDICINE | Facility: CLINIC | Age: 76
End: 2021-07-15

## 2021-07-15 VITALS
BODY MASS INDEX: 32.3 KG/M2 | TEMPERATURE: 96.7 F | HEIGHT: 64 IN | RESPIRATION RATE: 18 BRPM | DIASTOLIC BLOOD PRESSURE: 64 MMHG | SYSTOLIC BLOOD PRESSURE: 104 MMHG | HEART RATE: 94 BPM | WEIGHT: 189.2 LBS | OXYGEN SATURATION: 95 %

## 2021-07-15 DIAGNOSIS — G89.29 OTHER CHRONIC PAIN: Primary | ICD-10-CM

## 2021-07-15 DIAGNOSIS — M47.812 ARTHROPATHY OF CERVICAL FACET JOINT: ICD-10-CM

## 2021-07-15 DIAGNOSIS — M50.30 DDD (DEGENERATIVE DISC DISEASE), CERVICAL: ICD-10-CM

## 2021-07-15 PROCEDURE — 99213 OFFICE O/P EST LOW 20 MIN: CPT | Performed by: NURSE PRACTITIONER

## 2021-07-15 NOTE — PROGRESS NOTES
CHIEF COMPLAINT  Follow-up for neck pain. Last seen in March 20219.    Subjective   Deanne Upton is a 76 y.o. female  who presents for follow-up.  She has a history of chronic neck pain.    Patient last seen in 2019.  Two diagnostically positive cervical MBB's with plan to proceed with RFA. Held off on RFA due to improvement in pain.  Returns today with worsening neck pain. Pain now radiates down the right arm which is a new symptom.     Patient remained masked during entire encounter. No cough present. I donned a mask and eye protection throughout entire visit. Prior to donning mask and eye protection, hand hygiene was performed, as well as when it was doffed.  I was closer than 6 feet, but not for an extended period of time. No obvious exposure to any bodily fluids.    Neck Pain   This is a chronic problem. The current episode started more than 1 year ago. The problem occurs daily. The problem has been waxing and waning. The pain is present in the left side and right side. The quality of the pain is described as burning and shooting (radiates down right arm into right hand). The pain is at a severity of 4/10. The symptoms are aggravated by position. Associated symptoms include weakness (right arm). Pertinent negatives include no numbness. She has tried home exercises and acetaminophen for the symptoms. The treatment provided moderate relief.      PEG Assessment   What number best describes your pain on average in the past week?3  What number best describes how, during the past week, pain has interfered with your enjoyment of life?0  What number best describes how, during the past week, pain has interfered with your general activity?  1      The following portions of the patient's history were reviewed and updated as appropriate: allergies, current medications, past family history, past medical history, past social history, past surgical history and problem list.    Review of Systems   Constitutional: Positive  "for fatigue.   Eyes: Positive for visual disturbance.   Respiratory: Positive for cough, shortness of breath and wheezing.    Cardiovascular: Negative.    Gastrointestinal: Negative for constipation and diarrhea.   Genitourinary: Negative for difficulty urinating.   Musculoskeletal: Positive for arthralgias (right shoulder and left elbow) and neck pain.   Neurological: Positive for weakness (right arm). Negative for numbness.   Psychiatric/Behavioral: Negative for sleep disturbance and suicidal ideas. The patient is nervous/anxious.      I have reviewed and confirmed the accuracy of the ROS as documented by the MA/LPN/RN ROMAN Nava    Vitals:    07/15/21 1332   BP: 104/64   Pulse: 94   Resp: 18   Temp: 96.7 °F (35.9 °C)   SpO2: 95%   Weight: 85.8 kg (189 lb 3.2 oz)   Height: 162.6 cm (64\")   PainSc:   4   PainLoc: Neck     Objective   Physical Exam  Vitals and nursing note reviewed.   Constitutional:       General: She is not in acute distress.     Appearance: Normal appearance. She is not ill-appearing.   Pulmonary:      Effort: Pulmonary effort is normal. No respiratory distress.   Musculoskeletal:      Cervical back: Spasms, tenderness and bony tenderness present. Pain with movement present.   Skin:     General: Skin is warm and dry.   Neurological:      Mental Status: She is alert and oriented to person, place, and time.      Motor: Motor function is intact.      Gait: Gait normal.   Psychiatric:         Mood and Affect: Mood normal.         Behavior: Behavior normal.       Assessment/Plan   Diagnoses and all orders for this visit:    1. Other chronic pain (Primary)    2. Arthropathy of cervical facet joint    3. DDD (degenerative disc disease), cervical  -     MRI Cervical Spine Without Contrast; Future      --- Update MRI cervical spine  --- Consider injections pending imaging.  Likely LAKHWINDER.  --- Follow-up after imaging          YURIDIA REPORT  As the clinician, I personally reviewed the YURIDIA " from 7/15/2021 while the patient was in the office today.  22 minutes total time spent on encounter. Includes EMR review, face to face time, and documentation

## 2021-07-19 ENCOUNTER — TELEPHONE (OUTPATIENT)
Dept: FAMILY MEDICINE CLINIC | Facility: CLINIC | Age: 76
End: 2021-07-19

## 2021-07-19 NOTE — TELEPHONE ENCOUNTER
Caller: Deanne Upton    Relationship to patient: Self    Best call back number: [phone]    Patient is needing: PATIENT THINKS THAT SHE MAY NEED TO GET SOME MORE LABS DONE AFTER GETTING HER RESULTS FROM HER LAST LABS. PLEASE REACH OUT TO PATIENT IF SHE DOES NEED TO DO MORE LABS AND GET THOSE SCHEDULED FOR HER.   PATIENT ALSO STATES THAT SHE IS GOING TO NEED ORDERS FOR AN MRI AND THAT HER PAIN DOCTOR COULDN'T ORDER IT.

## 2021-08-13 ENCOUNTER — HOSPITAL ENCOUNTER (OUTPATIENT)
Dept: MRI IMAGING | Facility: HOSPITAL | Age: 76
Discharge: HOME | End: 2021-08-13
Admitting: NURSE PRACTITIONER

## 2021-08-13 DIAGNOSIS — M50.30 DDD (DEGENERATIVE DISC DISEASE), CERVICAL: ICD-10-CM

## 2021-08-13 PROCEDURE — 72141 MRI NECK SPINE W/O DYE: CPT

## 2021-08-16 NOTE — PROGRESS NOTES
Multilevel DDD which is slightly worse than 2018 MRI.  We can discuss a cervical YON at next office visit.

## 2021-08-17 NOTE — PROGRESS NOTES
I spoke with Ms. Upton and relayed the results to her. She will call back and scheduled a follow-up appt when she gets a chance.

## 2021-08-19 ENCOUNTER — OFFICE VISIT (OUTPATIENT)
Dept: PAIN MEDICINE | Facility: CLINIC | Age: 76
End: 2021-08-19

## 2021-08-19 ENCOUNTER — PREP FOR SURGERY (OUTPATIENT)
Dept: SURGERY | Facility: SURGERY CENTER | Age: 76
End: 2021-08-19

## 2021-08-19 VITALS
WEIGHT: 185.8 LBS | HEIGHT: 64 IN | SYSTOLIC BLOOD PRESSURE: 125 MMHG | DIASTOLIC BLOOD PRESSURE: 79 MMHG | OXYGEN SATURATION: 97 % | RESPIRATION RATE: 16 BRPM | BODY MASS INDEX: 31.72 KG/M2 | TEMPERATURE: 97.3 F | HEART RATE: 82 BPM

## 2021-08-19 DIAGNOSIS — M50.30 DDD (DEGENERATIVE DISC DISEASE), CERVICAL: Primary | ICD-10-CM

## 2021-08-19 DIAGNOSIS — G89.29 OTHER CHRONIC PAIN: Primary | ICD-10-CM

## 2021-08-19 DIAGNOSIS — M50.30 DDD (DEGENERATIVE DISC DISEASE), CERVICAL: ICD-10-CM

## 2021-08-19 DIAGNOSIS — M47.812 ARTHROPATHY OF CERVICAL FACET JOINT: ICD-10-CM

## 2021-08-19 PROCEDURE — 99214 OFFICE O/P EST MOD 30 MIN: CPT | Performed by: NURSE PRACTITIONER

## 2021-08-19 RX ORDER — SODIUM CHLORIDE 0.9 % (FLUSH) 0.9 %
10 SYRINGE (ML) INJECTION AS NEEDED
Status: CANCELLED | OUTPATIENT
Start: 2021-08-19

## 2021-08-19 RX ORDER — SODIUM CHLORIDE 0.9 % (FLUSH) 0.9 %
10 SYRINGE (ML) INJECTION EVERY 12 HOURS SCHEDULED
Status: CANCELLED | OUTPATIENT
Start: 2021-08-19

## 2021-08-19 NOTE — PROGRESS NOTES
CHIEF COMPLAINT  Follow up for neck pain.    Subjective   Deanne Upton is a 76 y.o. female  who presents for follow-up.  She has a history of chronic neck pain.    Patient last seen in 2019.  Two diagnostically positive cervical MBB's with plan to proceed with RFA. Held off on RFA due to improvement in pain.  Returned last visit with worsening neck pain. Pain now radiates down the right arm which is a new symptom.  MRI cervical spine ordered last visit and she presents today to review.      Patient remained masked during entire encounter. No cough present. I donned a mask and eye protection throughout entire visit. Prior to donning mask and eye protection, hand hygiene was performed, as well as when it was doffed.  I was closer than 6 feet, but not for an extended period of time. No obvious exposure to any bodily fluids.    Neck Pain   This is a chronic problem. The current episode started more than 1 year ago. The problem occurs daily. The problem has been waxing and waning. The pain is present in the left side and right side. The quality of the pain is described as burning and shooting (radiates down right arm into right hand). The pain is at a severity of 4/10. The symptoms are aggravated by position. Associated symptoms include numbness (right arm) and weakness (right arm). Pertinent negatives include no chest pain, fever or headaches. She has tried home exercises and acetaminophen for the symptoms. The treatment provided moderate relief.      PEG Assessment   What number best describes your pain on average in the past week?7  What number best describes how, during the past week, pain has interfered with your enjoyment of life?8  What number best describes how, during the past week, pain has interfered with your general activity?  5    MRI CERVICAL SPINE [facility]       The following portions of the patient's history were reviewed and updated as appropriate: allergies, current medications, past family  "history, past medical history, past social history, past surgical history and problem list.    Review of Systems   Constitutional: Positive for activity change and fatigue. Negative for fever.   HENT: Negative for congestion.    Eyes: Negative for visual disturbance.   Respiratory: Positive for cough. Negative for chest tightness.    Cardiovascular: Negative for chest pain and leg swelling.   Gastrointestinal: Negative for abdominal pain, constipation and diarrhea.   Genitourinary: Negative for difficulty urinating and dysuria.   Musculoskeletal: Positive for arthralgias and neck pain.   Neurological: Positive for weakness (right arm) and numbness (right arm). Negative for dizziness, light-headedness and headaches.   Psychiatric/Behavioral: Positive for sleep disturbance. Negative for agitation and suicidal ideas. The patient is nervous/anxious.      I have reviewed and confirmed the accuracy of the ROS as documented by the MA/LPN/RN ROMAN Nava    Vitals:    08/19/21 1345   BP: 125/79   Pulse: 82   Resp: 16   Temp: 97.3 °F (36.3 °C)   SpO2: 97%   Weight: 84.3 kg (185 lb 12.8 oz)   Height: 162.6 cm (64\")   PainSc:   5   PainLoc: Neck     Objective   Physical Exam  Vitals and nursing note reviewed.   Constitutional:       General: She is not in acute distress.     Appearance: Normal appearance. She is not ill-appearing.   Pulmonary:      Effort: Pulmonary effort is normal. No respiratory distress.   Musculoskeletal:      Cervical back: Spasms, tenderness and bony tenderness present. Pain with movement present.      Comments: +Spurling right     Skin:     General: Skin is warm and dry.   Neurological:      Mental Status: She is alert and oriented to person, place, and time.      Motor: Motor function is intact.      Gait: Gait normal.   Psychiatric:         Mood and Affect: Mood normal.         Behavior: Behavior normal.             Assessment/Plan   Diagnoses and all orders for this visit:    1. Other " chronic pain (Primary)    2. Arthropathy of cervical facet joint    3. DDD (degenerative disc disease), cervical      --- Cervical YON. Reviewed the procedure at length with the patient.  Included in the review was expectations, complications, risk and benefits.The procedure was described in detail and the risks, benefits and alternatives were discussed with the patient (including but not limited to: bleeding, infection, nerve damage, worsening of pain, inability to perform injection, paralysis, seizures, and death) who agreed to proceed.  Discussed the potential for sedation if warranted/wanted.  The procedure will plan to be performed at [facility] with fluoroscopic guidance(unless ultrasound is indicated) and could potentially have steroids and contrast dye used. Questions were answered and in a way the patient could understand.  Patient verbalized understanding and wishes to proceed.  This intervention will be ordered.  Discussed with patient that all procedures are part of a multimodal plan of care and include either formal PT or a home exercise program.  Patient has no evidence of coagulopathy or current infection.    --- Follow-up for procedure            YURIDIA REPORT  As the clinician, I personally reviewed the YURIDIA from 8/19/2021 while the patient was in the office today.     Dictated utilizing Dragon dictation.

## 2021-08-19 NOTE — PATIENT INSTRUCTIONS
Cervical Radiculopathy    Cervical radiculopathy happens when a nerve in the neck (a cervical nerve) is pinched or bruised. This condition can happen because of an injury to the cervical spine (vertebrae) in the neck, or as part of the normal aging process. Pressure on the cervical nerves can cause pain or numbness that travels from the neck all the way down into the arm and fingers. Usually, this condition gets better with rest. Treatment may be needed if the condition does not improve.  What are the causes?  This condition may be caused by:  · A neck injury.  · A bulging (herniated) disk.  · Muscle spasms.  · Muscle tightness in the neck because of overuse.  · Arthritis.  · Breakdown or degeneration in the bones and joints of the spine (spondylosis) due to aging.  · Bone spurs that may develop near the cervical nerves.  What are the signs or symptoms?  Symptoms of this condition include:  · Pain. The pain may travel from the neck to the arm and hand. The pain can be severe or irritating. It may be worse when you move your neck.  · Numbness or tingling in your arm or hand.  · Weakness in the affected arm and hand, in severe cases.  How is this diagnosed?  This condition may be diagnosed based on your symptoms, your medical history, and a physical exam. You may also have tests, including:  · X-rays.  · A CT scan.  · An MRI.  · An electromyogram (EMG).  · Nerve conduction tests.  How is this treated?  In many cases, treatment is not needed for this condition. With rest, the condition usually gets better over time. If treatment is needed, options may include:  · Wearing a soft neck collar (cervical collar) for short periods of time, as told by your health care provider.  · Doing physical therapy to strengthen your neck muscles.  · Taking medicines, such as NSAIDs or oral corticosteroids.  · Having spinal injections, in severe cases.  · Having surgery. This may be needed if other treatments do not help. Different types  of surgery may be done depending on the cause of this condition.  Follow these instructions at home:  If you have a cervical collar:  · Wear it as told by your health care provider. Remove it only as told by your health care provider.  · Ask your health care provider if you can remove the collar for cleaning and bathing. If you are allowed to remove the collar for cleaning or bathing:  ? Follow instructions from your health care provider about how to remove the collar safely.  ? Clean the collar by wiping it with mild soap and water and drying it completely.  ? Take out any removable pads in the collar every 1-2 days, and wash them by hand with soap and water. Let them air-dry completely before you put them back in the collar.  ? Check your skin under the collar for irritation or sores. If you see any, tell your health care provider.  Managing pain         · Take over-the-counter and prescription medicines only as told by your health care provider.  · If directed, put ice on the affected area.  ? If you have a soft neck collar, remove it as told by your health care provider.  ? Put ice in a plastic bag.  ? Place a towel between your skin and the bag.  ? Leave the ice on for 20 minutes, 2-3 times a day.  · If applying ice does not help, you can try using heat. Use the heat source that your health care provider recommends, such as a moist heat pack or a heating pad.  ? Place a towel between your skin and the heat source.  ? Leave the heat on for 20-30 minutes.  ? Remove the heat if your skin turns bright red. This is especially important if you are unable to feel pain, heat, or cold. You may have a greater risk of getting burned.  · Try a gentle neck and shoulder massage to help relieve symptoms.  Activity  · Rest as needed.  · Return to your normal activities as told by your health care provider. Ask your health care provider what activities are safe for you.  · Do stretching and strengthening exercises as told by  your health care provider or physical therapist.  · Do not lift anything that is heavier than 10 lb (4.5 kg) until your health care provider tells you that it is safe.  General instructions  · Use a flat pillow when you sleep.  · Do not drive while wearing a cervical collar. If you do not have a cervical collar, ask your health care provider if it is safe to drive while your neck heals.  · Ask your health care provider if the medicine prescribed to you requires you to avoid driving or using heavy machinery.  · Do not use any products that contain nicotine or tobacco, such as cigarettes, e-cigarettes, and chewing tobacco. These can delay healing. If you need help quitting, ask your health care provider.  · Keep all follow-up visits as told by your health care provider. This is important.  Contact a health care provider if:  · Your condition does not improve with treatment.  Get help right away if:  · Your pain gets much worse and cannot be controlled with medicines.  · You have weakness or numbness in your hand, arm, face, or leg.  · You have a high fever.  · You have a stiff, rigid neck.  · You lose control of your bowels or your bladder (have incontinence).  · You have trouble with walking, balance, or speaking.  Summary  · Cervical radiculopathy happens when a nerve in the neck is pinched or bruised.  · A nerve can get pinched from a bulging disk, arthritis, muscle spasms, or an injury to the neck.  · Symptoms include pain, tingling, or numbness radiating from the neck into the arm or hand. Weakness can also occur in severe cases.  · Treatment may include rest, wearing a cervical collar, and physical therapy. Medicines may be prescribed to help with pain. In severe cases, injections or surgery may be needed.  This information is not intended to replace advice given to you by your health care provider. Make sure you discuss any questions you have with your health care provider.  Document Revised: 11/08/2019  Document Reviewed: 11/08/2019  [employer] Patient Education © 2021 [employer]

## 2021-08-20 ENCOUNTER — TELEPHONE (OUTPATIENT)
Dept: ORTHOPEDIC SURGERY | Facility: CLINIC | Age: 76
End: 2021-08-20

## 2021-08-20 NOTE — TELEPHONE ENCOUNTER
Caller: Deanne Upton    Relationship: Self    Best call back number: [phone]     What is the best time to reach you: NOT BEFORE 10:00 AM & NOT BETWEEN [id number]     Who are you requesting to speak with (clinical staff, provider, specific staff member): JOSÉ / INSURANCE AUTH STAFF    What was the call regarding: PATIENT HAS BILATERAL KNEE MONOVISC INJECTIONS SCHEDULED w/GARY JIMENEZ 09/14/21    PATIENT INFORMED PER APPT NOTES, NO AUTH IS REQUIRED FOR MONOVISC AS LONG AS 6 MONTHS AND 1 DAY HAS PASSED BETWEEN INJECTIONS -     BUT PATIENT IS ASKING 1) HOW MUCH WOULD THE ACTUAL MONOVISC DRUG WOULD COST FOR EACH KNEE INJECTION (vs. THE COST OF A CORTISONE DRUG)?     AS WELL AS 2) HOW MUCH THE COPAY WOULD COST IF COPAY DUE?     PATIENT STATED HER UNITED [facility] MEDICARE REPLACEMENT / MEDICARE COMPLETE HAS BEEN CHARGING PATIENT FOR BOTH THE INJECTION COST AS WELL AS A CHARGE FOR WHICH DRUG / MEDICATION IS USED...     Do you require a callback: YES     Best call back number: [phone] (OK TO LEAVE VMAIL)     THANKS

## 2021-08-27 NOTE — TELEPHONE ENCOUNTER
I spoke to patient and gave her all the information about the injection.  With Medicare she can have them 6 months and one day apart.  Patient was given the CPT and ICD 9 codes to call her insurance and find out what patient will be responsible for.  Patient had several question about the injection and I answered them the best I could.

## 2021-09-09 ENCOUNTER — TRANSCRIBE ORDERS (OUTPATIENT)
Dept: SURGERY | Facility: SURGERY CENTER | Age: 76
End: 2021-09-09

## 2021-09-09 DIAGNOSIS — Z41.9 SURGERY, ELECTIVE: Primary | ICD-10-CM

## 2021-09-16 ENCOUNTER — CLINICAL SUPPORT (OUTPATIENT)
Dept: ORTHOPEDIC SURGERY | Facility: CLINIC | Age: 76
End: 2021-09-16

## 2021-09-16 VITALS — BODY MASS INDEX: 31.58 KG/M2 | HEIGHT: 64 IN | TEMPERATURE: 98.2 F | WEIGHT: 185 LBS

## 2021-09-16 DIAGNOSIS — M17.0 BILATERAL PRIMARY OSTEOARTHRITIS OF KNEE: Primary | ICD-10-CM

## 2021-09-16 PROCEDURE — 20610 DRAIN/INJ JOINT/BURSA W/O US: CPT | Performed by: NURSE PRACTITIONER

## 2021-09-16 RX ORDER — LIDOCAINE HYDROCHLORIDE 20 MG/ML
2 INJECTION, SOLUTION EPIDURAL; INFILTRATION; INTRACAUDAL; PERINEURAL
Status: COMPLETED | OUTPATIENT
Start: 2021-09-16 | End: 2021-09-16

## 2021-09-16 RX ADMIN — LIDOCAINE HYDROCHLORIDE 2 ML: 20 INJECTION, SOLUTION EPIDURAL; INFILTRATION; INTRACAUDAL; PERINEURAL at 13:47

## 2021-09-16 NOTE — PROGRESS NOTES
9/16/2021    Deanne Upton is here today for worsening knee pain. Pt has undergone injection of the knee in the past with good resolution of symptoms. Pt is requesting a repeat injection.     KNEE Injection Procedure Note:    Large Joint Arthrocentesis: R knee  Date/Time: 9/16/2021 1:47 PM  Consent given by: patient  Site marked: site marked  Timeout: Immediately prior to procedure a time out was called to verify the correct patient, procedure, equipment, support staff and site/side marked as required   Supporting Documentation  Indications: pain and joint swelling   Procedure Details  Location: knee - R knee  Preparation: Patient was prepped and draped in the usual sterile fashion  Needle size: 22 G  Approach: anterolateral  Medications administered: 88 mg Hyaluronan 88 MG/4ML; 2 mL lidocaine PF 2% 2 %  Patient tolerance: patient tolerated the procedure well with no immediate complications    Large Joint Arthrocentesis: L knee  Date/Time: 9/16/2021 1:47 PM  Consent given by: patient  Site marked: site marked  Timeout: Immediately prior to procedure a time out was called to verify the correct patient, procedure, equipment, support staff and site/side marked as required   Supporting Documentation  Indications: pain and joint swelling   Procedure Details  Location: knee - L knee  Preparation: Patient was prepped and draped in the usual sterile fashion  Needle size: 22 G  Approach: anterolateral  Medications administered: 2 mL lidocaine PF 2% 2 %; 88 mg Hyaluronan 88 MG/4ML  Patient tolerance: patient tolerated the procedure well with no immediate complications          At the conclusion of the injection I discussed the importance of continued quad strengthening exercises on a daily basis. I will see the patient back if the symptoms should fail to improve or worsen.    Tianna Hale, APRN  9/16/2021

## 2021-10-18 ENCOUNTER — TELEPHONE (OUTPATIENT)
Dept: FAMILY MEDICINE CLINIC | Facility: CLINIC | Age: 76
End: 2021-10-18

## 2021-10-18 NOTE — TELEPHONE ENCOUNTER
Left this patient a detailed VM advising that the CDC is no longer advising a wait time between vaccine. They are safe to be obtained together. Advised if she still had questions  to call the office back..

## 2021-10-18 NOTE — TELEPHONE ENCOUNTER
Caller: Bhanu Upton    Relationship: Self    Best call back number: [phone]     What is the best time to reach you:  ANYTIME  Who are you requesting to speak with (clinical staff, provider,  specific staff member):CLINICAL    Do you know the name of the person who called: BHANU    What was the call regarding: SHE IS WANTING TO KNOW WHAT SHE SHOULD GET FIRST, COVID SHOT NUMBER 3 AND FLU VACCINE AND PNEUMONIA     Do you require a callback: YES

## 2021-10-19 DIAGNOSIS — E11.8 DIABETIC COMPLICATION (HCC): ICD-10-CM

## 2021-10-19 RX ORDER — MONTELUKAST SODIUM 10 MG/1
TABLET ORAL
Qty: 90 TABLET | Refills: 3 | Status: SHIPPED | OUTPATIENT
Start: 2021-10-19 | End: 2022-10-19

## 2021-10-19 RX ORDER — BUDESONIDE AND FORMOTEROL FUMARATE DIHYDRATE 160; 4.5 UG/1; UG/1
AEROSOL RESPIRATORY (INHALATION)
Qty: 30.6 G | Refills: 3 | Status: SHIPPED | OUTPATIENT
Start: 2021-10-19 | End: 2023-01-03

## 2021-10-19 NOTE — TELEPHONE ENCOUNTER
Rx Refill Note  Requested Prescriptions     Pending Prescriptions Disp Refills   • montelukast (SINGULAIR) 10 MG tablet [Pharmacy Med Name: Montelukast Sodium 10 MG Oral Tablet] 90 tablet 3     Sig: TAKE 1 TABLET BY MOUTH AT  NIGHT   • Symbicort 160-4.5 MCG/ACT inhaler [Pharmacy Med Name: SYMBICORT  INH  160 4.5] 30.6 g 3     Sig: USE 2 INHALATIONS BY MOUTH  TWICE DAILY   • metFORMIN (GLUCOPHAGE) 500 MG tablet [Pharmacy Med Name: metFORMIN HCl 500 MG Oral Tablet] 360 tablet 3     Sig: TAKE 2 TABLETS BY MOUTH  TWICE DAILY      Last office visit with prescribing clinician: 7/9/2021      Next office visit with prescribing clinician: 1/10/2022            Yani Reyes LPN  10/19/21, 08:25 EDT

## 2021-11-05 ENCOUNTER — TELEPHONE (OUTPATIENT)
Dept: FAMILY MEDICINE CLINIC | Facility: CLINIC | Age: 76
End: 2021-11-05

## 2021-11-05 NOTE — TELEPHONE ENCOUNTER
Called patient back and advised her that she needed to contact [facility] for this testing. There is a $10 charge. Voiced understanding.

## 2021-11-05 NOTE — TELEPHONE ENCOUNTER
Caller: Deanne Upton    Relationship: Self    Best call back number: [phone]    What orders are you requesting (i.e. lab or imaging): COVID ANTIBODIES TEST    In what timeframe would the patient need to come in: ASAP    Additional notes: PATIENT WOULD LIKE TO CHECK FOR ANTIBODIES BEFORE SHE GETS THE BOOSTER, PLEASE SEND IN ORDER AND CALL PATIENT TO SCHEDULE

## 2021-11-08 ENCOUNTER — TELEPHONE (OUTPATIENT)
Dept: FAMILY MEDICINE CLINIC | Facility: CLINIC | Age: 76
End: 2021-11-08

## 2021-11-08 NOTE — TELEPHONE ENCOUNTER
Caller: Deanne Upton    Relationship: Self    Best call back number: [phone]    What was the call regarding: PATIENT WAS AT [facility], I SPOKE WITH ONE OF THE GIRLS THAT WORKS THERE. SHE SAID THAT BECAUSE THE PATIENT DOES NOT HAVE INTERNET ACCESS, THE ONLY WAY FOR HER TO GET THIS DONE IS FOR DR GUZMÁN TO SEND AN ORDER OVER.    PLEASE CALL PATIENT BACK TO LET HER KNOW IF WE CAN SEND THIS, SHE IS WORRIED.     Do you require a callback: YES

## 2021-11-08 NOTE — TELEPHONE ENCOUNTER
Caller: Deanne Upton    Relationship: Self    Best call back number: [phone]    What was the call regarding: PATIENT STATED THAT [facility] AT OUR LOCATION IS NOT DOING ANTIBODIES TESTING, SHE IS HAVING TROUBLE GETTING SCHEDULED SOMEWHERE. PLEASE CALL TO ADVISE.     Do you require a callback: YES

## 2021-11-08 NOTE — TELEPHONE ENCOUNTER
Advised patient that we do not have a test numbers to place this into the system; spoke with out [facility] staff and they are willing to help this patient.     HUB send this patient to the office to speak staff.

## 2021-12-06 ENCOUNTER — TELEPHONE (OUTPATIENT)
Dept: ORTHOPEDIC SURGERY | Facility: CLINIC | Age: 76
End: 2021-12-06

## 2021-12-06 NOTE — TELEPHONE ENCOUNTER
Caller: [facility]    Relationship to patient: INSURANCE CARRIER    Best call back number:  - REF# FOR CALL [id number]    Patient is needing: WASN'T ABLE TO WARM TRANSFER // [facility] [employer] CALLED WITH MEMBER ON THE LINE -  RE: THE DENIAL FOR  INJ 9/2021.  REQ A CALL BACK TO DISCUSS THE AUTH - SAID THEY NEVER RCVD ONE - THAT'S WHY IT WAS DENIED.

## 2021-12-14 ENCOUNTER — TELEPHONE (OUTPATIENT)
Dept: FAMILY MEDICINE CLINIC | Facility: CLINIC | Age: 76
End: 2021-12-14

## 2021-12-14 ENCOUNTER — OFFICE VISIT (OUTPATIENT)
Dept: FAMILY MEDICINE CLINIC | Facility: CLINIC | Age: 76
End: 2021-12-14

## 2021-12-14 VITALS
BODY MASS INDEX: 31.5 KG/M2 | SYSTOLIC BLOOD PRESSURE: 130 MMHG | DIASTOLIC BLOOD PRESSURE: 72 MMHG | HEART RATE: 76 BPM | HEIGHT: 64 IN | OXYGEN SATURATION: 96 % | WEIGHT: 184.5 LBS

## 2021-12-14 DIAGNOSIS — J45.909 REACTIVE AIRWAY DISEASE, UNSPECIFIED ASTHMA SEVERITY, UNCOMPLICATED: ICD-10-CM

## 2021-12-14 DIAGNOSIS — J01.00 ACUTE NON-RECURRENT MAXILLARY SINUSITIS: Primary | ICD-10-CM

## 2021-12-14 PROCEDURE — 99213 OFFICE O/P EST LOW 20 MIN: CPT | Performed by: NURSE PRACTITIONER

## 2021-12-14 RX ORDER — AMOXICILLIN AND CLAVULANATE POTASSIUM 875; 125 MG/1; MG/1
1 TABLET, FILM COATED ORAL EVERY 12 HOURS SCHEDULED
Qty: 14 TABLET | Refills: 0 | Status: SHIPPED | OUTPATIENT
Start: 2021-12-14 | End: 2022-01-10

## 2021-12-14 RX ORDER — ALBUTEROL SULFATE 90 UG/1
2 AEROSOL, METERED RESPIRATORY (INHALATION) EVERY 4 HOURS PRN
Qty: 8 G | Refills: 0 | Status: SHIPPED | OUTPATIENT
Start: 2021-12-14

## 2021-12-14 NOTE — TELEPHONE ENCOUNTER
Caller: Deanne Upton    Relationship: Self    Best call back number: [phone]     What is the best time to reach you: ANYTIME    Who are you requesting to speak with (clinical staff, provider,  specific staff member): CLINCAL    Do you know the name of the person who called:     What was the call regarding: PATIENT CALLING WANTING TO KNOW IF  SHE CAN STILL TAKE THE amoxicillin-clavulanate (Augmentin) 875-125 MG per tablet SHE STATED THAT ONE OF THE WARNINGS WAS IF YOU HAVE KIDNEY DISEASE TELL YOUR DR SHE DIDN'T KNOW IF THIS WAS STILL SAFE TO TAKE     Do you require a callback: YES

## 2021-12-14 NOTE — TELEPHONE ENCOUNTER
We have to be cautious with augmentin in severe kidney disease. Her kidney function is certainly more efficient and this antibiotic is safe for her to take. Thanks.

## 2021-12-14 NOTE — PROGRESS NOTES
"Chief Complaint  Facial Pain    Subjective          Deanne Upton presents to [facility]  Presents with acute sinus pain after inhaling pepper. She has chronic allergies and history of recurrent sinusitis.    Sinusitis  This is a new problem. The current episode started in the past 7 days. The problem is unchanged. There has been no fever. The pain is mild. Associated symptoms include congestion and sinus pressure. Pertinent negatives include no chills, coughing, diaphoresis, ear pain, headaches, hoarse voice, neck pain, shortness of breath, sneezing, sore throat or swollen glands. Past treatments include nothing. The treatment provided no relief.       Objective   Vital Signs:   /72   Pulse 76   Ht 162.6 cm (64\")   Wt 83.7 kg (184 lb 8 oz)   SpO2 96%   BMI 31.67 kg/m²     Physical Exam  Vitals reviewed.   HENT:      Right Ear: Tympanic membrane and ear canal normal.      Left Ear: Tympanic membrane and ear canal normal.      Nose:      Right Sinus: Maxillary sinus tenderness present. No frontal sinus tenderness.      Left Sinus: Maxillary sinus tenderness present. No frontal sinus tenderness.      Mouth/Throat:      Mouth: Mucous membranes are moist.      Pharynx: Oropharynx is clear.   Cardiovascular:      Rate and Rhythm: Normal rate and regular rhythm.      Heart sounds: No murmur heard.  No gallop.    Pulmonary:      Effort: Pulmonary effort is normal. No respiratory distress.      Breath sounds: Normal breath sounds. No wheezing, rhonchi or rales.   Skin:     General: Skin is warm and dry.   Neurological:      Mental Status: She is oriented to person, place, and time.   Psychiatric:         Mood and Affect: Mood normal.        Result Review :                 Assessment and Plan    Diagnoses and all orders for this visit:    1. Acute non-recurrent maxillary sinusitis (Primary)    2. Reactive airway disease, unspecified asthma severity, uncomplicated    Other " orders  -     amoxicillin-clavulanate (Augmentin) 875-125 MG per tablet; Take 1 tablet by mouth Every 12 (Twelve) Hours.  Dispense: 14 tablet; Refill: 0  -     albuterol sulfate  (90 Base) MCG/ACT inhaler; Inhale 2 puffs Every 4 (Four) Hours As Needed for Wheezing.  Dispense: 8 g; Refill: 0        Follow Up   No follow-ups on file.  Patient was given instructions and counseling regarding her condition or for health maintenance advice. Please see specific information pulled into the AVS if appropriate.     Sinus pain, after eating pepper, she believes pepper got in her sinus cavity, has occurred previously  She does have maxillary sinus pain start antibiotic as prescribed  Refill albuterol for asthma, not currently having episode  She will follow up for no improvement or worsening symptoms

## 2021-12-16 ENCOUNTER — TELEPHONE (OUTPATIENT)
Dept: FAMILY MEDICINE CLINIC | Facility: CLINIC | Age: 76
End: 2021-12-16

## 2021-12-16 NOTE — TELEPHONE ENCOUNTER
"PATIENT CALLING STATES SHE IS IN THE \"DOUNTHOLE\" WITH INSURANCE AND HER LISINOPRIL WILL COST HER $500 TO FILL FOR December. WANTING TO KNOW WOULD SHE BE OK NOT TAKING IT FOR A COUPLE WEEKS UNTIL THE FIRST OF THE YEAR? PLEASE CALL AND ADVISE      CALLBACK # [phone]   "

## 2021-12-17 NOTE — TELEPHONE ENCOUNTER
I don't think that is a good idea. Can you please check [facility] for this script, please?  Concerned because she is on 10 mg and it is a combination medication. This could change electrolyte balances and effect the kidney so would be better for her to take.

## 2021-12-20 RX ORDER — LISINOPRIL AND HYDROCHLOROTHIAZIDE 12.5; 1 MG/1; MG/1
1 TABLET ORAL DAILY
Qty: 90 TABLET | Refills: 3 | Status: SHIPPED | OUTPATIENT
Start: 2021-12-20 | End: 2022-02-18

## 2021-12-20 NOTE — TELEPHONE ENCOUNTER
Spoke with patient in detail. Voiced understanding. 30 day supply sending to [facility]. Routed to rachell for review.

## 2022-01-04 DIAGNOSIS — E78.1 PURE HYPERGLYCERIDEMIA: ICD-10-CM

## 2022-01-04 DIAGNOSIS — I10 ESSENTIAL HYPERTENSION: ICD-10-CM

## 2022-01-04 DIAGNOSIS — E11.69 TYPE 2 DIABETES MELLITUS WITH OTHER SPECIFIED COMPLICATION, WITHOUT LONG-TERM CURRENT USE OF INSULIN: ICD-10-CM

## 2022-01-04 DIAGNOSIS — E11.69 TYPE 2 DIABETES MELLITUS WITH OTHER SPECIFIED COMPLICATION, WITHOUT LONG-TERM CURRENT USE OF INSULIN: Primary | ICD-10-CM

## 2022-01-05 LAB
ALBUMIN SERPL-MCNC: 4.5 G/DL (ref 3.7–4.7)
ALBUMIN/GLOB SERPL: 2 {RATIO} (ref 1.2–2.2)
ALP SERPL-CCNC: 78 IU/L (ref 44–121)
ALT SERPL-CCNC: 20 IU/L (ref 0–32)
AST SERPL-CCNC: 21 IU/L (ref 0–40)
BASOPHILS # BLD AUTO: 0.1 X10E3/UL (ref 0–0.2)
BASOPHILS NFR BLD AUTO: 1 %
BILIRUB SERPL-MCNC: 0.4 MG/DL (ref 0–1.2)
BUN SERPL-MCNC: 21 MG/DL (ref 8–27)
BUN/CREAT SERPL: 18 (ref 12–28)
CALCIUM SERPL-MCNC: 9.6 MG/DL (ref 8.7–10.3)
CHLORIDE SERPL-SCNC: 103 MMOL/L (ref 96–106)
CHOLEST SERPL-MCNC: 151 MG/DL (ref 100–199)
CO2 SERPL-SCNC: 24 MMOL/L (ref 20–29)
CREAT SERPL-MCNC: 1.15 MG/DL (ref 0.57–1)
EOSINOPHIL # BLD AUTO: 0.3 X10E3/UL (ref 0–0.4)
EOSINOPHIL NFR BLD AUTO: 4 %
ERYTHROCYTE [DISTWIDTH] IN BLOOD BY AUTOMATED COUNT: 12.4 % (ref 11.7–15.4)
GLOBULIN SER CALC-MCNC: 2.2 G/DL (ref 1.5–4.5)
GLUCOSE SERPL-MCNC: 131 MG/DL (ref 65–99)
HBA1C MFR BLD: 6.1 % (ref 4.8–5.6)
HCT VFR BLD AUTO: 41.6 % (ref 34–46.6)
HDLC SERPL-MCNC: 48 MG/DL
HGB BLD-MCNC: 13.9 G/DL (ref 11.1–15.9)
IMM GRANULOCYTES # BLD AUTO: 0 X10E3/UL (ref 0–0.1)
IMM GRANULOCYTES NFR BLD AUTO: 0 %
LDLC SERPL CALC-MCNC: 76 MG/DL (ref 0–99)
LDLC/HDLC SERPL: 1.6 RATIO (ref 0–3.2)
LYMPHOCYTES # BLD AUTO: 2.5 X10E3/UL (ref 0.7–3.1)
LYMPHOCYTES NFR BLD AUTO: 39 %
MCH RBC QN AUTO: 29.8 PG (ref 26.6–33)
MCHC RBC AUTO-ENTMCNC: 33.4 G/DL (ref 31.5–35.7)
MCV RBC AUTO: 89 FL (ref 79–97)
MONOCYTES # BLD AUTO: 0.6 X10E3/UL (ref 0.1–0.9)
MONOCYTES NFR BLD AUTO: 9 %
NEUTROPHILS # BLD AUTO: 3 X10E3/UL (ref 1.4–7)
NEUTROPHILS NFR BLD AUTO: 47 %
PLATELET # BLD AUTO: 273 X10E3/UL (ref 150–450)
POTASSIUM SERPL-SCNC: 4.3 MMOL/L (ref 3.5–5.2)
PROT SERPL-MCNC: 6.7 G/DL (ref 6–8.5)
RBC # BLD AUTO: 4.67 X10E6/UL (ref 3.77–5.28)
SODIUM SERPL-SCNC: 142 MMOL/L (ref 134–144)
TRIGL SERPL-MCNC: 156 MG/DL (ref 0–149)
VLDLC SERPL CALC-MCNC: 27 MG/DL (ref 5–40)
WBC # BLD AUTO: 6.5 X10E3/UL (ref 3.4–10.8)

## 2022-01-10 ENCOUNTER — OFFICE VISIT (OUTPATIENT)
Dept: FAMILY MEDICINE CLINIC | Facility: CLINIC | Age: 77
End: 2022-01-10

## 2022-01-10 VITALS
SYSTOLIC BLOOD PRESSURE: 122 MMHG | DIASTOLIC BLOOD PRESSURE: 62 MMHG | TEMPERATURE: 97.3 F | HEIGHT: 64 IN | WEIGHT: 184.3 LBS | OXYGEN SATURATION: 97 % | HEART RATE: 78 BPM | RESPIRATION RATE: 17 BRPM | BODY MASS INDEX: 31.47 KG/M2

## 2022-01-10 DIAGNOSIS — E11.69 TYPE 2 DIABETES MELLITUS WITH OTHER SPECIFIED COMPLICATION, WITHOUT LONG-TERM CURRENT USE OF INSULIN: ICD-10-CM

## 2022-01-10 DIAGNOSIS — M17.0 PRIMARY OSTEOARTHRITIS OF BOTH KNEES: ICD-10-CM

## 2022-01-10 DIAGNOSIS — M54.81 OCCIPITAL NEURALGIA OF LEFT SIDE: ICD-10-CM

## 2022-01-10 DIAGNOSIS — Z00.00 MEDICARE ANNUAL WELLNESS VISIT, SUBSEQUENT: Primary | ICD-10-CM

## 2022-01-10 DIAGNOSIS — M47.812 CERVICAL SPONDYLOSIS WITHOUT MYELOPATHY: ICD-10-CM

## 2022-01-10 DIAGNOSIS — M79.672 LEFT FOOT PAIN: ICD-10-CM

## 2022-01-10 DIAGNOSIS — D49.2 SKIN NEOPLASM: ICD-10-CM

## 2022-01-10 DIAGNOSIS — H40.1191 PRIMARY OPEN-ANGLE GLAUCOMA, MILD STAGE, UNSPECIFIED LATERALITY: Primary | ICD-10-CM

## 2022-01-10 DIAGNOSIS — M21.619 BUNION: ICD-10-CM

## 2022-01-10 DIAGNOSIS — M50.30 DEGENERATIVE DISC DISEASE, CERVICAL: ICD-10-CM

## 2022-01-10 DIAGNOSIS — N18.30 STAGE 3 CHRONIC KIDNEY DISEASE, UNSPECIFIED WHETHER STAGE 3A OR 3B CKD: ICD-10-CM

## 2022-01-10 PROCEDURE — G0439 PPPS, SUBSEQ VISIT: HCPCS | Performed by: FAMILY MEDICINE

## 2022-01-10 PROCEDURE — 1170F FXNL STATUS ASSESSED: CPT | Performed by: FAMILY MEDICINE

## 2022-01-10 PROCEDURE — 1160F RVW MEDS BY RX/DR IN RCRD: CPT | Performed by: FAMILY MEDICINE
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
## 2022-01-28 ENCOUNTER — OFFICE VISIT (OUTPATIENT)
Dept: PAIN MEDICINE | Facility: CLINIC | Age: 77
End: 2022-01-28

## 2022-01-28 ENCOUNTER — TRANSCRIBE ORDERS (OUTPATIENT)
Dept: SURGERY | Facility: SURGERY CENTER | Age: 77
End: 2022-01-28

## 2022-01-28 ENCOUNTER — PREP FOR SURGERY (OUTPATIENT)
Dept: SURGERY | Facility: SURGERY CENTER | Age: 77
End: 2022-01-28

## 2022-01-28 VITALS
OXYGEN SATURATION: 97 % | RESPIRATION RATE: 18 BRPM | SYSTOLIC BLOOD PRESSURE: 147 MMHG | DIASTOLIC BLOOD PRESSURE: 76 MMHG | HEART RATE: 76 BPM | WEIGHT: 188 LBS | BODY MASS INDEX: 32.1 KG/M2 | HEIGHT: 64 IN | TEMPERATURE: 96 F

## 2022-01-28 DIAGNOSIS — M50.30 DDD (DEGENERATIVE DISC DISEASE), CERVICAL: Primary | ICD-10-CM

## 2022-01-28 DIAGNOSIS — M47.812 ARTHROPATHY OF CERVICAL FACET JOINT: ICD-10-CM

## 2022-01-28 DIAGNOSIS — M50.30 DEGENERATIVE DISC DISEASE, CERVICAL: ICD-10-CM

## 2022-01-28 DIAGNOSIS — M54.12 CERVICAL RADICULOPATHY: ICD-10-CM

## 2022-01-28 DIAGNOSIS — G89.29 OTHER CHRONIC PAIN: Primary | ICD-10-CM

## 2022-01-28 DIAGNOSIS — M50.30 DDD (DEGENERATIVE DISC DISEASE), CERVICAL: ICD-10-CM

## 2022-01-28 PROCEDURE — 99214 OFFICE O/P EST MOD 30 MIN: CPT | Performed by: NURSE PRACTITIONER

## 2022-01-28 RX ORDER — SODIUM CHLORIDE 0.9 % (FLUSH) 0.9 %
10 SYRINGE (ML) INJECTION EVERY 12 HOURS SCHEDULED
Status: CANCELLED | OUTPATIENT
Start: 2022-01-28

## 2022-01-28 RX ORDER — SODIUM CHLORIDE 0.9 % (FLUSH) 0.9 %
10 SYRINGE (ML) INJECTION AS NEEDED
Status: CANCELLED | OUTPATIENT
Start: 2022-01-28

## 2022-01-28 NOTE — PROGRESS NOTES
CHIEF COMPLAINT  Follow-up for neck pain.    Subjective   Deanne Upton is a 76 y.o. female  who presents for follow-up.  She has a history of neck pain.    Patient was last evaluated in our clinic on 8/19/2021.  At that point in time she reported that she had therapeutic response with cervical medial branch blocks.  She reported a new symptom of pain that radiated down the right arm.  She also had a new MRI of the cervical spine which we reviewed.  I recommended a cervical epidural steroid injection.  This procedure was canceled by the patient because of cost/insurance issues.  Today she returns and is interested in resuming interventional pain management.     Patient remained masked during entire encounter. No cough present. I donned a mask and eye protection throughout entire visit. Prior to donning mask and eye protection, hand hygiene was performed, as well as when it was doffed.  I was closer than 6 feet, but not for an extended period of time. No obvious exposure to any bodily fluids.    Neck Pain   This is a chronic problem. The current episode started more than 1 year ago. The problem occurs daily. The problem has been waxing and waning. The pain is present in the left side and right side. The quality of the pain is described as burning and shooting (bilateral shoulders, radiates down right arm into right hand). The pain is at a severity of 4/10. The symptoms are aggravated by position. Associated symptoms include weakness (bilateral arms). Pertinent negatives include no chest pain, fever, headaches or numbness. She has tried home exercises and acetaminophen for the symptoms. The treatment provided moderate relief.      PEG Assessment   What number best describes your pain on average in the past week?4  What number best describes how, during the past week, pain has interfered with your enjoyment of life?4  What number best describes how, during the past week, pain has interfered with your general  "activity?  5    The following portions of the patient's history were reviewed and updated as appropriate: allergies, current medications, past family history, past medical history, past social history, past surgical history and problem list.    Review of Systems   Constitutional: Positive for fatigue. Negative for fever.   HENT: Negative for congestion.    Eyes: Positive for visual disturbance (right eye).   Respiratory: Negative for shortness of breath.    Cardiovascular: Negative for chest pain.   Gastrointestinal: Positive for constipation.   Genitourinary: Negative for difficulty urinating.   Musculoskeletal: Positive for neck pain.   Neurological: Positive for weakness (bilateral arms). Negative for numbness and headaches.   Psychiatric/Behavioral: Negative for sleep disturbance and suicidal ideas. The patient is nervous/anxious.      I have reviewed and confirmed the accuracy of the ROS as documented by the MA/LPN/RN ROMAN Nava    Vitals:    01/28/22 1300   BP: 147/76   Pulse: 76   Resp: 18   Temp: 96 °F (35.6 °C)   SpO2: 97%   Weight: 85.3 kg (188 lb)   Height: 162.6 cm (64\")   PainSc:   4   PainLoc: Neck         Objective   Physical Exam  Vitals and nursing note reviewed.   Constitutional:       General: She is not in acute distress.     Appearance: Normal appearance. She is not ill-appearing.   Pulmonary:      Effort: Pulmonary effort is normal. No respiratory distress.   Musculoskeletal:      Cervical back: Spasms, tenderness and bony tenderness present. Pain with movement present.      Comments: +Spurling bilaterally   Skin:     General: Skin is warm and dry.   Neurological:      Mental Status: She is alert and oriented to person, place, and time.      Motor: Motor function is intact.      Gait: Gait normal.   Psychiatric:         Mood and Affect: Mood normal.         Behavior: Behavior normal.       [facility]      Assessment/Plan   Diagnoses and all orders for this visit:    1. " Other chronic pain (Primary)    2. Arthropathy of cervical facet joint    3. DDD (degenerative disc disease), cervical      --- LAKHWINDER    ---   Indications for epidural injection:  Plan is to proceed with epidural at the appropriate level.  If the patient receives significant pain reduction and improvement in function and the plan will be to repeat the epidural when the pain worsens.  If a second epidural provides at least 6 weeks of sustained improvement that includes both pain reduction and improvement in function then an epidural injection could be repeated once again at the same level.  This is a mutual decision between the clinician and the patient that includes discussions including risks and benefits in detail as well as alternative therapies.  Patient's questions were answered to their satisfaction and to their understanding.  ---    --- Follow-up for procedure        As the clinician, I personally reviewed the YURIDIA from 1/28/2022 while the patient was in the office today.    This document is intended for medical expert use only. Reading of this document by patients and/or patient's family without participating medical staff guidance may result in misinterpretation and unintended morbidity.   Any interpretation of such data is the responsibility of the patient and/or family member responsible for the patient in concert with their primary or specialist providers, not to be left for sources of online searches such as Somo, [employer] or similar queries. Relying on these approaches to knowledge may result in misinterpretation, misguided goals of care and even death should patients or family members try recommendations outside of the realm of professional medical care in a supervised way.

## 2022-02-08 ENCOUNTER — HOSPITAL ENCOUNTER (OUTPATIENT)
Dept: GENERAL RADIOLOGY | Facility: SURGERY CENTER | Age: 77
Setting detail: HOSPITAL OUTPATIENT SURGERY
End: 2022-02-08

## 2022-02-08 ENCOUNTER — HOSPITAL ENCOUNTER (OUTPATIENT)
Facility: SURGERY CENTER | Age: 77
Setting detail: HOSPITAL OUTPATIENT SURGERY
Discharge: HOME | End: 2022-02-08
Attending: ANESTHESIOLOGY | Admitting: ANESTHESIOLOGY

## 2022-02-08 VITALS
SYSTOLIC BLOOD PRESSURE: 123 MMHG | RESPIRATION RATE: 16 BRPM | OXYGEN SATURATION: 97 % | WEIGHT: 181 LBS | DIASTOLIC BLOOD PRESSURE: 65 MMHG | HEART RATE: 71 BPM | BODY MASS INDEX: 30.9 KG/M2 | HEIGHT: 64 IN | TEMPERATURE: 97.8 F

## 2022-02-08 DIAGNOSIS — M50.30 DEGENERATIVE DISC DISEASE, CERVICAL: ICD-10-CM

## 2022-02-08 DIAGNOSIS — M50.30 DDD (DEGENERATIVE DISC DISEASE), CERVICAL: ICD-10-CM

## 2022-02-08 DIAGNOSIS — M54.12 CERVICAL RADICULOPATHY: ICD-10-CM

## 2022-02-08 PROCEDURE — 62321 NJX INTERLAMINAR CRV/THRC: CPT | Performed by: ANESTHESIOLOGY

## 2022-02-08 PROCEDURE — 0 IOHEXOL 300 MG/ML SOLUTION 10 ML VIAL: Performed by: ANESTHESIOLOGY

## 2022-02-08 PROCEDURE — 76000 FLUOROSCOPY <1 HR PHYS/QHP: CPT

## 2022-02-08 PROCEDURE — 25010000002 METHYLPREDNISOLONE PER 80 MG: Performed by: ANESTHESIOLOGY

## 2022-02-08 PROCEDURE — S0260 H&P FOR SURGERY: HCPCS | Performed by: ANESTHESIOLOGY

## 2022-02-08 PROCEDURE — 3E0S3BZ INTRODUCTION OF ANESTHETIC AGENT INTO EPIDURAL SPACE, PERCUTANEOUS APPROACH: ICD-10-PCS | Performed by: NURSE PRACTITIONER

## 2022-02-08 PROCEDURE — 77002 NEEDLE LOCALIZATION BY XRAY: CPT

## 2022-02-08 RX ORDER — SODIUM CHLORIDE 0.9 % (FLUSH) 0.9 %
10 SYRINGE (ML) INJECTION EVERY 12 HOURS SCHEDULED
Status: DISCONTINUED | OUTPATIENT
Start: 2022-02-08 | End: 2022-02-08 | Stop reason: HOSPADM

## 2022-02-08 RX ORDER — SODIUM CHLORIDE 0.9 % (FLUSH) 0.9 %
10 SYRINGE (ML) INJECTION AS NEEDED
Status: DISCONTINUED | OUTPATIENT
Start: 2022-02-08 | End: 2022-02-08 | Stop reason: HOSPADM

## 2022-02-08 RX ORDER — SODIUM CHLORIDE, SODIUM LACTATE, POTASSIUM CHLORIDE, CALCIUM CHLORIDE 600; 310; 30; 20 MG/100ML; MG/100ML; MG/100ML; MG/100ML
20 INJECTION, SOLUTION INTRAVENOUS CONTINUOUS
Status: DISCONTINUED | OUTPATIENT
Start: 2022-02-08 | End: 2022-02-08 | Stop reason: HOSPADM

## 2022-02-08 RX ADMIN — SODIUM CHLORIDE, POTASSIUM CHLORIDE, SODIUM LACTATE AND CALCIUM CHLORIDE 20 ML/HR: 600; 310; 30; 20 INJECTION, SOLUTION INTRAVENOUS at 11:54

## 2022-02-08 NOTE — H&P
[facility]   HISTORY AND PHYSICAL    Patient Name: Deanne Upton  : 1945  MRN: [id number]  Primary Care Physician:  Sangeetha Urbina MD  Date of admission: 2022    Subjective   Subjective     Chief Complaint: Neck pain and right arm pain    History of Present Illness  She has a history of neck pain and cervical radiculopathy.  She has spinal stenosis that includes a moderate central stenosis at C5-6 as she has multilevel mild to moderate central stenoses also at other levels as well as multilevel neuroforaminal stenoses.  Review of Systems   No Fever, No Chills, No ear pain, No sinus pressure or drainage, No eye pain or drainage, No cough, No SOB, No chest tightness nor chest pain, no palpitations.      Personal History     Past Medical History:   Diagnosis Date   • Allergic    • Anxiety    • Asthma    • Breast asymmetry    • Diabetes mellitus type 2, controlled (HCC)    • Emphysema of lung (HCC)    • Headache    • Hyperlipemia    • Hypertension    • Injury of neck    • Neck pain    • Osteoarthritis    • Peripheral neuropathy    • Shortness of breath    • Sinusitis    • Vitamin A deficiency        Past Surgical History:   Procedure Laterality Date   • BREAST BIOPSY     • BUNIONECTOMY     • COLONOSCOPY      pt reports was normal and due    • MOUTH SURGERY     • SKIN CANCER EXCISION Left        Family History: family history includes Hypertension in her mother; Lung cancer in her father; Stroke in her mother; Thyroid disease in her mother. Otherwise pertinent FHx was reviewed and not pertinent to current issue.    Social History:  reports that she has quit smoking. She has quit using smokeless tobacco. She reports current alcohol use. She reports that she does not use drugs.    Home Medications:  B Complex + C TR, Cetirizine HCl, Krill Oil, Peak Flow Meter, Spacer/Aero Chamber Mouthpiece, Vitamin D, acetaminophen, albuterol sulfate HFA, benzonatate, bimatoprost, budesonide-formoterol,  diphenhydrAMINE-acetaminophen, lisinopril-hydrochlorothiazide, melatonin, metFORMIN, mometasone, montelukast, multivitamin, simvastatin, and triamcinolone    Allergies:  Allergies   Allergen Reactions   • Crocus Anaphylaxis   • Opium Headache   • Ibuprofen Swelling     Lips swell   • Naproxen GI Intolerance     Can't remember    • Nsaids GI Intolerance     Was having some kidney problems   • Tramadol GI Intolerance     Visual complaint (light show with eyes closes), joint pain, drowsy,bloody stool with diarrhea!!!!!!!       Objective    Objective     Vitals:   Temp:  [97 °F (36.1 °C)] 97 °F (36.1 °C)  Heart Rate:  [78] 78  Resp:  [20] 20  BP: (150)/(63) 150/63    Physical Exam  VSS, NNR, NCAT, NMNA, NRD, AAOx3.    Result Review    Result Review:  I have personally reviewed the results from the time of this admission to 2/8/2022 12:31 EST and agree with these findings:  []  Laboratory  []  Microbiology  []  Radiology  []  EKG/Telemetry   []  Cardiology/Vascular   []  Pathology  []  Old records  []  Other:  Most notable findings include: As above    Assessment/Plan   Assessment / Plan     Brief Patient Summary:  Deanne Upton is a 76 y.o. female who has a pain in the neck and some right arm radicular symptoms    Active Hospital Problems:  Active Hospital Problems    Diagnosis    • Cervical radiculopathy      Added automatically from request for surgery [id number]     • Degenerative disc disease, cervical      Plan:     Cervical epidural steroid injection      DVT prophylaxis:  No DVT prophylaxis order currently exists.    CODE STATUS:       Admission Status:  I believe this patient meets outpatient status.    Josue Reyna MD

## 2022-02-08 NOTE — OP NOTE
Cervical Epidural Steroid Injection @ C5-C6  [facility]    PREOPERATIVE DIAGNOSIS:  Cervical Spinal Stenosis and Right Cervical Radiculopathy  POSTOPERATIVE DIAGNOSIS:  Same as preop diagnosis    PROCEDURE:   Cervical Epidural Steroid Injection, Therapeutic Translaminar Injection, with epidurogram, at  C5-C6 level    PRE-PROCEDURE DISCUSSION WITH PATIENT:    Risks and complications were discussed with the patient prior to starting the procedure and informed consent was obtained.  We discussed various topics including but not limited to bleeding, infection, injury, paralysis, nerve injury, dural puncture, coma, death, worsening of clinical picture, lack of pain relief, and postprocedural soreness.    SURGEON:  Josue Reyna MD    REASON FOR PROCEDURE:   Degenerative changes are noted in the area., Stenotic area is noted, and is likely contributing to this chronic &/or recurrent pain.  and Radiating pattern of pain is likely consistent with degenerative changes in the area.    SEDATION:  Patient declined administration of moderate sedation   and , but an IV access was obtained for safety.  ANESTHETIC:  Marcaine 0.25%  STEROID:   Methylprednisolone (DEPO MEDROL) 80mg/ml    DESCRIPTON OF PROCEDURE:    After obtaining informed consent, I.V. was started in the preop area.   The patient was taken to the operating room and placed in the prone position.  EKG, blood pressure, and pulse oximeter were monitored throughout, and sedation was provided as needed by the RN under my guidance. All pressure points were well padded.  The cervicothoracic spine area was prepped with Chloraprep and draped in a sterile fashion.  Under fluoroscopic guidance, the aforementioned interlaminar space was identified. Skin and subcutaneous tissues were anesthetized with 1% lidocaine in the middle of the space. A Tuohy needle was introduced through the skin and advanced to this interlaminar space and into the epidural space  under fluoroscopic guidance and verified with loss-of-resistance technique to air.  After confirming the position of the needle with the fluoroscope with all the views, and after aspiration was confirmed negative for blood and CSF, 1.5 mL of Omnipaque was injected.  After seeing appropriate epidurogram with lateral and PA views, a total of 3 cc solution was injected, consisting of 1cc of local anesthetic as above, with normal saline and injectable steroid as above.     ESTIMATED BLOOD LOSS:  <5 mL  SPECIMENS:  None    COMPLICATIONS:     No complications were noted., There was no indication of vascular uptake on live injection of contrast dye., There was no indication of intrathecal uptake on live injection of contrast dye., There was not any evidence of dural puncture.   and The patient did not have any signs of postprocedure numbness nor weakness.    TOLERANCE & DISCHARGE CONDITION:    The patient tolerated the procedure well.  The patient was transported to the recovery area without difficulties.  The patient was discharged to home under the care of family in stable and satisfactory condition.    PLAN OF CARE:  1. The patient was given our standard instruction sheet.  2. The patient will Return to clinic 4 wks.  3. The patient will resume all medications as per the medication reconciliation sheet.

## 2022-02-08 NOTE — DISCHARGE INSTRUCTIONS
[facility] - Post-procedure Instructions          --  While there are no absolute restrictions, it is recommended that you do not perform strenuous activity today. In the morning, you may resume your level of activity as before your block.    --  If you have a band-aid at your injection site, please remove it later today. Observe the area for any redness, swelling, pus-like drainage, or a temperature over 101°. If any of these symptoms occur, please call your doctor at [phone]. If after office hours, leave a message and the on-call provider will return your call.    --  Ice may be applied to your injection site. It is recommended you avoid direct heat (heating pad; hot tub) for 1-2 days.    --  Call [facility] at [phone] if you experience persistent headache, persistent bleeding from the injection site, or severe pain not relieved by heat or oral medication.    --  Do not make important decisions today.    --  Due to the effects of the block and/or the I.V. Sedation, DO NOT drive or operate hazardous machinery for 12 hours.  Local anesthetics may cause numbness after procedure and precautions must be taken with regards to operating equipment as well as with walking, even if ambulating with assistance of another person or with an assistive device.    --  Do not drink alcohol for 12 hours.    -- You may return to work tomorrow, or as directed by your referring doctor.    --  Occasionally you may notice a slight increase in your pain after the procedure. This should start to improve within the next 24-48 hours. Radiofrequency ablation procedure pain may last 3-4 weeks.    --  It may take as long as 3-4 days before you notice a gradual improvement in your pain and/or other symptoms.    -- You may continue to take your prescribed pain medication as needed.    --  Some normal possible side effects of steroid use could include fluid retention, increased blood sugar, dull headache,  increased sweating, increased appetite, mood swings and flushing.    --  Diabetics are recommended to watch their blood glucose level closely for 24-48 hours after the injection.    --  Must stay in PACU for 20 min upon arrival and prove no leg weakness before being discharged.    --  IN THE EVENT OF A LIFE THREATENING EMERGENCY, (CHEST PAIN, BREATHING DIFFICULTIES, PARALYSIS…) YOU SHOULD GO TO YOUR NEAREST EMERGENCY ROOM.    --  You should be contacted by our office within 2-3 days to schedule follow up or next appointment date.  If not contacted within 7 days, please call the office at [phone]

## 2022-02-17 NOTE — TELEPHONE ENCOUNTER
Rx Refill Note  Requested Prescriptions     Pending Prescriptions Disp Refills   • lisinopril-hydrochlorothiazide (PRINZIDE,ZESTORETIC) 10-12.5 MG per tablet [Pharmacy Med Name: Lisinopril-hydroCHLOROthiazide 10-12.5 MG Oral Tablet] 90 tablet 3     Sig: TAKE 1 TABLET BY MOUTH  DAILY   • simvastatin (ZOCOR) 10 MG tablet [Pharmacy Med Name: Simvastatin 10 MG Oral Tablet] 90 tablet 3     Sig: TAKE 1 TABLET BY MOUTH AT  NIGHT      Last office visit with prescribing clinician: 1/10/2022      Next office visit with prescribing clinician: 7/11/2022            Yani Reyes LPN  02/17/22, 12:02 EST

## 2022-02-18 RX ORDER — SIMVASTATIN 10 MG
TABLET ORAL
Qty: 90 TABLET | Refills: 3 | Status: SHIPPED | OUTPATIENT
Start: 2022-02-18 | End: 2023-01-03 | Stop reason: SDUPTHER

## 2022-02-18 RX ORDER — LISINOPRIL AND HYDROCHLOROTHIAZIDE 12.5; 1 MG/1; MG/1
1 TABLET ORAL DAILY
Qty: 90 TABLET | Refills: 3 | Status: SHIPPED | OUTPATIENT
Start: 2022-02-18 | End: 2023-01-03 | Stop reason: SDUPTHER

## 2022-03-08 ENCOUNTER — PREP FOR SURGERY (OUTPATIENT)
Dept: SURGERY | Facility: SURGERY CENTER | Age: 77
End: 2022-03-08

## 2022-03-08 ENCOUNTER — OFFICE VISIT (OUTPATIENT)
Dept: PAIN MEDICINE | Facility: CLINIC | Age: 77
End: 2022-03-08

## 2022-03-08 ENCOUNTER — TRANSCRIBE ORDERS (OUTPATIENT)
Dept: SURGERY | Facility: SURGERY CENTER | Age: 77
End: 2022-03-08

## 2022-03-08 VITALS
RESPIRATION RATE: 18 BRPM | HEIGHT: 64 IN | DIASTOLIC BLOOD PRESSURE: 66 MMHG | BODY MASS INDEX: 31.07 KG/M2 | SYSTOLIC BLOOD PRESSURE: 115 MMHG | WEIGHT: 182 LBS | HEART RATE: 70 BPM | TEMPERATURE: 97.2 F | OXYGEN SATURATION: 99 %

## 2022-03-08 DIAGNOSIS — M50.30 DDD (DEGENERATIVE DISC DISEASE), CERVICAL: ICD-10-CM

## 2022-03-08 DIAGNOSIS — M47.812 ARTHROPATHY OF CERVICAL FACET JOINT: ICD-10-CM

## 2022-03-08 DIAGNOSIS — M47.812 ARTHROPATHY OF CERVICAL FACET JOINT: Primary | ICD-10-CM

## 2022-03-08 DIAGNOSIS — G89.29 OTHER CHRONIC PAIN: Primary | ICD-10-CM

## 2022-03-08 DIAGNOSIS — Z41.9 SURGERY, ELECTIVE: Primary | ICD-10-CM

## 2022-03-08 PROCEDURE — 99214 OFFICE O/P EST MOD 30 MIN: CPT | Performed by: NURSE PRACTITIONER

## 2022-03-08 RX ORDER — SODIUM CHLORIDE 0.9 % (FLUSH) 0.9 %
10 SYRINGE (ML) INJECTION AS NEEDED
Status: CANCELLED | OUTPATIENT
Start: 2022-03-08

## 2022-03-08 RX ORDER — SODIUM CHLORIDE 0.9 % (FLUSH) 0.9 %
10 SYRINGE (ML) INJECTION EVERY 12 HOURS SCHEDULED
Status: CANCELLED | OUTPATIENT
Start: 2022-03-08

## 2022-03-08 NOTE — PROGRESS NOTES
CHIEF COMPLAINT  Follow-up for neck pain.    Subjective   Deanne Upton is a 76 y.o. female  who presents to the office for follow-up of procedure.  She completed a Cervical Epidural Steroid Injection @ C5-C6   on  2/8/2022 performed by Dr. Reyna for management of neck pain. Patient reports some moderate relief from the procedure. It has helped the right arm symptoms. Has not helped the neck and shoulder area pain as much.      Previous therapeutic response with cervical mbb C3-C6 (100% relief for several months)    Patient remained masked during entire encounter. No cough present. I donned a mask and eye protection throughout entire visit. Prior to donning mask and eye protection, hand hygiene was performed, as well as when it was doffed.  I was closer than 6 feet, but not for an extended period of time. No obvious exposure to any bodily fluids.    Neck Pain   This is a chronic problem. The current episode started more than 1 year ago. The problem occurs daily. The problem has been waxing and waning. The pain is present in the left side and right side. The quality of the pain is described as burning and shooting (bilateral shoulders, radiates down right arm into right hand). The pain is at a severity of 6/10. The symptoms are aggravated by position. Associated symptoms include weakness (right leg). Pertinent negatives include no chest pain, fever, headaches or numbness. She has tried home exercises and acetaminophen for the symptoms. The treatment provided moderate relief.      PEG Assessment   What number best describes your pain on average in the past week?5  What number best describes how, during the past week, pain has interfered with your enjoyment of life?4  What number best describes how, during the past week, pain has interfered with your general activity?  3    Review of Pertinent Medical Data ---  MRI CERVICAL [facility]      The following portions of the patient's history were reviewed and  "updated as appropriate: allergies, current medications, past family history, past medical history, past social history, past surgical history and problem list.    Review of Systems   Constitutional: Positive for fatigue. Negative for fever.   HENT: Negative for congestion.    Eyes: Positive for visual disturbance.   Respiratory: Positive for shortness of breath.    Cardiovascular: Negative for chest pain.   Gastrointestinal: Negative for constipation and diarrhea.   Genitourinary: Negative for difficulty urinating.   Musculoskeletal: Positive for neck pain.   Neurological: Positive for weakness (right leg). Negative for numbness and headaches.   Psychiatric/Behavioral: Positive for sleep disturbance. Negative for suicidal ideas. The patient is nervous/anxious.      I have reviewed and confirmed the accuracy of the ROS as documented by the MA/LPN/RN ROMAN Nava     Vitals:    03/08/22 1037   BP: 115/66   Pulse: 70   Resp: 18   Temp: 97.2 °F (36.2 °C)   SpO2: 99%   Weight: 82.6 kg (182 lb)   Height: 162.6 cm (64\")   PainSc:   6   PainLoc: Neck         Objective   Physical Exam  Vitals and nursing note reviewed.   Constitutional:       General: She is not in acute distress.     Appearance: Normal appearance. She is not ill-appearing.   Pulmonary:      Effort: Pulmonary effort is normal. No respiratory distress.   Musculoskeletal:      Cervical back: Spasms, tenderness and bony tenderness present. Pain with movement present.      Comments: +cervical facet tenderness/loading    Skin:     General: Skin is warm and dry.   Neurological:      Mental Status: She is alert and oriented to person, place, and time.      Motor: Motor function is intact.      Gait: Gait normal.   Psychiatric:         Mood and Affect: Mood normal.         Behavior: Behavior normal.           Assessment/Plan   Diagnoses and all orders for this visit:    1. Other chronic pain (Primary)    2. Arthropathy of cervical facet joint    3. DDD " "(degenerative disc disease), cervical        --- Bilateral C3-C6 MBB (therapeutic)  -------  Education about Medial Branch Blockade and RF Therapy:    This medial branch blockade (MBB) suggested is intended for diagnostic purposes, with the intent of offering the patient Radiofrequency thermal rhizotomy (RF) if the MBB is diagnostically effective.  The diagnostic blockade is necessary to determine the likelihood that RF therapy could be efficacious in providing long term relief to the patient.    Medial branches are sensory nerve branches that connect to a facet joint and transmit sensations & pain signals from that joint.  Facet is a term for the type of joints found in the spine.  Medial branches are the nerves that go to a facet, and therefore are also sometimes called \"facet joint nerves\" (FJNs).      In a medial branch blockade procedure, xray fluoroscopy is used to verify the locations of the outside of the joint lines which are being targeted.  Under xray guidance, needles are placed to these areas.  Contrast dye is injected to confirm proper placement, with dye flowing over the joint area, and to ensure that the dye does not flow into unintended areas such as a vein.  When this is confirmed, local anesthetic is injected to block the medial branch at that joint level.      If MBBs are diagnostically successful in blocking pain, then the patient is most likely a great candidate for Radiofrequency of those facet joint nerves.  In the RF procedure, needles are placed to the joint lines in the same fashion, and after testing, the needle tips are heated to thermally treat the nerves, blocking the nerves by in essence damaging the nerves with the heat treatment(non-pulsed).       Medically, a successful RF procedure should provide a patient with 50% pain relief or more for at least 6 months.  Clinical experience suggests that successful patients receive relief more in the range of 12 months on average.  We also " discussed that a fortunate minority of patients receive therapeutic success from the MBB, and may not require RF ablation.  If a patient receives more than 8 weeks of relief from MBB, then occasional repeat MBB for therapeutic purposes is a very reasonable alternative therapy.  This course of therapy is consistent with our LCDs according to our CMS  in the area, and therefore other insurance providers should follow accordingly.  We will monitor our patients to screen for these therapeutic responders and will offer RF therapy only when necessary.      We discussed that MBB & RF are not without risks.  Guidelines regarding anticoagulant use & neuraxial procedures will be respected.  Patients that are ill or otherwise may be at risk for sepsis will not have their spines accessed by neuraxial injections of any type.  This patient will not be offered these therapies if there is an increased risk.   We discussed that there is a risk of postprocedural pain and also a risk of worsening of clinical picture with these procedures as with any neuraxial procedure.    -------    --- Follow-up for procedure          As the clinician, I personally reviewed the YURIDIA from 3/8/2022 while the patient was in the office today.    This document is intended for medical expert use only. Reading of this document by patients and/or patient's family without participating medical staff guidance may result in misinterpretation and unintended morbidity.   Any interpretation of such data is the responsibility of the patient and/or family member responsible for the patient in concert with their primary or specialist providers, not to be left for sources of online searches such as [employer], [employer] or similar queries. Relying on these approaches to knowledge may result in misinterpretation, misguided goals of care and even death should patients or family members try recommendations outside of the realm of professional medical care in a  supervised way.

## 2022-03-08 NOTE — H&P (VIEW-ONLY)
CHIEF COMPLAINT  Follow-up for neck pain.    Subjective   Deanne Upton is a 76 y.o. female  who presents to the office for follow-up of procedure.  She completed a Cervical Epidural Steroid Injection @ C5-C6   on  2/8/2022 performed by Dr. Reyna for management of neck pain. Patient reports some moderate relief from the procedure. It has helped the right arm symptoms. Has not helped the neck and shoulder area pain as much.      Previous therapeutic response with cervical mbb C3-C6 (100% relief for several months)    Patient remained masked during entire encounter. No cough present. I donned a mask and eye protection throughout entire visit. Prior to donning mask and eye protection, hand hygiene was performed, as well as when it was doffed.  I was closer than 6 feet, but not for an extended period of time. No obvious exposure to any bodily fluids.    Neck Pain   This is a chronic problem. The current episode started more than 1 year ago. The problem occurs daily. The problem has been waxing and waning. The pain is present in the left side and right side. The quality of the pain is described as burning and shooting (bilateral shoulders, radiates down right arm into right hand). The pain is at a severity of 6/10. The symptoms are aggravated by position. Associated symptoms include weakness (right leg). Pertinent negatives include no chest pain, fever, headaches or numbness. She has tried home exercises and acetaminophen for the symptoms. The treatment provided moderate relief.      PEG Assessment   What number best describes your pain on average in the past week?5  What number best describes how, during the past week, pain has interfered with your enjoyment of life?4  What number best describes how, during the past week, pain has interfered with your general activity?  3    Review of Pertinent Medical Data ---  MRI CERVICAL [facility]      The following portions of the patient's history were reviewed and  "updated as appropriate: allergies, current medications, past family history, past medical history, past social history, past surgical history and problem list.    Review of Systems   Constitutional: Positive for fatigue. Negative for fever.   HENT: Negative for congestion.    Eyes: Positive for visual disturbance.   Respiratory: Positive for shortness of breath.    Cardiovascular: Negative for chest pain.   Gastrointestinal: Negative for constipation and diarrhea.   Genitourinary: Negative for difficulty urinating.   Musculoskeletal: Positive for neck pain.   Neurological: Positive for weakness (right leg). Negative for numbness and headaches.   Psychiatric/Behavioral: Positive for sleep disturbance. Negative for suicidal ideas. The patient is nervous/anxious.      I have reviewed and confirmed the accuracy of the ROS as documented by the MA/LPN/RN ROMAN Nava     Vitals:    03/08/22 1037   BP: 115/66   Pulse: 70   Resp: 18   Temp: 97.2 °F (36.2 °C)   SpO2: 99%   Weight: 82.6 kg (182 lb)   Height: 162.6 cm (64\")   PainSc:   6   PainLoc: Neck         Objective   Physical Exam  Vitals and nursing note reviewed.   Constitutional:       General: She is not in acute distress.     Appearance: Normal appearance. She is not ill-appearing.   Pulmonary:      Effort: Pulmonary effort is normal. No respiratory distress.   Musculoskeletal:      Cervical back: Spasms, tenderness and bony tenderness present. Pain with movement present.      Comments: +cervical facet tenderness/loading    Skin:     General: Skin is warm and dry.   Neurological:      Mental Status: She is alert and oriented to person, place, and time.      Motor: Motor function is intact.      Gait: Gait normal.   Psychiatric:         Mood and Affect: Mood normal.         Behavior: Behavior normal.           Assessment/Plan   Diagnoses and all orders for this visit:    1. Other chronic pain (Primary)    2. Arthropathy of cervical facet joint    3. DDD " "(degenerative disc disease), cervical        --- Bilateral C3-C6 MBB (therapeutic)  -------  Education about Medial Branch Blockade and RF Therapy:    This medial branch blockade (MBB) suggested is intended for diagnostic purposes, with the intent of offering the patient Radiofrequency thermal rhizotomy (RF) if the MBB is diagnostically effective.  The diagnostic blockade is necessary to determine the likelihood that RF therapy could be efficacious in providing long term relief to the patient.    Medial branches are sensory nerve branches that connect to a facet joint and transmit sensations & pain signals from that joint.  Facet is a term for the type of joints found in the spine.  Medial branches are the nerves that go to a facet, and therefore are also sometimes called \"facet joint nerves\" (FJNs).      In a medial branch blockade procedure, xray fluoroscopy is used to verify the locations of the outside of the joint lines which are being targeted.  Under xray guidance, needles are placed to these areas.  Contrast dye is injected to confirm proper placement, with dye flowing over the joint area, and to ensure that the dye does not flow into unintended areas such as a vein.  When this is confirmed, local anesthetic is injected to block the medial branch at that joint level.      If MBBs are diagnostically successful in blocking pain, then the patient is most likely a great candidate for Radiofrequency of those facet joint nerves.  In the RF procedure, needles are placed to the joint lines in the same fashion, and after testing, the needle tips are heated to thermally treat the nerves, blocking the nerves by in essence damaging the nerves with the heat treatment(non-pulsed).       Medically, a successful RF procedure should provide a patient with 50% pain relief or more for at least 6 months.  Clinical experience suggests that successful patients receive relief more in the range of 12 months on average.  We also " discussed that a fortunate minority of patients receive therapeutic success from the MBB, and may not require RF ablation.  If a patient receives more than 8 weeks of relief from MBB, then occasional repeat MBB for therapeutic purposes is a very reasonable alternative therapy.  This course of therapy is consistent with our LCDs according to our CMS  in the area, and therefore other insurance providers should follow accordingly.  We will monitor our patients to screen for these therapeutic responders and will offer RF therapy only when necessary.      We discussed that MBB & RF are not without risks.  Guidelines regarding anticoagulant use & neuraxial procedures will be respected.  Patients that are ill or otherwise may be at risk for sepsis will not have their spines accessed by neuraxial injections of any type.  This patient will not be offered these therapies if there is an increased risk.   We discussed that there is a risk of postprocedural pain and also a risk of worsening of clinical picture with these procedures as with any neuraxial procedure.    -------    --- Follow-up for procedure          As the clinician, I personally reviewed the YURIDIA from 3/8/2022 while the patient was in the office today.    This document is intended for medical expert use only. Reading of this document by patients and/or patient's family without participating medical staff guidance may result in misinterpretation and unintended morbidity.   Any interpretation of such data is the responsibility of the patient and/or family member responsible for the patient in concert with their primary or specialist providers, not to be left for sources of online searches such as [employer], [employer] or similar queries. Relying on these approaches to knowledge may result in misinterpretation, misguided goals of care and even death should patients or family members try recommendations outside of the realm of professional medical care in a  supervised way.

## 2022-03-21 ENCOUNTER — CLINICAL SUPPORT (OUTPATIENT)
Dept: ORTHOPEDIC SURGERY | Facility: CLINIC | Age: 77
End: 2022-03-21

## 2022-03-21 VITALS — TEMPERATURE: 96.2 F | WEIGHT: 177 LBS | BODY MASS INDEX: 31.36 KG/M2 | HEIGHT: 63 IN

## 2022-03-21 DIAGNOSIS — M17.0 BILATERAL PRIMARY OSTEOARTHRITIS OF KNEE: Primary | ICD-10-CM

## 2022-03-21 PROCEDURE — 20610 DRAIN/INJ JOINT/BURSA W/O US: CPT | Performed by: NURSE PRACTITIONER

## 2022-03-21 RX ORDER — LIDOCAINE HYDROCHLORIDE 20 MG/ML
2 INJECTION, SOLUTION EPIDURAL; INFILTRATION; INTRACAUDAL; PERINEURAL
Status: COMPLETED | OUTPATIENT
Start: 2022-03-21 | End: 2022-03-21

## 2022-03-21 RX ADMIN — LIDOCAINE HYDROCHLORIDE 2 ML: 20 INJECTION, SOLUTION EPIDURAL; INFILTRATION; INTRACAUDAL; PERINEURAL at 09:54

## 2022-03-21 RX ADMIN — LIDOCAINE HYDROCHLORIDE 2 ML: 20 INJECTION, SOLUTION EPIDURAL; INFILTRATION; INTRACAUDAL; PERINEURAL at 09:56

## 2022-03-21 NOTE — PROGRESS NOTES
3/21/2022    Deanne Upton is here today for worsening knee pain. Pt has undergone injection of the knee in the past with good resolution of symptoms. Pt is requesting a repeat injection.     KNEE Injection Procedure Note:    Large Joint Arthrocentesis: R knee  Date/Time: 3/21/2022 9:54 AM  Consent given by: patient  Site marked: site marked  Timeout: Immediately prior to procedure a time out was called to verify the correct patient, procedure, equipment, support staff and site/side marked as required   Supporting Documentation  Indications: pain   Procedure Details  Location: knee - R knee  Needle gauge: 21G.  Approach: anteromedial  Medications administered: 88 mg Hyaluronan 88 MG/4ML; 2 mL lidocaine PF 2% 2 %      Large Joint Arthrocentesis: L knee  Date/Time: 3/21/2022 9:56 AM  Consent given by: patient  Site marked: site marked  Timeout: Immediately prior to procedure a time out was called to verify the correct patient, procedure, equipment, support staff and site/side marked as required   Supporting Documentation  Indications: pain   Procedure Details  Location: knee - L knee  Preparation: Patient was prepped and draped in the usual sterile fashion  Needle gauge: 21G.  Approach: anteromedial  Medications administered: 88 mg Hyaluronan 88 MG/4ML; 2 mL lidocaine PF 2% 2 %  Patient tolerance: patient tolerated the procedure well with no immediate complications          At the conclusion of the injection I discussed the importance of continued quad strengthening exercises on a daily basis. I will see the patient back in 3 months either for cortisone injections or potentially discuss surgery.  Patient I had extensive discussion on the importance of physical therapy, she was referred to outpatient physical therapy and she understands that she would need to do that before we could proceed with surgery    Tianna Hale, APRN  3/21/2022

## 2022-03-24 ENCOUNTER — HOSPITAL ENCOUNTER (OUTPATIENT)
Facility: SURGERY CENTER | Age: 77
Setting detail: HOSPITAL OUTPATIENT SURGERY
Discharge: HOME | End: 2022-03-24
Attending: ANESTHESIOLOGY | Admitting: ANESTHESIOLOGY

## 2022-03-24 ENCOUNTER — HOSPITAL ENCOUNTER (OUTPATIENT)
Dept: GENERAL RADIOLOGY | Facility: SURGERY CENTER | Age: 77
Setting detail: HOSPITAL OUTPATIENT SURGERY
End: 2022-03-24

## 2022-03-24 VITALS
RESPIRATION RATE: 16 BRPM | HEART RATE: 74 BPM | DIASTOLIC BLOOD PRESSURE: 112 MMHG | TEMPERATURE: 98.6 F | HEIGHT: 64 IN | OXYGEN SATURATION: 98 % | SYSTOLIC BLOOD PRESSURE: 141 MMHG | BODY MASS INDEX: 31.07 KG/M2 | WEIGHT: 182 LBS

## 2022-03-24 DIAGNOSIS — Z41.9 SURGERY, ELECTIVE: ICD-10-CM

## 2022-03-24 DIAGNOSIS — M47.812 ARTHROPATHY OF CERVICAL FACET JOINT: ICD-10-CM

## 2022-03-24 PROCEDURE — 64492 INJ PARAVERT F JNT C/T 3 LEV: CPT | Performed by: ANESTHESIOLOGY

## 2022-03-24 PROCEDURE — 64491 INJ PARAVERT F JNT C/T 2 LEV: CPT | Performed by: ANESTHESIOLOGY

## 2022-03-24 PROCEDURE — 76000 FLUOROSCOPY <1 HR PHYS/QHP: CPT

## 2022-03-24 PROCEDURE — 64490 INJ PARAVERT F JNT C/T 1 LEV: CPT | Performed by: ANESTHESIOLOGY

## 2022-03-24 PROCEDURE — 25010000002 DEXAMETHASONE SODIUM PHOSPHATE 100 MG/10ML SOLUTION 10 ML VIAL: Performed by: ANESTHESIOLOGY

## 2022-03-24 PROCEDURE — 0 IOHEXOL 300 MG/ML SOLUTION 10 ML VIAL: Performed by: ANESTHESIOLOGY

## 2022-03-24 PROCEDURE — 77002 NEEDLE LOCALIZATION BY XRAY: CPT

## 2022-03-24 RX ORDER — SODIUM CHLORIDE 0.9 % (FLUSH) 0.9 %
10 SYRINGE (ML) INJECTION EVERY 12 HOURS SCHEDULED
Status: DISCONTINUED | OUTPATIENT
Start: 2022-03-24 | End: 2022-03-24 | Stop reason: HOSPADM

## 2022-03-24 RX ORDER — SODIUM CHLORIDE 0.9 % (FLUSH) 0.9 %
10 SYRINGE (ML) INJECTION AS NEEDED
Status: DISCONTINUED | OUTPATIENT
Start: 2022-03-24 | End: 2022-03-24 | Stop reason: HOSPADM

## 2022-03-24 NOTE — DISCHARGE INSTRUCTIONS
[facility] - Post-procedure Instructions          --  While there are no absolute restrictions, it is recommended that you do not perform strenuous activity today. In the morning, you may resume your level of activity as before your block.    --  If you have a band-aid at your injection site, please remove it later today. Observe the area for any redness, swelling, pus-like drainage, or a temperature over 101°. If any of these symptoms occur, please call your doctor at [phone]. If after office hours, leave a message and the on-call provider will return your call.    --  Ice may be applied to your injection site. It is recommended you avoid direct heat (heating pad; hot tub) for 1-2 days.    --  Call [facility] at [phone] if you experience persistent headache, persistent bleeding from the injection site, or severe pain not relieved by heat or oral medication.    --  Do not make important decisions today.    --  Due to the effects of the block and/or the I.V. Sedation, DO NOT drive or operate hazardous machinery for 12 hours.  Local anesthetics may cause numbness after procedure and precautions must be taken with regards to operating equipment as well as with walking, even if ambulating with assistance of another person or with an assistive device.    --  Do not drink alcohol for 12 hours.    -- You may return to work tomorrow, or as directed by your referring doctor.    --  Occasionally you may notice a slight increase in your pain after the procedure. This should start to improve within the next 24-48 hours. Radiofrequency ablation procedure pain may last 3-4 weeks.    --  It may take as long as 3-4 days before you notice a gradual improvement in your pain and/or other symptoms.    -- You may continue to take your prescribed pain medication as needed.    --  Some normal possible side effects of steroid use could include fluid retention, increased blood sugar, dull headache,  increased sweating, increased appetite, mood swings and flushing.    --  Diabetics are recommended to watch their blood glucose level closely for 24-48 hours after the injection.    --  Must stay in PACU for 20 min upon arrival and prove no leg weakness before being discharged.    --  IN THE EVENT OF A LIFE THREATENING EMERGENCY, (CHEST PAIN, BREATHING DIFFICULTIES, PARALYSIS…) YOU SHOULD GO TO YOUR NEAREST EMERGENCY ROOM.    --  You should be contacted by our office within 2-3 days to schedule follow up or next appointment date.  If not contacted within 7 days, please call the office at [phone]

## 2022-03-24 NOTE — OP NOTE
Bilateral C3-6 Cervical Medial Branch Blockade  [facility]      PREOPERATIVE DIAGNOSIS:  Cervical spondylosis without myelopathy   POSTOPERATIVE DIAGNOSIS:  Same as preoperative diagnosis    PROCEDURE:    Cervical Facet Nerve (medial branch) Blocks, with Fluoroscopy:  LEVELS C3, C4, C5, and C6, to block facet joints C3-4, C4-5, and C5-6 bilaterally  • 20022-71  - Bilateral Cervical Facet blocks, 1st level  • [id number]  - Bilateral Cervical Facet blocks, 2nd level  • [id number]  - Bilateral Cervical Facet blocks, 3rd level    PRE-PROCEDURE DISCUSSION WITH PATIENT:    Risks and complications were discussed with the patient prior to starting the procedure and informed consent was obtained.    SURGEON:  Josue Reyna MD    REASON FOR PROCEDURE:    The patient complains of pain that seems to have a significant axial component Previous clinically significant therapeutic effect after prior Radiofrequency procedure Tenderness of the affected facet joints on exam under fluoroscopy    SEDATION:  Patient declined administration of moderate sedation   and , but an IV access was obtained for safety.  ANESTHETIC:   Marcaine 0.5%  STEROID:  Dexamethasone 8mg  TOTAL VOLUME OF SOLUTION:  8 mL    DESCRIPTON OF PROCEDURE:  After obtaining informed consent, the patient was placed in the prone position. IV access was obtained.  EKG, blood pressure, and pulse oximeter were monitored and all sedation was administered by an RN under my direct guidance.  The cervical area was prepped with Chloraprep and draped with sterile barrier. Under fluoroscopic guidance the waists of the C3 through C6 vertebra on each side were identified. Skin and subcutaneous tissue was then anesthetized with 1% lidocaine 1mL at each point. Then spinal needles were introduced under fluoroscopic guidance at the waist of these vertebra on one side. After confirming the position of the needle under fluoroscopic PA and lateral views, and confirming  negative aspiration of blood and CSF, 0.25 mL of Omnipaque was injected.  Proper spread and lack of vascular uptake was demonstrated.  A solution was prepared as above, and 1 mL of that solution was injected very slowly each level.   In similar fashion, this procedure was repeated at the same levels on the contralateral side.  Needles were removed intact from all levels.  Vitals were stable throughout.     ESTIMATED BLOOD LOSS:  minimal  SPECIMENS:  None    COMPLICATIONS:    No complications were noted., There was no indication of vascular uptake on live injection of contrast dye., There was no indication of intrathecal uptake on live injection of contrast dye., There was not any evidence of dural puncture.   and The patient did not have any signs of postprocedure numbness nor weakness.    TOLERANCE & DISCHARGE CONDITION:    The patient tolerated the procedure well.  The patient was transported to the recovery area without difficulties.  The patient was discharged to home under the care of family in stable and satisfactory condition.  The patient did notice improvement in pain on extension and rotation of the cervical spine.    PLAN OF CARE:  1. The patient was given our standard instruction sheet.  2. We discussed that Cervical Medial Branch Blockade is a diagnostic procedure in consideration for radiofrequency ablation if two diagnostic procedures proved to be positive for significant benefit.  If sustained relief of six to eight weeks is obtained, then an alternative plan could be therapeutic cervical medial branch blocks on an as-needed basis.  3. The patient is asked to keep a pain log hourly for 8 hours postoperatively today.  4. The patient will Return to clinic 2 wks.  5. The patient will resume all medications as per the medication reconciliation sheet.

## 2022-04-04 ENCOUNTER — OFFICE VISIT (OUTPATIENT)
Dept: FAMILY MEDICINE CLINIC | Facility: CLINIC | Age: 77
End: 2022-04-04

## 2022-04-04 VITALS
SYSTOLIC BLOOD PRESSURE: 147 MMHG | HEART RATE: 104 BPM | DIASTOLIC BLOOD PRESSURE: 65 MMHG | WEIGHT: 192.1 LBS | BODY MASS INDEX: 32.8 KG/M2 | HEIGHT: 64 IN | OXYGEN SATURATION: 96 % | TEMPERATURE: 96.8 F

## 2022-04-04 DIAGNOSIS — J30.1 SEASONAL ALLERGIC RHINITIS DUE TO POLLEN: Primary | ICD-10-CM

## 2022-04-04 PROCEDURE — 99213 OFFICE O/P EST LOW 20 MIN: CPT | Performed by: NURSE PRACTITIONER

## 2022-04-04 RX ORDER — BENZONATATE 200 MG/1
200 CAPSULE ORAL 3 TIMES DAILY PRN
Qty: 30 CAPSULE | Refills: 0 | Status: SHIPPED | OUTPATIENT
Start: 2022-04-04 | End: 2022-10-28 | Stop reason: SDUPTHER

## 2022-04-04 NOTE — PROGRESS NOTES
"Chief Complaint  Nasal Congestion (C/o nasal congestion, sob, mild cough due to drainage, x1day)    Subjective          Deanne Upton presents to [facility]  History of Present Illness new pt to me.  Here for 1 day of cough with green sputum, sinus drainage, congestion, rhinorrhea, itchy eyes.  Just does not feel that well. She takes allergy meds for chronic allergies.  Denies fever, chills, sick exposures.     Objective   Vital Signs:   /65   Pulse 104   Temp 96.8 °F (36 °C)   Ht 162.6 cm (64\")   Wt 87.1 kg (192 lb 1.6 oz)   SpO2 96%   BMI 32.97 kg/m²            Physical Exam  Vitals and nursing note reviewed.   Constitutional:       General: She is not in acute distress.     Appearance: She is well-developed. She is not diaphoretic.   HENT:      Head: Normocephalic and atraumatic.      Right Ear: Ear canal and external ear normal.      Left Ear: Ear canal and external ear normal.      Ears:      Comments: B/l TMs dull, no erythema     Mouth/Throat:      Mouth: Mucous membranes are moist.      Pharynx: Posterior oropharyngeal erythema present. No oropharyngeal exudate.   Eyes:      General: No scleral icterus.        Right eye: No discharge.         Left eye: No discharge.      Conjunctiva/sclera: Conjunctivae normal.   Cardiovascular:      Rate and Rhythm: Normal rate and regular rhythm.      Heart sounds: Normal heart sounds.   Pulmonary:      Effort: Pulmonary effort is normal.      Breath sounds: Normal breath sounds. No wheezing or rales.   Abdominal:      General: Bowel sounds are normal.      Palpations: Abdomen is soft.      Tenderness: There is no abdominal tenderness.   Musculoskeletal:         General: No deformity.      Cervical back: Neck supple.      Comments: Gait smooth and steady   Lymphadenopathy:      Cervical: No cervical adenopathy.   Skin:     General: Skin is warm and dry.   Neurological:      General: No focal deficit present.      Mental Status: " She is alert and oriented to person, place, and time.   Psychiatric:         Mood and Affect: Mood normal.         Behavior: Behavior normal.        Result Review :                 Assessment and Plan    Diagnoses and all orders for this visit:    1. Seasonal allergic rhinitis due to pollen (Primary)  -     benzonatate (TESSALON) 200 MG capsule; Take 1 capsule by mouth 3 (Three) Times a Day As Needed for Cough.  Dispense: 30 capsule; Refill: 0      I dont think an abx needed at this point.  Cough is main concern.  Will try tessalon.  If not helpful for cough, if she is worsening or not improving over the next couple days will let me know.          Follow Up   No follow-ups on file.  Patient was given instructions and counseling regarding her condition or for health maintenance advice. Please see specific information pulled into the AVS if appropriate.

## 2022-04-08 ENCOUNTER — OFFICE VISIT (OUTPATIENT)
Dept: PAIN MEDICINE | Facility: CLINIC | Age: 77
End: 2022-04-08

## 2022-04-08 VITALS
SYSTOLIC BLOOD PRESSURE: 139 MMHG | OXYGEN SATURATION: 99 % | BODY MASS INDEX: 32.4 KG/M2 | WEIGHT: 189.8 LBS | DIASTOLIC BLOOD PRESSURE: 71 MMHG | TEMPERATURE: 96.9 F | HEIGHT: 64 IN | HEART RATE: 78 BPM | RESPIRATION RATE: 18 BRPM

## 2022-04-08 DIAGNOSIS — M50.30 DDD (DEGENERATIVE DISC DISEASE), CERVICAL: ICD-10-CM

## 2022-04-08 DIAGNOSIS — G89.29 OTHER CHRONIC PAIN: Primary | ICD-10-CM

## 2022-04-08 DIAGNOSIS — M47.812 ARTHROPATHY OF CERVICAL FACET JOINT: ICD-10-CM

## 2022-04-08 PROCEDURE — 99212 OFFICE O/P EST SF 10 MIN: CPT | Performed by: NURSE PRACTITIONER

## 2022-04-08 NOTE — PROGRESS NOTES
CHIEF COMPLAINT  Follow-up for neck pain.    Subjective   Deanne Upton is a 76 y.o. female  who presents to the office for follow-up of procedure.  She completed a Bilateral C3-6 Cervical Medial Branch Blockade   on  3/24/2022 performed by Dr. Reyna for management of neck pain. Patient reports 90% ongoing relief from the procedure.     Cervical Epidural Steroid Injection @ C5-C6   on  2/8/2022 - helped radicular complaints only      Patient remained masked during entire encounter. No cough present. I donned a mask and eye protection throughout entire visit. Prior to donning mask and eye protection, hand hygiene was performed, as well as when it was doffed.  I was closer than 6 feet, but not for an extended period of time. No obvious exposure to any bodily fluids.    Neck Pain   This is a chronic problem. The current episode started more than 1 year ago. The problem occurs daily. The problem has been waxing and waning. The pain is present in the left side and right side. The quality of the pain is described as burning and shooting (bilateral shoulders, radiates down right arm into right hand). The pain is at a severity of 2/10. The pain is mild. The symptoms are aggravated by position. Associated symptoms include weakness (bilateral knees). Pertinent negatives include no chest pain, fever, headaches or numbness. She has tried home exercises and acetaminophen for the symptoms. The treatment provided moderate relief.      PEG Assessment   What number best describes your pain on average in the past week?2  What number best describes how, during the past week, pain has interfered with your enjoyment of life?0  What number best describes how, during the past week, pain has interfered with your general activity?  0      The following portions of the patient's history were reviewed and updated as appropriate: allergies, current medications, past family history, past medical history, past social history, past surgical  "history and problem list.    Review of Systems   Constitutional: Positive for fatigue. Negative for fever.   HENT: Positive for hearing loss.    Eyes: Positive for visual disturbance.   Respiratory: Positive for shortness of breath and wheezing. Negative for cough.    Cardiovascular: Negative for chest pain.   Gastrointestinal: Negative for constipation and diarrhea.   Genitourinary: Negative for difficulty urinating.   Musculoskeletal: Positive for neck pain.   Neurological: Positive for weakness (bilateral knees). Negative for numbness and headaches.   Psychiatric/Behavioral: Positive for sleep disturbance. Negative for suicidal ideas. The patient is nervous/anxious.      I have reviewed and confirmed the accuracy of the ROS as documented by the MA/LPN/RN ROMAN Nava     Vitals:    04/08/22 1040   BP: 139/71   Pulse: 78   Resp: 18   Temp: 96.9 °F (36.1 °C)   SpO2: 99%   Weight: 86.1 kg (189 lb 12.8 oz)   Height: 162.6 cm (64\")   PainSc:   2   PainLoc: Neck       Objective   Physical Exam  Vitals and nursing note reviewed.   Constitutional:       General: She is not in acute distress.     Appearance: Normal appearance. She is not ill-appearing.   Pulmonary:      Effort: Pulmonary effort is normal. No respiratory distress.   Musculoskeletal:      Cervical back: Spasms, tenderness and bony tenderness present. Pain with movement present.      Comments: +cervical facet tenderness/loading    Skin:     General: Skin is warm and dry.   Neurological:      Mental Status: She is alert and oriented to person, place, and time.      Motor: Motor function is intact.      Gait: Gait normal.   Psychiatric:         Mood and Affect: Mood normal.         Behavior: Behavior normal.         Assessment/Plan   Diagnoses and all orders for this visit:    1. Other chronic pain (Primary)    2. Arthropathy of cervical facet joint    3. DDD (degenerative disc disease), cervical        --- Repeat injections PRN  --- She is going to " continue with HEP for her shoulders  --- Follow-up 2-3 months/sooner PRN           This document is intended for medical expert use only. Reading of this document by patients and/or patient's family without participating medical staff guidance may result in misinterpretation and unintended morbidity.   Any interpretation of such data is the responsibility of the patient and/or family member responsible for the patient in concert with their primary or specialist providers, not to be left for sources of online searches such as [employer], [employer] or similar queries. Relying on these approaches to knowledge may result in misinterpretation, misguided goals of care and even death should patients or family members try recommendations outside of the realm of professional medical care in a supervised way.

## 2022-04-11 ENCOUNTER — TELEPHONE (OUTPATIENT)
Dept: FAMILY MEDICINE CLINIC | Facility: CLINIC | Age: 77
End: 2022-04-11

## 2022-04-11 NOTE — TELEPHONE ENCOUNTER
Caller: Won Deanne GODFREY    Relationship: Self    Best call back number: [phone]*    What is the best time to reach you: ANYTIME    Who are you requesting to speak with (clinical staff, provider,  specific staff member): CLINICAL    What was the call regarding: PATIENT STATES THAT SHE WENT TO [facility] ON 4/9, AND WAS TREATED FOR EAR INFECTION, COUGH, AND CHEST CONGESTION. THE PATIENT STATES SHE WAS PRESCRIBED DOXYCYCLINE AND PREDNISONE, BUT AFTER TAKING JUST ONE DOSE OF BOTH MEDICATIONS, THE PATIENT DEVELOPED SEVERE DIARRHEA. THE PATIENT STATES THAT SHE TOOK 11 IMODIUM, AND DIARRHEA STOPPED THIS MORNING. THE PATIENT STATES THAT SHE SLEPT 12 HOURS ONE DAY, AND 15 HOURS ANOTHER DAY, AND FEELS THAT THE REST HAS HELPED HER AND DOES NOT THINK SHE NEEDS ANY MEDICATION. THE PATIENT STATES THAT SHE IS NOT HAVING ANY EAR PAIN, SHE STATES SHE DOES HAVE A COUGH, BUT IS TAKING BENZONATATE FOR THE COUGH. THE PATIENT JUST WANTED TO LET DR. GUZMÁN KNOW WHAT IS GOING ON.    Do you require a callback: NOT UNLESS DR. GUZMÁN THINKS SHE NEEDS TO TAKE ANOTHER MEDICATION.

## 2022-05-18 ENCOUNTER — TELEPHONE (OUTPATIENT)
Dept: FAMILY MEDICINE CLINIC | Facility: CLINIC | Age: 77
End: 2022-05-18

## 2022-05-18 NOTE — TELEPHONE ENCOUNTER
Caller: Deanne Upton    Relationship to patient: Self    Best call back number: [phone]    Patient is needing: PATIENT WAS EXPOSED TO COVID ON Friday NIGHT.  SHE HAS ASTHMA AND IS CONCERNED.  PLEASE CALL AND ADVISE WHAT SHE SHOULD DO

## 2022-05-19 NOTE — TELEPHONE ENCOUNTER
Spoke with patient in detail. States she has was in contact with an person that tested positive on Monday. Contact was Friday before. Pt was not wearing a mask but had 2-3 ft distance. She is not having any symptoms at this time. Is quarantining. Pt was boosted on Tuesday, advised her that she will not have coverage from that vaccine until about 2 wks after the shot was given. Pt has not tested herself again. Please advise.

## 2022-05-23 ENCOUNTER — OFFICE VISIT (OUTPATIENT)
Dept: FAMILY MEDICINE CLINIC | Facility: CLINIC | Age: 77
End: 2022-05-23

## 2022-05-23 VITALS
TEMPERATURE: 97.8 F | SYSTOLIC BLOOD PRESSURE: 119 MMHG | WEIGHT: 186.1 LBS | HEIGHT: 64 IN | HEART RATE: 92 BPM | BODY MASS INDEX: 31.77 KG/M2 | DIASTOLIC BLOOD PRESSURE: 67 MMHG | OXYGEN SATURATION: 95 %

## 2022-05-23 DIAGNOSIS — R10.30 LOWER ABDOMINAL PAIN: Primary | ICD-10-CM

## 2022-05-23 DIAGNOSIS — R35.0 URINARY FREQUENCY: ICD-10-CM

## 2022-05-23 LAB
BILIRUB BLD-MCNC: NEGATIVE MG/DL
CLARITY, POC: CLEAR
COLOR UR: YELLOW
EXPIRATION DATE: NORMAL
GLUCOSE UR STRIP-MCNC: NEGATIVE MG/DL
KETONES UR QL: NEGATIVE
LEUKOCYTE EST, POC: NEGATIVE
Lab: NORMAL
NITRITE UR-MCNC: NEGATIVE MG/ML
PH UR: 6 [PH] (ref 5–8)
PROT UR STRIP-MCNC: NEGATIVE MG/DL
RBC # UR STRIP: NEGATIVE /UL
SP GR UR: 1.01 (ref 1–1.03)
UROBILINOGEN UR QL: NORMAL

## 2022-05-23 PROCEDURE — 99213 OFFICE O/P EST LOW 20 MIN: CPT | Performed by: NURSE PRACTITIONER

## 2022-05-23 PROCEDURE — 81003 URINALYSIS AUTO W/O SCOPE: CPT | Performed by: NURSE PRACTITIONER

## 2022-05-23 NOTE — TELEPHONE ENCOUNTER
I was unable to reach pt last week. I sent her a [employer] message but she has not read it. Please call her.

## 2022-05-26 LAB
BACTERIA UR CULT: ABNORMAL
BACTERIA UR CULT: ABNORMAL

## 2022-06-01 ENCOUNTER — OFFICE VISIT (OUTPATIENT)
Dept: PAIN MEDICINE | Facility: CLINIC | Age: 77
End: 2022-06-01

## 2022-06-01 ENCOUNTER — TELEPHONE (OUTPATIENT)
Dept: FAMILY MEDICINE CLINIC | Facility: CLINIC | Age: 77
End: 2022-06-01

## 2022-06-01 VITALS
TEMPERATURE: 96 F | OXYGEN SATURATION: 94 % | HEIGHT: 64 IN | BODY MASS INDEX: 32.13 KG/M2 | WEIGHT: 188.2 LBS | HEART RATE: 90 BPM | DIASTOLIC BLOOD PRESSURE: 79 MMHG | SYSTOLIC BLOOD PRESSURE: 142 MMHG

## 2022-06-01 DIAGNOSIS — K62.5 RECTAL BLEEDING: Primary | ICD-10-CM

## 2022-06-01 DIAGNOSIS — M50.30 DDD (DEGENERATIVE DISC DISEASE), CERVICAL: ICD-10-CM

## 2022-06-01 DIAGNOSIS — G89.29 OTHER CHRONIC PAIN: Primary | ICD-10-CM

## 2022-06-01 DIAGNOSIS — M47.812 ARTHROPATHY OF CERVICAL FACET JOINT: ICD-10-CM

## 2022-06-01 PROCEDURE — 99212 OFFICE O/P EST SF 10 MIN: CPT | Performed by: NURSE PRACTITIONER

## 2022-06-01 NOTE — PROGRESS NOTES
CHIEF COMPLAINT  F/U neck pain- patient states that her pain has remained the same since her last visit.     Subjective   Deanne Upton is a 77 y.o. female  who presents for follow-up.  She has a history of neck pain.      Procedure List ---  Bilateral C3-6 Cervical Medial Branch Blockade   on  3/24/2022 --- 90% ongoing relief from the procedure. May be beginning to wane.    Cervical Epidural Steroid Injection @ C5-C6   on  2/8/2022 --- helped radicular complaints only      Does not tolerate NSAID's or opioids.  Takes Tylenol PRN.     Patient remained masked during entire encounter. No cough present. I donned a mask and eye protection throughout entire visit. Prior to donning mask and eye protection, hand hygiene was performed, as well as when it was doffed.  I was closer than 6 feet, but not for an extended period of time. No obvious exposure to any bodily fluids.    Neck Pain   This is a chronic problem. The current episode started more than 1 year ago. The problem occurs daily. The problem has been waxing and waning. The pain is present in the left side and right side. The quality of the pain is described as burning and shooting (bilateral shoulders, radiates down right arm into right hand). The pain is at a severity of 3/10. The pain is mild. The symptoms are aggravated by position. Pertinent negatives include no chest pain, fever, headaches, numbness or weakness. She has tried home exercises and acetaminophen for the symptoms. The treatment provided moderate relief.     PEG Assessment   What number best describes your pain on average in the past week?4  What number best describes how, during the past week, pain has interfered with your enjoyment of life?5  What number best describes how, during the past week, pain has interfered with your general activity?  5    The following portions of the patient's history were reviewed and updated as appropriate: allergies, current medications, past family history, past  "medical history, past social history, past surgical history and problem list.    Review of Systems   Constitutional: Positive for activity change (decreased) and fatigue. Negative for chills and fever.   HENT: Negative for congestion.    Eyes: Negative for visual disturbance.   Respiratory: Negative for chest tightness and shortness of breath.    Cardiovascular: Negative for chest pain.   Gastrointestinal: Positive for abdominal pain, constipation and diarrhea.   Genitourinary: Positive for difficulty urinating. Negative for dyspareunia and dysuria.   Musculoskeletal: Positive for neck pain. Negative for back pain.   Neurological: Negative for dizziness, weakness, light-headedness, numbness and headaches.   Psychiatric/Behavioral: Negative for agitation and sleep disturbance. The patient is nervous/anxious.    I have reviewed and confirmed the accuracy of the ROS as documented by the MA/LPN/RN ROMAN Nava    Vitals:    06/01/22 1049   BP: 142/79   Pulse: 90   Temp: 96 °F (35.6 °C)   SpO2: 94%   Weight: 85.4 kg (188 lb 3.2 oz)   Height: 162.6 cm (64\")   PainSc:   3   PainLoc: Neck     Objective   Physical Exam  Vitals and nursing note reviewed.   Constitutional:       General: She is not in acute distress.     Appearance: Normal appearance.             Assessment & Plan   Diagnoses and all orders for this visit:    1. Other chronic pain (Primary)    2. Arthropathy of cervical facet joint    3. DDD (degenerative disc disease), cervical      --- Repeat cervical MBB as needed  --- Follow-up PRN           This document is intended for medical expert use only. Reading of this document by patients and/or patient's family without participating medical staff guidance may result in misinterpretation and unintended morbidity.   Any interpretation of such data is the responsibility of the patient and/or family member responsible for the patient in concert with their primary or specialist providers, not to be left " for sources of online searches such as [employer] or similar queries. Relying on these approaches to knowledge may result in misinterpretation, misguided goals of care and even death should patients or family members try recommendations outside of the realm of professional medical care in a supervised way.

## 2022-06-01 NOTE — TELEPHONE ENCOUNTER
"I called to discuss pt's symptoms with her. I received a message that she is having \"continuous blood in her stool\" and she was 30 min late for her appt today so she had to reschedule.   A question came from the office about giving her a referral to the gi doctor.   I called to clarify the extent of her symptoms and how she was feeling.   I discussed concerning symptoms for which she should not wait to be evaluated in the office but should go to the hospital. More bleeding, trouble tolerating hydration, lightheadedness.  I am going to go ahead and put in a referral to GI for her and it will be good to have her into the office for labs and exam.  "

## 2022-06-07 ENCOUNTER — OFFICE VISIT (OUTPATIENT)
Dept: FAMILY MEDICINE CLINIC | Facility: CLINIC | Age: 77
End: 2022-06-07

## 2022-06-07 VITALS
SYSTOLIC BLOOD PRESSURE: 128 MMHG | HEIGHT: 64 IN | BODY MASS INDEX: 32.3 KG/M2 | DIASTOLIC BLOOD PRESSURE: 70 MMHG | HEART RATE: 63 BPM | TEMPERATURE: 97.5 F

## 2022-06-07 DIAGNOSIS — K62.5 RECTAL BLEEDING: Primary | ICD-10-CM

## 2022-06-07 PROCEDURE — 99213 OFFICE O/P EST LOW 20 MIN: CPT | Performed by: NURSE PRACTITIONER

## 2022-06-07 NOTE — PROGRESS NOTES
"Chief Complaint  Rectal Bleeding (C/o blood in stool )    Subjective        Deanne Upton presents to [facility]  History of Present Illness pt known to me for previous acute visit.  Today here for melena with some clots noted over last couple weeks.  No known hemorrhoids.  She has discussed this with her PCP and was told to come in for eval.    No recent blood loss for appr 1 week  But previous was large amount and BRB.   She has been referred to GI by PCP and not sure where she wants to go.  Thinks she might like to see Dr Pitts  She has no fever, chills, worsening fatigue, weakness, new dyspnea.     Objective   Vital Signs:  /70   Pulse 63   Temp 97.5 °F (36.4 °C)   Ht 162.6 cm (64\")   BMI 32.30 kg/m²   Estimated body mass index is 32.3 kg/m² as calculated from the following:    Height as of this encounter: 162.6 cm (64\").    Weight as of 6/1/22: 85.4 kg (188 lb 3.2 oz).           Physical Exam  Vitals and nursing note reviewed.   Constitutional:       General: She is not in acute distress.     Appearance: She is well-developed. She is not ill-appearing or diaphoretic.   HENT:      Head: Normocephalic and atraumatic.   Eyes:      General:         Right eye: No discharge.         Left eye: No discharge.      Conjunctiva/sclera: Conjunctivae normal.   Cardiovascular:      Rate and Rhythm: Normal rate and regular rhythm.      Heart sounds: Normal heart sounds.   Pulmonary:      Effort: Pulmonary effort is normal.      Breath sounds: Normal breath sounds.   Abdominal:      General: Bowel sounds are normal. There is no distension.      Palpations: Abdomen is soft. There is no mass.      Tenderness: There is no abdominal tenderness. There is no guarding.   Skin:     General: Skin is warm and dry.   Neurological:      General: No focal deficit present.      Mental Status: She is alert and oriented to person, place, and time.   Psychiatric:         Mood and Affect: Mood normal.   "       Behavior: Behavior normal.      Comments: Very pleasant and conversant        Result Review :                Assessment and Plan   Diagnoses and all orders for this visit:    1. Rectal bleeding (Primary)    Reviewed notes in chart.  Pt does have referral in for GI-not sure who has been consulted.  Pt would like to talk to other friend to see who they recommend and will let us know sooner than later.  I recommend GI group here at [place].  Pt seems currently stable and does not want CBC to check for anemia due to cost which is reasonable. Will give her 3 stools for hemoccult and have her return them to eval for melena.  She is aware of s/s requiring emergent eval. It looks like in reviewing chart that PCP referred for CRC 7/2021 but pt never returned packet that was mailed and did not have procedure.  I see 1 FIT test that was negative in 2016.          Follow Up   No follow-ups on file.  Patient was given instructions and counseling regarding her condition or for health maintenance advice. Please see specific information pulled into the AVS if appropriate.

## 2022-06-27 ENCOUNTER — CLINICAL SUPPORT (OUTPATIENT)
Dept: ORTHOPEDIC SURGERY | Facility: CLINIC | Age: 77
End: 2022-06-27

## 2022-06-27 VITALS — WEIGHT: 190.4 LBS | HEIGHT: 64 IN | TEMPERATURE: 96.4 F | BODY MASS INDEX: 32.5 KG/M2

## 2022-06-27 DIAGNOSIS — M17.0 BILATERAL PRIMARY OSTEOARTHRITIS OF KNEE: Primary | ICD-10-CM

## 2022-06-27 PROCEDURE — 20610 DRAIN/INJ JOINT/BURSA W/O US: CPT | Performed by: NURSE PRACTITIONER

## 2022-06-27 RX ORDER — METHYLPREDNISOLONE ACETATE 80 MG/ML
80 INJECTION, SUSPENSION INTRA-ARTICULAR; INTRALESIONAL; INTRAMUSCULAR; SOFT TISSUE
Status: COMPLETED | OUTPATIENT
Start: 2022-06-27 | End: 2022-06-27

## 2022-06-27 RX ADMIN — METHYLPREDNISOLONE ACETATE 80 MG: 80 INJECTION, SUSPENSION INTRA-ARTICULAR; INTRALESIONAL; INTRAMUSCULAR; SOFT TISSUE at 09:30

## 2022-06-27 NOTE — PROGRESS NOTES
6/27/2022    Deanne Upton is here today for worsening knee pain. Pt has undergone injection of the knee in the past with good resolution of symptoms. Pt is requesting a repeat injection.     KNEE Injection Procedure Note:    Large Joint Arthrocentesis: R knee  Date/Time: 6/27/2022 9:30 AM  Consent given by: patient  Site marked: site marked  Timeout: Immediately prior to procedure a time out was called to verify the correct patient, procedure, equipment, support staff and site/side marked as required   Supporting Documentation  Indications: pain and joint swelling   Procedure Details  Location: knee - R knee  Preparation: Patient was prepped and draped in the usual sterile fashion  Needle size: 22 G  Approach: anterolateral  Medications administered: 80 mg methylPREDNISolone acetate 80 MG/ML; 2 mL lidocaine (cardiac)  Patient tolerance: patient tolerated the procedure well with no immediate complications    Large Joint Arthrocentesis: L knee  Date/Time: 6/27/2022 9:30 AM  Consent given by: patient  Site marked: site marked  Timeout: Immediately prior to procedure a time out was called to verify the correct patient, procedure, equipment, support staff and site/side marked as required   Supporting Documentation  Indications: pain and joint swelling   Procedure Details  Location: knee - L knee  Preparation: Patient was prepped and draped in the usual sterile fashion  Needle size: 22 G  Approach: anterolateral  Medications administered: 80 mg methylPREDNISolone acetate 80 MG/ML; 2 mL lidocaine (cardiac)  Patient tolerance: patient tolerated the procedure well with no immediate complications          At the conclusion of the injection I discussed the importance of continued quad strengthening exercises on a daily basis. I will see the patient back if the symptoms should fail to improve or worsen.    Tianna Hale, APRN  6/27/2022

## 2022-07-11 ENCOUNTER — OFFICE VISIT (OUTPATIENT)
Dept: FAMILY MEDICINE CLINIC | Facility: CLINIC | Age: 77
End: 2022-07-11

## 2022-07-11 VITALS
HEIGHT: 64 IN | HEART RATE: 95 BPM | TEMPERATURE: 97.8 F | DIASTOLIC BLOOD PRESSURE: 74 MMHG | WEIGHT: 189.7 LBS | RESPIRATION RATE: 18 BRPM | BODY MASS INDEX: 32.39 KG/M2 | SYSTOLIC BLOOD PRESSURE: 134 MMHG | OXYGEN SATURATION: 96 %

## 2022-07-11 DIAGNOSIS — Z12.31 ENCOUNTER FOR SCREENING MAMMOGRAM FOR MALIGNANT NEOPLASM OF BREAST: ICD-10-CM

## 2022-07-11 DIAGNOSIS — I10 PRIMARY HYPERTENSION: ICD-10-CM

## 2022-07-11 DIAGNOSIS — E11.69 TYPE 2 DIABETES MELLITUS WITH OTHER SPECIFIED COMPLICATION, WITHOUT LONG-TERM CURRENT USE OF INSULIN: ICD-10-CM

## 2022-07-11 DIAGNOSIS — L60.0 INGROWN TOENAIL OF LEFT FOOT: ICD-10-CM

## 2022-07-11 DIAGNOSIS — R20.0 LIP NUMBNESS: ICD-10-CM

## 2022-07-11 DIAGNOSIS — E11.69 TYPE 2 DIABETES MELLITUS WITH OTHER SPECIFIED COMPLICATION, WITHOUT LONG-TERM CURRENT USE OF INSULIN: Primary | ICD-10-CM

## 2022-07-11 DIAGNOSIS — R25.3 FASCICULATIONS: ICD-10-CM

## 2022-07-11 PROCEDURE — 99214 OFFICE O/P EST MOD 30 MIN: CPT | Performed by: FAMILY MEDICINE

## 2022-07-11 NOTE — PROGRESS NOTES
"Chief Complaint  Diabetes (6 MFU)    Subjective        Deanne Upton presents to [facility]  History of Present Illness  Miss Upton is a 77-year-old with type 2 diabetes, hypertension, and mild intermittent asthma. She is presenting for a follow-up on her diabetes. Her weight is stable. Her blood pressure is well controlled. Her last A1c was 6.4%.    The patient reports that the 3rd digit on her right foot has been bothering her. She states that it became painful 3 to 4 weeks ago. The patient denies any recent injury, change in footwear, or increased level of activity. She states that her toe is red now but notes that it intermittently turns to a \"bluish\" color. The patient states that she has been using gel for ingrown toenails to help alleviate the pain. She also reports that she is experiencing pain in her entire left foot. The patient has been seeing ROMAN Sepulveda, in orthopedics and has follow-ups every 3 months. She is also a patient of Dr. Berrios.     The patient reports that she has been experiencing intermittent trembling on the left side of her upper lip for the past 3 to 4 weeks. She states that her symptoms are mild. She denies any numbness but notes that she did have tingling in the beginning. The patient states that she does not have this feeling anywhere else on her body. She denies any pain. The patient states that it has not affected her ability to eat or drink. She denies having any trouble talking or forming her mouth.    She reports that she has always had muscle cramps in the posterior aspect of her left leg but reports that yesterday, she experienced muscle cramps in her upper left thigh.    The patient also reports that she has been experiencing pain in the right side of her rib cage, below her breast. She states that it is tender upon palpation. The patient states that she feels like her rib cage \"expands\". She notes that it is worse in the evening " "and has to take off her bra. The patient states that her symptoms began in 01/2022. She denies any recent coughing. She denies any falls or trauma to the chest.    She reports that she has not had any more blood in her stool. She states that she thinks it was caused by the diarrhea she was experiencing while taking doxycycline. She notes that she has a history of hemorrhoids.     The patient notes that she has a history of glaucoma.      Objective   Vital Signs:  /74 (BP Location: Right arm, Patient Position: Sitting, Cuff Size: Adult)   Pulse 95   Temp 97.8 °F (36.6 °C) (Infrared)   Resp 18   Ht 162.6 cm (64.02\")   Wt 86 kg (189 lb 11.2 oz)   SpO2 96%   BMI 32.54 kg/m²       Physical Exam  Vitals reviewed.   Constitutional:       General: She is not in acute distress.  HENT:      Right Ear: External ear normal.      Left Ear: External ear normal.      Nose: Nose normal.      Mouth/Throat:      Pharynx: No oropharyngeal exudate.   Eyes:      General: No scleral icterus.        Right eye: No discharge.         Left eye: No discharge.      Conjunctiva/sclera: Conjunctivae normal.   Neck:      Thyroid: No thyromegaly.   Cardiovascular:      Rate and Rhythm: Normal rate and regular rhythm.      Heart sounds: Normal heart sounds. No murmur heard.    No friction rub. No gallop.   Pulmonary:      Effort: Pulmonary effort is normal. No respiratory distress.      Breath sounds: Normal breath sounds. No wheezing or rales.   Chest:      Chest wall: Tenderness present.      Comments: The anterior rib on the right and it's most tender along the costophrenic angle and just over that lower surface of the ribs.  Abdominal:      General: Bowel sounds are normal. There is no distension.      Palpations: Abdomen is soft. There is no mass.      Tenderness: There is no abdominal tenderness. There is no guarding.   Musculoskeletal:         General: No deformity.      Cervical back: Neck supple.   Feet:      Right foot:      " Toenail Condition: Right toenails are ingrown.      Comments: Third digit on the right foot with a little bit of medial erythema, no swelling or drainage. On the lateral aspect there is a little bit of callus formation, and she does appear to have an ingrown toenail bilaterally.  Lymphadenopathy:      Cervical: No cervical adenopathy.   Skin:     General: Skin is warm and dry.   Neurological:      Mental Status: She is alert and oriented to person, place, and time.      Motor: No abnormal muscle tone.      Coordination: Coordination normal.   Psychiatric:         Behavior: Behavior normal.          Result Review :                    Assessment and Plan   Diagnoses and all orders for this visit:    1. Type 2 diabetes mellitus with other specified complication, without long-term current use of insulin (HCC) (Primary)  -     TSH Rfx On Abnormal To Free T4; Future    2. Primary hypertension    3. Encounter for screening mammogram for malignant neoplasm of breast  -     Mammo Screening Bilateral With CAD; Future    4. Ingrown toenail of left foot  -     Ambulatory Referral to Podiatry    5. Lip numbness  -     Vitamin B12; Future  -     TSH Rfx On Abnormal To Free T4; Future    6. Fasciculations  -     Vitamin B12; Future  -     TSH Rfx On Abnormal To Free T4; Future    DM  -Remains well controlled.   Reviewed labs pt had prior to appt.   Continue same medication.     HTN  BP is well controlled.   She is on and ACE-inh    Ingrown toenail of left foot  - She appears to have an ingrown toenail bilaterally on the 3rd digit of the right foot. I will put in a referral for podiatry.    Lip numbness  - I do not feel that a referral to neurology is necessary at this time. I instructed the patient to monitor her symptoms and keep me updated.  -Will check in on labs add-on for her symptoms.        I spent 23 minutes caring for Deanne on this date of service. This time includes time spent by me in the following activities:preparing  for the visit, reviewing tests, obtaining and/or reviewing a separately obtained history, performing a medically appropriate examination and/or evaluation , counseling and educating the patient/family/caregiver, ordering medications, tests, or procedures, referring and communicating with other health care professionals , documenting information in the medical record, independently interpreting results and communicating that information with the patient/family/caregiver and care coordination    Follow Up   No follow-ups on file.  Patient was given instructions and counseling regarding her condition or for health maintenance advice. Please see specific information pulled into the AVS if appropriate.       Transcribed from ambient dictation for Sangeetha Urbina MD by Jules Hastings.  07/11/22   18:22 EDT    Patient verbalized consent to the visit recording.  I have personally performed the services described in this document as transcribed by the above individual, and it is both accurate and complete.  Sangeetha Urbina MD  7:41 7/19/22 EDT

## 2022-07-13 ENCOUNTER — TRANSCRIBE ORDERS (OUTPATIENT)
Dept: ADMINISTRATIVE | Facility: HOSPITAL | Age: 77
End: 2022-07-13

## 2022-07-13 DIAGNOSIS — Z12.31 SCREENING MAMMOGRAM FOR BREAST CANCER: Primary | ICD-10-CM

## 2022-09-07 ENCOUNTER — APPOINTMENT (OUTPATIENT)
Dept: MAMMOGRAPHY | Facility: HOSPITAL | Age: 77
End: 2022-09-07

## 2022-10-19 RX ORDER — MONTELUKAST SODIUM 10 MG/1
TABLET ORAL
Qty: 90 TABLET | Refills: 3 | Status: SHIPPED | OUTPATIENT
Start: 2022-10-19 | End: 2023-01-03 | Stop reason: SDUPTHER

## 2022-10-26 DIAGNOSIS — J30.1 SEASONAL ALLERGIC RHINITIS DUE TO POLLEN: ICD-10-CM

## 2022-10-26 RX ORDER — BENZONATATE 200 MG/1
200 CAPSULE ORAL 3 TIMES DAILY PRN
Qty: 30 CAPSULE | Refills: 0 | Status: CANCELLED | OUTPATIENT
Start: 2022-10-26

## 2022-10-26 NOTE — TELEPHONE ENCOUNTER
Caller: Upton Deanne GODFREY    Relationship: Self    Best call back number: [phone]    Requested Prescriptions:   Requested Prescriptions     Pending Prescriptions Disp Refills   • benzonatate (TESSALON) 200 MG capsule 30 capsule 0     Sig: Take 1 capsule by mouth 3 (Three) Times a Day As Needed for Cough.        Pharmacy where request should be sent:    WHEREVER SHE CAN GET IT CHEAPEST FROM [facility]     Additional details provided by patient: [facility] AND INSURANCE IS NOT COVERING IT AND WOULD LIKE TO KNOW WHERE SHE CAN GET IT CHEAPEST     Does the patient have less than a 3 day supply:  [x] Yes  [] No    Lowell Hall Rep   10/26/22 11:07 EDT

## 2022-10-27 NOTE — TELEPHONE ENCOUNTER
Caller: Deanne Upton    Relationship: Self    Best call back number: [phone]      PATIENT IS LEAVING [facility] ON Sunday MORNING AND SHE NEEDS HELP FINDING A PHARMACY THAT SHE CAN GET THESE TESSALON PERLES FROM FOR CHEAP.     PLEASE ADVISE

## 2022-10-28 ENCOUNTER — OFFICE VISIT (OUTPATIENT)
Dept: FAMILY MEDICINE CLINIC | Facility: CLINIC | Age: 77
End: 2022-10-28

## 2022-10-28 VITALS
DIASTOLIC BLOOD PRESSURE: 80 MMHG | RESPIRATION RATE: 17 BRPM | SYSTOLIC BLOOD PRESSURE: 110 MMHG | HEART RATE: 81 BPM | WEIGHT: 189 LBS | OXYGEN SATURATION: 97 % | BODY MASS INDEX: 32.27 KG/M2 | HEIGHT: 64 IN | TEMPERATURE: 97.3 F

## 2022-10-28 DIAGNOSIS — U07.1 COVID-19 VIRUS INFECTION: Primary | ICD-10-CM

## 2022-10-28 DIAGNOSIS — R05.9 COUGH, UNSPECIFIED TYPE: ICD-10-CM

## 2022-10-28 LAB
EXPIRATION DATE: ABNORMAL
FLUAV AG UPPER RESP QL IA.RAPID: NOT DETECTED
FLUBV AG UPPER RESP QL IA.RAPID: NOT DETECTED
INTERNAL CONTROL: ABNORMAL
Lab: ABNORMAL
SARS-COV-2 AG UPPER RESP QL IA.RAPID: DETECTED

## 2022-10-28 PROCEDURE — 99213 OFFICE O/P EST LOW 20 MIN: CPT | Performed by: FAMILY MEDICINE

## 2022-10-28 PROCEDURE — 87428 SARSCOV & INF VIR A&B AG IA: CPT | Performed by: FAMILY MEDICINE

## 2022-10-28 RX ORDER — HYDROCODONE BITARTRATE AND HOMATROPINE METHYLBROMIDE ORAL SOLUTION 5; 1.5 MG/5ML; MG/5ML
5 LIQUID ORAL EVERY 6 HOURS PRN
Qty: 120 ML | Refills: 0 | Status: SHIPPED | OUTPATIENT
Start: 2022-10-28 | End: 2023-01-12

## 2022-10-28 RX ORDER — BENZONATATE 200 MG/1
200 CAPSULE ORAL 3 TIMES DAILY PRN
Qty: 30 CAPSULE | Refills: 0 | Status: SHIPPED | OUTPATIENT
Start: 2022-10-28 | End: 2022-11-11 | Stop reason: SDUPTHER

## 2022-11-01 ENCOUNTER — TELEPHONE (OUTPATIENT)
Dept: FAMILY MEDICINE CLINIC | Facility: CLINIC | Age: 77
End: 2022-11-01

## 2022-11-01 PROBLEM — U07.1 COVID-19 VIRUS INFECTION: Status: ACTIVE | Noted: 2022-11-01

## 2022-11-01 NOTE — ASSESSMENT & PLAN NOTE
Patient was advised to take Tessalon Perles and Hydromet to treat her symptoms.  Patient is encouraged to use vitamin C, vitamin D and zinc.  Patient's symptoms should be improving.  If patient does not improve she will be prescribed Augmentin to treat her symptoms.

## 2022-11-08 ENCOUNTER — TELEPHONE (OUTPATIENT)
Dept: URGENT CARE | Facility: CLINIC | Age: 77
End: 2022-11-08

## 2022-11-08 NOTE — TELEPHONE ENCOUNTER
Patient left letter at Urgent Care asking for medical advice and med change, Patient stated she does not keep her phone on due to scams. Voicemail was left for patient to call UC back.

## 2022-11-09 NOTE — TELEPHONE ENCOUNTER
Spoke with patient , Doxycycline and Prednisone updated on allergy list . Cefdinir sent to pharmacy per provider.

## 2022-11-11 DIAGNOSIS — R05.9 COUGH, UNSPECIFIED TYPE: ICD-10-CM

## 2022-11-11 RX ORDER — BENZONATATE 200 MG/1
200 CAPSULE ORAL 3 TIMES DAILY PRN
Qty: 30 CAPSULE | Refills: 0 | Status: SHIPPED | OUTPATIENT
Start: 2022-11-11 | End: 2023-01-12 | Stop reason: SDUPTHER

## 2022-11-11 NOTE — TELEPHONE ENCOUNTER
Caller: Upton Deanne GODFREY    Relationship: Self    Best call back number: [phone]    Requested Prescriptions:   Requested Prescriptions     Pending Prescriptions Disp Refills   • benzonatate (TESSALON) 200 MG capsule 30 capsule 0     Sig: Take 1 capsule by mouth 3 (Three) Times a Day As Needed for Cough.        Pharmacy where request should be sent: [id number] - [phone]  - [phone] FX     Additional details provided by patient: PATIENT IS COMPLETELY OUT    Does the patient have less than a 3 day supply:  [x] Yes  [] No    Lowell Rosas Rep   11/11/22 08:33 EST

## 2022-12-12 ENCOUNTER — OFFICE VISIT (OUTPATIENT)
Dept: FAMILY MEDICINE CLINIC | Facility: CLINIC | Age: 77
End: 2022-12-12

## 2022-12-12 VITALS
DIASTOLIC BLOOD PRESSURE: 70 MMHG | SYSTOLIC BLOOD PRESSURE: 110 MMHG | HEART RATE: 94 BPM | WEIGHT: 186 LBS | OXYGEN SATURATION: 97 % | BODY MASS INDEX: 31.76 KG/M2 | RESPIRATION RATE: 20 BRPM | TEMPERATURE: 97.5 F | HEIGHT: 64 IN

## 2022-12-12 DIAGNOSIS — R05.9 COUGH, UNSPECIFIED TYPE: ICD-10-CM

## 2022-12-12 DIAGNOSIS — R05.1 ACUTE COUGH: Primary | ICD-10-CM

## 2022-12-12 DIAGNOSIS — Z20.822 CLOSE EXPOSURE TO COVID-19 VIRUS: ICD-10-CM

## 2022-12-12 LAB
EXPIRATION DATE: ABNORMAL
FLUAV AG UPPER RESP QL IA.RAPID: DETECTED
FLUBV AG UPPER RESP QL IA.RAPID: NOT DETECTED
INTERNAL CONTROL: ABNORMAL
Lab: ABNORMAL
SARS-COV-2 AG UPPER RESP QL IA.RAPID: NOT DETECTED

## 2022-12-12 PROCEDURE — 99213 OFFICE O/P EST LOW 20 MIN: CPT | Performed by: FAMILY MEDICINE

## 2022-12-12 PROCEDURE — 87428 SARSCOV & INF VIR A&B AG IA: CPT | Performed by: FAMILY MEDICINE

## 2022-12-12 RX ORDER — BENZONATATE 200 MG/1
200 CAPSULE ORAL 3 TIMES DAILY PRN
Qty: 30 CAPSULE | Refills: 0 | Status: SHIPPED | OUTPATIENT
Start: 2022-12-12

## 2022-12-12 RX ORDER — GUAIFENESIN AND CODEINE PHOSPHATE 100; 10 MG/5ML; MG/5ML
5 SOLUTION ORAL 3 TIMES DAILY PRN
Qty: 118 ML | Refills: 0 | Status: SHIPPED | OUTPATIENT
Start: 2022-12-12 | End: 2023-01-12

## 2022-12-12 NOTE — PROGRESS NOTES
"Chief Complaint  Chief Complaint   Patient presents with   • Cough     Pt c/o congestion, cough, runny nose, headaches x 1 week     The patient presents at today's visit for congestion, cough, headaches and runny nose for the last week.     Subjective    History of Present Illness        Deanne Upton presents to [facility] for   History of Present Illness  Influenza  The patient shares that she has been experiencing an excessive cough, fatigue,  chest pain, chills, and runny nose.     Medication sensitivity  The patient reports that she was prescribed doxycycline in the past and it has caused her excessive diarrhea. She shares that she would not like to be prescribed that medication again. The patient also mentioned that she received a low dose opioid that caused her to experience diarrhea, headaches, upset stomach, along with other symptoms.          Objective   Vital Signs:   Visit Vitals  /70   Pulse 94   Temp 97.5 °F (36.4 °C)   Resp 20   Ht 162.6 cm (64\")   Wt 84.4 kg (186 lb)   LMP  (LMP Unknown)   SpO2 97%   BMI 31.93 kg/m²          Physical Exam  Vitals reviewed.   Constitutional:       Appearance: She is well-developed.   HENT:      Head: Normocephalic.      Right Ear: External ear normal.      Left Ear: External ear normal.      Nose: Nose normal.      Mouth/Throat:      Pharynx: Posterior oropharyngeal erythema present.   Eyes:      Conjunctiva/sclera: Conjunctivae normal.   Cardiovascular:      Rate and Rhythm: Normal rate and regular rhythm.   Pulmonary:      Effort: Pulmonary effort is normal.      Breath sounds: Normal breath sounds.   Musculoskeletal:         General: Normal range of motion.      Cervical back: Normal range of motion and neck supple.   Skin:     General: Skin is warm and dry.      Capillary Refill: Capillary refill takes less than 2 seconds.   Neurological:      Mental Status: She is alert and oriented to person, place, and time.      "         Result Review :                      Assessment and Plan      Diagnoses and all orders for this visit:    1. Acute cough (Primary)  Assessment & Plan:  Patient was positive for influenza A.  Due to the timeframe she was not prescribed Tamiflu.  Patient was prescribed Cheratussin and Tessalon Perles to help treat her symptoms.  Patient was encouraged to return to clinic if her symptoms do not improve.    Orders:  -     guaiFENesin-codeine (GUAIFENESIN AC) 100-10 MG/5ML liquid; Take 5 mL by mouth 3 (Three) Times a Day As Needed for Cough.  Dispense: 118 mL; Refill: 0  -     benzonatate (TESSALON) 200 MG capsule; Take 1 capsule by mouth 3 (Three) Times a Day As Needed for Cough.  Dispense: 30 capsule; Refill: 0    2. Close exposure to COVID-19 virus  -     POCT SARS-CoV-2 Antigen MAYURI    3. Cough, unspecified type           Follow Up   No follow-ups on file.  Patient was given instructions and counseling regarding her condition or for health maintenance advice. Please see specific information pulled into the AVS if appropriate.

## 2022-12-12 NOTE — ASSESSMENT & PLAN NOTE
Patient was positive for influenza A.  Due to the timeframe she was not prescribed Tamiflu.  Patient was prescribed Cheratussin and Tessalon Perles to help treat her symptoms.  Patient was encouraged to return to clinic if her symptoms do not improve.

## 2022-12-14 ENCOUNTER — TELEPHONE (OUTPATIENT)
Dept: FAMILY MEDICINE CLINIC | Facility: CLINIC | Age: 77
End: 2022-12-14

## 2023-01-02 DIAGNOSIS — E11.8 DIABETIC COMPLICATION: ICD-10-CM

## 2023-01-03 DIAGNOSIS — E11.8 DIABETIC COMPLICATION: ICD-10-CM

## 2023-01-03 RX ORDER — LISINOPRIL AND HYDROCHLOROTHIAZIDE 12.5; 1 MG/1; MG/1
1 TABLET ORAL DAILY
Qty: 90 TABLET | Refills: 3 | Status: SHIPPED | OUTPATIENT
Start: 2023-01-03

## 2023-01-03 RX ORDER — MONTELUKAST SODIUM 10 MG/1
10 TABLET ORAL
Qty: 90 TABLET | Refills: 3 | Status: SHIPPED | OUTPATIENT
Start: 2023-01-03

## 2023-01-03 RX ORDER — BUDESONIDE AND FORMOTEROL FUMARATE DIHYDRATE 160; 4.5 UG/1; UG/1
AEROSOL RESPIRATORY (INHALATION)
Qty: 30.6 G | Refills: 3 | OUTPATIENT
Start: 2023-01-03

## 2023-01-03 RX ORDER — BUDESONIDE AND FORMOTEROL FUMARATE DIHYDRATE 160; 4.5 UG/1; UG/1
AEROSOL RESPIRATORY (INHALATION)
Qty: 30.6 G | Refills: 3 | Status: SHIPPED | OUTPATIENT
Start: 2023-01-03

## 2023-01-03 RX ORDER — SIMVASTATIN 10 MG
10 TABLET ORAL
Qty: 90 TABLET | Refills: 3 | Status: SHIPPED | OUTPATIENT
Start: 2023-01-03

## 2023-01-03 NOTE — TELEPHONE ENCOUNTER
Caller: Deanne Upton    Relationship: Self    Best call back number: [phone]    Requested Prescriptions:   Requested Prescriptions     Pending Prescriptions Disp Refills   • metFORMIN (GLUCOPHAGE) 500 MG tablet 360 tablet 3     Sig: Take 2 tablets by mouth 2 (Two) Times a Day.   • budesonide-formoterol (Symbicort) 160-4.5 MCG/ACT inhaler 30.6 g 3   • montelukast (SINGULAIR) 10 MG tablet 90 tablet 3     Sig: Take 1 tablet by mouth every night at bedtime.   • lisinopril-hydrochlorothiazide (PRINZIDE,ZESTORETIC) 10-12.5 MG per tablet 90 tablet 3     Sig: Take 1 tablet by mouth Daily.   • simvastatin (ZOCOR) 10 MG tablet 90 tablet 3     Sig: Take 1 tablet by mouth every night at bedtime.        Pharmacy where request should be sent:      Additional details provided by patient: PATIENT SAYS THAT SHE WILL BE OUT OF MEDICATIONS ON SAT 1/7/23 AND THEY ARE MAIL SERVICE PRESCRIPTIONS.  SHE REQUESTS 90 DAY SUPPLY.    Does the patient have less than a 3 day supply:  [] Yes  [x] No    Would you like a call back once the refill request has been completed: [x] Yes [] No    If the office needs to give you a call back, can they leave a voicemail: [x] Yes [] No    PLEASE ADVISE.    Lowell Vitale Rep   01/03/23 10:47 EST

## 2023-01-06 DIAGNOSIS — N18.30 STAGE 3 CHRONIC KIDNEY DISEASE, UNSPECIFIED WHETHER STAGE 3A OR 3B CKD: ICD-10-CM

## 2023-01-06 DIAGNOSIS — E11.69 TYPE 2 DIABETES MELLITUS WITH OTHER SPECIFIED COMPLICATION, WITHOUT LONG-TERM CURRENT USE OF INSULIN: ICD-10-CM

## 2023-01-06 DIAGNOSIS — I10 PRIMARY HYPERTENSION: Primary | ICD-10-CM

## 2023-01-06 DIAGNOSIS — E78.5 HYPERLIPIDEMIA, UNSPECIFIED HYPERLIPIDEMIA TYPE: ICD-10-CM

## 2023-01-12 ENCOUNTER — OFFICE VISIT (OUTPATIENT)
Dept: FAMILY MEDICINE CLINIC | Facility: CLINIC | Age: 78
End: 2023-01-12
Payer: MEDICARE

## 2023-01-12 ENCOUNTER — TELEPHONE (OUTPATIENT)
Dept: FAMILY MEDICINE CLINIC | Facility: CLINIC | Age: 78
End: 2023-01-12

## 2023-01-12 VITALS
HEART RATE: 86 BPM | HEIGHT: 64 IN | OXYGEN SATURATION: 95 % | SYSTOLIC BLOOD PRESSURE: 128 MMHG | RESPIRATION RATE: 19 BRPM | BODY MASS INDEX: 31.58 KG/M2 | TEMPERATURE: 98.2 F | WEIGHT: 185 LBS | DIASTOLIC BLOOD PRESSURE: 78 MMHG

## 2023-01-12 DIAGNOSIS — R13.19 ESOPHAGEAL DYSPHAGIA: ICD-10-CM

## 2023-01-12 DIAGNOSIS — I10 PRIMARY HYPERTENSION: ICD-10-CM

## 2023-01-12 DIAGNOSIS — N18.30 STAGE 3 CHRONIC KIDNEY DISEASE, UNSPECIFIED WHETHER STAGE 3A OR 3B CKD: ICD-10-CM

## 2023-01-12 DIAGNOSIS — Z00.00 MEDICARE ANNUAL WELLNESS VISIT, SUBSEQUENT: Primary | ICD-10-CM

## 2023-01-12 DIAGNOSIS — E11.69 TYPE 2 DIABETES MELLITUS WITH OTHER SPECIFIED COMPLICATION, WITHOUT LONG-TERM CURRENT USE OF INSULIN: ICD-10-CM

## 2023-01-12 DIAGNOSIS — Z12.11 COLON CANCER SCREENING: ICD-10-CM

## 2023-01-12 PROCEDURE — G0008 ADMIN INFLUENZA VIRUS VAC: HCPCS | Performed by: FAMILY MEDICINE

## 2023-01-12 PROCEDURE — G0439 PPPS, SUBSEQ VISIT: HCPCS | Performed by: FAMILY MEDICINE

## 2023-01-12 PROCEDURE — 1160F RVW MEDS BY RX/DR IN RCRD: CPT | Performed by: FAMILY MEDICINE

## 2023-01-12 PROCEDURE — 90662 IIV NO PRSV INCREASED AG IM: CPT | Performed by: FAMILY MEDICINE

## 2023-01-12 PROCEDURE — 1170F FXNL STATUS ASSESSED: CPT | Performed by: FAMILY MEDICINE

## 2023-01-12 RX ORDER — OMEPRAZOLE 40 MG/1
40 CAPSULE, DELAYED RELEASE ORAL DAILY
Qty: 90 CAPSULE | Refills: 0 | Status: SHIPPED | OUTPATIENT
Start: 2023-01-12

## 2023-01-12 NOTE — TELEPHONE ENCOUNTER
CALLED AND LVM FOR PT TO RESCHEDULE APPT WITH DR SUMMERS FOR Monday 1/16 DUE TO THE PROVIDER BEING OUT OF THE OFFICE.     HUB PLEASE INFORM PT AND RESCHEDULE PTS PHYSICAL WITH DR SUMMERS. THANK YOU.

## 2023-02-10 ENCOUNTER — TELEPHONE (OUTPATIENT)
Dept: FAMILY MEDICINE CLINIC | Facility: CLINIC | Age: 78
End: 2023-02-10

## 2023-02-10 NOTE — TELEPHONE ENCOUNTER
Spoke to patient and advised her that she did not need auth for a referral to [facility]'s office. She was ok to schedule.

## 2023-02-10 NOTE — TELEPHONE ENCOUNTER
Caller: Deanne Upton    Relationship: Self    Best call back number: [phone]    What is the medical concern/diagnosis: INGROWN TOENAIL      What specialty or service is being requested: PODIATRY     What is the provider, practice or medical service name:

## 2023-02-14 ENCOUNTER — TELEPHONE (OUTPATIENT)
Dept: FAMILY MEDICINE CLINIC | Facility: CLINIC | Age: 78
End: 2023-02-14
Payer: MEDICARE

## 2023-02-14 NOTE — TELEPHONE ENCOUNTER
PT CALLED IN ABOUT HER MAMMOGRAM. PT STATES SHE DIDN'T KNOW IF SHE NEEDED A NEW REFERRAL OR IF THE REFERRAL FROM July WAS STILL GOOD TO SCHEDULE OFF OF.     PLEASE ADVISE. THANK YOU.

## 2023-02-14 NOTE — TELEPHONE ENCOUNTER
Caller: Deanne Upton    Relationship: Self    Best call back number: [phone]    What orders are you requesting (i.e. lab or imaging): MAMMOGRAM     In what timeframe would the patient need to come in: ASAP    Where will you receive your lab/imaging services: [facility]

## 2023-02-14 NOTE — TELEPHONE ENCOUNTER
The 7/13/22 order is for 3D mammogram. That must have been requested and the reason for 2 orders. This one can be used.

## 2023-02-27 ENCOUNTER — HOSPITAL ENCOUNTER (OUTPATIENT)
Dept: MAMMOGRAPHY | Facility: HOSPITAL | Age: 78
Discharge: HOME | End: 2023-02-27
Admitting: FAMILY MEDICINE
Payer: MEDICARE

## 2023-02-27 DIAGNOSIS — Z12.31 SCREENING MAMMOGRAM FOR BREAST CANCER: ICD-10-CM

## 2023-02-27 PROCEDURE — 77063 BREAST TOMOSYNTHESIS BI: CPT

## 2023-02-27 PROCEDURE — 77067 SCR MAMMO BI INCL CAD: CPT

## 2023-03-09 ENCOUNTER — TELEPHONE (OUTPATIENT)
Dept: FAMILY MEDICINE CLINIC | Facility: CLINIC | Age: 78
End: 2023-03-09

## 2023-03-09 NOTE — TELEPHONE ENCOUNTER
Caller: eDanne Upton    Relationship: Self    Best call back number: [phone]    What form or medical record are you requesting:MAMMOGRAM RESULTS     Who is requesting this form or medical record from you: SELF    How would you like to receive the form or medical records (pick-up, mail, fax): MAIL  If mail, what is the address: [address] [address]

## 2023-04-10 NOTE — PROGRESS NOTES
"Chief Complaint  No chief complaint on file.    Jason Alicea presents to [facility] for a new patient visit. She previously was cared for by another provider in our system, but now will be transferring care to our practice.    Asthma/COPD: Several urgent care visits for coughs, flu, bronchitis, COVID.  Now overall that.  After taking medication for these illnesses, had diarrhea for 3 months, took probiotics, now much better.  Takes Symbicort daily and as needed albuterol.    Diabetes: A1c of 6.6 in 2020, last A1c 9 mos ago 6.4.  Due for repeat.  Interested in diabetes education.  Ability to eat healthfully limited by fact that she does not have a stove, does have a microwave.    Concerns today:    Needs hearing aids recalibrated. Can't hear sirens, concern for safety  Need to know kindey function  Need shots for knees, wants to see new orthopod, Dr. Melgar   Desires diabetes education and to know A1c    Sees Dr. Reyna for bilateral shoulder pain/injections      Takes Simvastatin 5 mg for cholesterol, not 10 mg     She is having dysphagia: Solids and liquids are getting stuck at times.  She was prescribed acid reflux medicine, but has not taken.      Insomia- Takes Melatonin and Tylenol pm    Aymmetric LE edema noted, chronic issue, never has been evaluated    Has started walking more, 2-3 miles per day    Follows with ophthalmology for glaucoma.    Objective   Vital Signs:   Vitals:    04/18/23 1100   BP: 132/78   Pulse: 82   Temp: 97.8 °F (36.6 °C)   SpO2: 98%   Weight: 84.8 kg (187 lb)   Height: 162.6 cm (64.02\")                Physical Exam  Constitutional:       General: She is not in acute distress.     Appearance: Normal appearance. She is not toxic-appearing.   HENT:      Head: Normocephalic and atraumatic.      Mouth/Throat:      Mouth: Mucous membranes are moist.   Eyes:      General: No scleral icterus.     Conjunctiva/sclera: Conjunctivae normal. "   Cardiovascular:      Rate and Rhythm: Normal rate and regular rhythm.      Heart sounds: Normal heart sounds. No murmur heard.    No friction rub. No gallop.   Pulmonary:      Effort: Pulmonary effort is normal. No respiratory distress.      Breath sounds: Normal breath sounds.   Musculoskeletal:         General: No swelling, tenderness or deformity. Normal range of motion.      Cervical back: Normal range of motion and neck supple.      Right lower leg: Edema present.      Left lower leg: Edema present.      Comments: Asymmetric left lower extremity edema   Skin:     General: Skin is warm and dry.      Findings: No lesion or rash.   Neurological:      General: No focal deficit present.      Mental Status: She is alert and oriented to person, place, and time.   Psychiatric:         Mood and Affect: Mood normal.         Behavior: Behavior normal.         Judgment: Judgment normal.          Result Review :     The following data was reviewed by: Macarena Diaz MD on 04/18/2023:  Office Visit with Sangeetha Urbina MD (01/12/2023)  Vitamin B12 (01/09/2023 10:14)  TSH Rfx On Abnormal To Free T4 (01/09/2023 10:14)  Lipid Panel (01/09/2023 10:14)  Comprehensive Metabolic Panel (01/09/2023 10:14)  CBC & Differential (01/09/2023 10:14)  Hemoglobin A1c (01/09/2023 10:14)  Mammo Screening Digital Tomosynthesis Bilateral With CAD (02/27/2023 14:37)         Assessment and Plan    Diagnoses and all orders for this visit:    1. Type 2 diabetes mellitus with other specified complication, without long-term current use of insulin (Primary)  Assessment & Plan:  A1c today  Currently on metformin, will continue until we get A1c back to further elucidate treatment  Follows with ophthalmology for glaucoma  Urine microalbumin will be due in July  On statin, LDL at goal based on lipid panel from January 2023  Patient requesting diabetes education.  Will refer to our chronic care management team and if needed can refer to nutritionist.   Patient does have financial barriers to her care, does not have a stove either so cooking healthfully is difficult     Orders:  -     Hemoglobin A1c  -     Ambulatory Referral to Chronic Care Management    2. Bilateral hearing loss, unspecified hearing loss type  Assessment & Plan:  Audiology consultation for hearing aid recalibration    Orders:  -     Ambulatory Referral to Audiology  -     Ambulatory Referral to Chronic Care Management    3. Dysphagia, unspecified type  -     Ambulatory Referral to Gastroenterology  -     Ambulatory Referral to Chronic Care Management    4. Primary osteoarthritis of both knees  Assessment & Plan:  Requested referral to orthopedic surgery for knee injections, specifically Dr. Melgar.    Orders:  -     Ambulatory Referral to Orthopedic Surgery  -     Ambulatory Referral to Chronic Care Management    5. Edema of left lower extremity  Comments:  Asymmetric left lower extremity edema noted on exam.  Check left lower extremity ultrasound to evaluate for DVT.  Orders:  -     Duplex Venous Lower Extremity - Left CAR; Future    6. Stage 3 chronic kidney disease, unspecified whether stage 3a or 3b CKD  Assessment & Plan:  Reviewed most recent kidney function test with patient, overall stable creatinine.  Avoid NSAIDs, control diabetes and hypertension      7. Primary hypertension  Assessment & Plan:  Controlled on lisinopril-hydrochlorothiazide, continue.  BMP from January reviewed and acceptable      8. Other dysphagia  Assessment & Plan:  Dysphagia to both solids and liquids, strongly recommended GI evaluation due to concern for malignancy, strictures, etc.  Referral placed.  Also recommend starting PPI      9. Pure hyperglyceridemia  Assessment & Plan:  Patient states she is taking simvastatin 5 mg, not 10 mg.  Updated on medication list.      10. Other insomnia  Assessment & Plan:  Can continue melatonin, but recommended against diphenhydramine-containing products due to concern for  cognitive side effects      11. Routine health maintenance  Assessment & Plan:  Did not have time to address today due to more acute issues.  Address at subsequent visits.  Not due for annual wellness visits until January 2024.      Other orders  -     budesonide-formoterol (Symbicort) 160-4.5 MCG/ACT inhaler; Inhale 2 puffs 2 (Two) Times a Day.  Dispense: 18 g; Refill: 3  -     omeprazole (priLOSEC) 40 MG capsule; Take 1 capsule by mouth Daily.  Dispense: 90 capsule; Refill: 1  -     montelukast (SINGULAIR) 10 MG tablet; Take 1 tablet by mouth every night at bedtime.  Dispense: 90 tablet; Refill: 3  -     albuterol sulfate  (90 Base) MCG/ACT inhaler; Inhale 2 puffs Every 4 (Four) Hours As Needed for Wheezing.  Dispense: 8 g; Refill: 3  -     simvastatin (ZOCOR) 10 MG tablet; Take 0.5 tablets by mouth every night at bedtime.  Dispense: 90 tablet; Refill: 3      Follow Up   Return in about 6 months (around 10/18/2023) for Recheck, Next scheduled follow up.  Patient was given instructions and counseling regarding her condition or for health maintenance advice. Please see specific information pulled into the AVS if appropriate.

## 2023-04-18 ENCOUNTER — OFFICE VISIT (OUTPATIENT)
Dept: FAMILY MEDICINE CLINIC | Facility: CLINIC | Age: 78
End: 2023-04-18
Payer: MEDICARE

## 2023-04-18 VITALS
OXYGEN SATURATION: 98 % | DIASTOLIC BLOOD PRESSURE: 78 MMHG | TEMPERATURE: 97.8 F | WEIGHT: 187 LBS | HEART RATE: 82 BPM | HEIGHT: 64 IN | BODY MASS INDEX: 31.92 KG/M2 | SYSTOLIC BLOOD PRESSURE: 132 MMHG

## 2023-04-18 DIAGNOSIS — E11.69 TYPE 2 DIABETES MELLITUS WITH OTHER SPECIFIED COMPLICATION, WITHOUT LONG-TERM CURRENT USE OF INSULIN: Primary | ICD-10-CM

## 2023-04-18 DIAGNOSIS — H91.93 BILATERAL HEARING LOSS, UNSPECIFIED HEARING LOSS TYPE: ICD-10-CM

## 2023-04-18 DIAGNOSIS — M17.0 PRIMARY OSTEOARTHRITIS OF BOTH KNEES: ICD-10-CM

## 2023-04-18 DIAGNOSIS — N18.30 STAGE 3 CHRONIC KIDNEY DISEASE, UNSPECIFIED WHETHER STAGE 3A OR 3B CKD: ICD-10-CM

## 2023-04-18 DIAGNOSIS — I10 PRIMARY HYPERTENSION: ICD-10-CM

## 2023-04-18 DIAGNOSIS — G47.09 OTHER INSOMNIA: ICD-10-CM

## 2023-04-18 DIAGNOSIS — E78.1 PURE HYPERGLYCERIDEMIA: ICD-10-CM

## 2023-04-18 DIAGNOSIS — R13.10 DYSPHAGIA, UNSPECIFIED TYPE: ICD-10-CM

## 2023-04-18 DIAGNOSIS — R60.0 EDEMA OF LEFT LOWER EXTREMITY: ICD-10-CM

## 2023-04-18 DIAGNOSIS — R13.19 OTHER DYSPHAGIA: ICD-10-CM

## 2023-04-18 DIAGNOSIS — Z00.00 ROUTINE HEALTH MAINTENANCE: ICD-10-CM

## 2023-04-18 RX ORDER — BUDESONIDE AND FORMOTEROL FUMARATE DIHYDRATE 160; 4.5 UG/1; UG/1
2 AEROSOL RESPIRATORY (INHALATION)
Qty: 18 G | Refills: 3 | Status: SHIPPED | OUTPATIENT
Start: 2023-04-18

## 2023-04-18 RX ORDER — SIMVASTATIN 10 MG
5 TABLET ORAL
Qty: 90 TABLET | Refills: 3
Start: 2023-04-18

## 2023-04-18 RX ORDER — MONTELUKAST SODIUM 10 MG/1
10 TABLET ORAL
Qty: 90 TABLET | Refills: 3 | Status: SHIPPED | OUTPATIENT
Start: 2023-04-18

## 2023-04-18 RX ORDER — ALBUTEROL SULFATE 90 UG/1
2 AEROSOL, METERED RESPIRATORY (INHALATION) EVERY 4 HOURS PRN
Qty: 8 G | Refills: 3 | Status: SHIPPED | OUTPATIENT
Start: 2023-04-18

## 2023-04-18 RX ORDER — OMEPRAZOLE 40 MG/1
40 CAPSULE, DELAYED RELEASE ORAL DAILY
Qty: 90 CAPSULE | Refills: 1 | Status: SHIPPED | OUTPATIENT
Start: 2023-04-18

## 2023-04-18 NOTE — ASSESSMENT & PLAN NOTE
Reviewed most recent kidney function test with patient, overall stable creatinine.  Avoid NSAIDs, control diabetes and hypertension

## 2023-04-18 NOTE — ASSESSMENT & PLAN NOTE
Dysphagia to both solids and liquids, strongly recommended GI evaluation due to concern for malignancy, strictures, etc.  Referral placed.  Also recommend starting PPI

## 2023-04-18 NOTE — ASSESSMENT & PLAN NOTE
Did not have time to address today due to more acute issues.  Address at subsequent visits.  Not due for annual wellness visits until January 2024.

## 2023-04-18 NOTE — ASSESSMENT & PLAN NOTE
Can continue melatonin, but recommended against diphenhydramine-containing products due to concern for cognitive side effects

## 2023-04-18 NOTE — PATIENT INSTRUCTIONS
It was so nice meeting you today. I look forward to working with you on a plan to get/keep you healthy and happy!    We will call you about ultrasound of leg and referrals to audiology, gastroenterology, and orthopedic surgery. If you do hear from us in about a week, give us a call.

## 2023-04-18 NOTE — ASSESSMENT & PLAN NOTE
A1c today  Currently on metformin, will continue until we get A1c back to further elucidate treatment  Follows with ophthalmology for glaucoma  Urine microalbumin will be due in July  On statin, LDL at goal based on lipid panel from January 2023  Patient requesting diabetes education.  Will refer to our chronic care management team and if needed can refer to nutritionist.  Patient does have financial barriers to her care, does not have a stove either so cooking healthfully is difficult

## 2023-04-18 NOTE — ASSESSMENT & PLAN NOTE
Continue Symbicort, albuterol as needed, Singulair.  Had significant issues with respiratory illnesses over the fall/winter, now improved.

## 2023-04-19 ENCOUNTER — REFERRAL TRIAGE (OUTPATIENT)
Dept: CASE MANAGEMENT | Facility: OTHER | Age: 78
End: 2023-04-19
Payer: MEDICARE

## 2023-04-19 ENCOUNTER — PATIENT MESSAGE (OUTPATIENT)
Dept: CASE MANAGEMENT | Facility: OTHER | Age: 78
End: 2023-04-19
Payer: MEDICARE

## 2023-04-19 ENCOUNTER — PATIENT OUTREACH (OUTPATIENT)
Dept: CASE MANAGEMENT | Facility: OTHER | Age: 78
End: 2023-04-19
Payer: MEDICARE

## 2023-04-19 ENCOUNTER — TELEPHONE (OUTPATIENT)
Dept: FAMILY MEDICINE CLINIC | Facility: CLINIC | Age: 78
End: 2023-04-19
Payer: MEDICARE

## 2023-04-19 DIAGNOSIS — R13.19 OTHER DYSPHAGIA: Primary | ICD-10-CM

## 2023-04-19 DIAGNOSIS — E11.69 TYPE 2 DIABETES MELLITUS WITH OTHER SPECIFIED COMPLICATION, WITHOUT LONG-TERM CURRENT USE OF INSULIN: ICD-10-CM

## 2023-04-19 DIAGNOSIS — E11.8 DIABETIC COMPLICATION: ICD-10-CM

## 2023-04-19 DIAGNOSIS — M47.812 CERVICAL SPONDYLOSIS WITHOUT MYELOPATHY: ICD-10-CM

## 2023-04-19 DIAGNOSIS — N18.30 STAGE 3 CHRONIC KIDNEY DISEASE, UNSPECIFIED WHETHER STAGE 3A OR 3B CKD: ICD-10-CM

## 2023-04-19 DIAGNOSIS — Z00.00 ROUTINE HEALTH MAINTENANCE: ICD-10-CM

## 2023-04-19 DIAGNOSIS — M50.30 DEGENERATIVE DISC DISEASE, CERVICAL: ICD-10-CM

## 2023-04-19 DIAGNOSIS — M25.60 LIMITED JOINT RANGE OF MOTION: ICD-10-CM

## 2023-04-19 LAB — HBA1C MFR BLD: 6.3 % (ref 4.8–5.6)

## 2023-04-19 NOTE — OUTREACH NOTE
AMBULATORY CASE MANAGEMENT NOTE    Name and Relationship of Patient/Support Person: Lizzie Uptona GODFREY - Self    CCM Interim Update    Introduced self and CCM program to patient, - agreed to CCM assistance.      Discussed current A1C and recent labs, new orders to orthopedic for osteoarthritis and bilateral knee pain along with DDD. US for possible DVT d/t bilateral lower edema with possible doppler.  Alert and oriented x4 able to clearly make all needs known.  Stating difficulty swallowing some meats, will be sending Diabetes education along with meal prep, will include altered proteins for easier swallowing, all previous notes documenting recognition of dysphagia.      Verbalizing that she does not have a stove and she does have some possible financial stressors that she has agreed to have SW evaluate and possibly assist her with.      Sending Diabetes information and dietary considerations r/t dysphagia as well as ACP packet.  Will f/u with patient in 3 weeks on 5/10/23      Education Documentation  No documentation found.        Radha LOUISE  Ambulatory Case Management    4/19/2023, 13:53 EDT

## 2023-04-19 NOTE — TELEPHONE ENCOUNTER
Hub staff attempted to follow warm transfer process and was unsuccessful     Caller: Deanne Upton    Relationship to patient: Self    Best call back number: [phone]    Patient is needing: PATIENT WAS RETURNING A MISSED CALL FROM THE OFFICE. SHE SAYS THAT SHE TAKES A NAP FROM 3 - 4 PM EACH DAY SO CALL BEFORE OR AFTER THAT TIME.    PLEASE ADVISE.

## 2023-04-21 ENCOUNTER — TELEPHONE (OUTPATIENT)
Dept: FAMILY MEDICINE CLINIC | Facility: CLINIC | Age: 78
End: 2023-04-21
Payer: MEDICARE

## 2023-04-21 NOTE — TELEPHONE ENCOUNTER
I returned pt call with 2 attempts pt did not answer. I LM on pt vm to return call.     Hub-okay to give pt message. Thanks

## 2023-04-24 ENCOUNTER — PATIENT OUTREACH (OUTPATIENT)
Dept: CASE MANAGEMENT | Facility: OTHER | Age: 78
End: 2023-04-24
Payer: MEDICARE

## 2023-04-24 NOTE — OUTREACH NOTE
Social Work Assessment  Questions/Answers    Flowsheet Row Most Recent Value   Referral Source physician   Reason for Consult community resources, housing concerns/homeless, other (see comments)  [prepared meals]   Preferred Language English   Advance Care Planning Reviewed no concerns identified   People in Home alone   Current Living Arrangements home   Primary Care Provided by self   Employment Status retired   Source of Income social security   Application for Public Assistance pending public assistance pending number   Usual Activity Tolerance fair   Current Activity Tolerance fair   Medications independent   Meal Preparation independent   Housekeeping independent   Laundry independent   Shopping independent   Major Change/Loss/Stressor financial   Patient Personal Strengths expressive of needs   Sources of Support community support   Reaction to Health Status accepting        SDOH updated and reviewed with the patient during this program:  Financial Resource Strain: High Risk   • Difficulty of Paying Living Expenses: Hard      Food Insecurity: No Food Insecurity   • Worried About Running Out of Food in the Last Year: Never true   • Ran Out of Food in the Last Year: Never true      Transportation Needs: No Transportation Needs   • Lack of Transportation (Medical): No   • Lack of Transportation (Non-Medical): No      Housing Stability: Low Risk    • Unable to Pay for Housing in the Last Year: No   • Number of Places Lived in the Last Year: 1   • Unstable Housing in the Last Year: No        Patient Outreach    MSW outreach to patient to discuss community resource needs at the request of RN-ACM. Patient states that she is concerned about the cost of her mortgage, transmission on her car, and heating and cooling throughout her home. MSW and patient discussed outreaching to all agencies and setting up payment plan. Patient states that none of these bills are currently behind. Patient and MSW briefly discussed  outreach to [facility] for financial assistance. MSW and patient also reviewed options for new stove and refrigerator through [facility] [employer]. Patient considering this option at this time and states she will outreach to family and [employer] members who may be able to assist her. Patient and MSW also discussed prepared meals through [employer] and patient states these meals are too high sugar and high carb for her. Patient provided with MSW contact information and will follow-up with MSW as needed.     Florecita VILLEGAS -   Ambulatory Case Management    4/24/2023, 14:51 EDT

## 2023-04-27 ENCOUNTER — OFFICE VISIT (OUTPATIENT)
Dept: GASTROENTEROLOGY | Facility: CLINIC | Age: 78
End: 2023-04-27
Payer: MEDICARE

## 2023-04-27 ENCOUNTER — PATIENT OUTREACH (OUTPATIENT)
Dept: CASE MANAGEMENT | Facility: OTHER | Age: 78
End: 2023-04-27
Payer: MEDICARE

## 2023-04-27 VITALS
WEIGHT: 187.8 LBS | SYSTOLIC BLOOD PRESSURE: 114 MMHG | TEMPERATURE: 97.1 F | DIASTOLIC BLOOD PRESSURE: 75 MMHG | HEIGHT: 64 IN | HEART RATE: 97 BPM | BODY MASS INDEX: 32.06 KG/M2 | OXYGEN SATURATION: 96 %

## 2023-04-27 DIAGNOSIS — E11.69 TYPE 2 DIABETES MELLITUS WITH OTHER SPECIFIED COMPLICATION, WITHOUT LONG-TERM CURRENT USE OF INSULIN: ICD-10-CM

## 2023-04-27 DIAGNOSIS — R13.19 ESOPHAGEAL DYSPHAGIA: ICD-10-CM

## 2023-04-27 DIAGNOSIS — Z12.11 ENCOUNTER FOR SCREENING FOR MALIGNANT NEOPLASM OF COLON: Primary | ICD-10-CM

## 2023-04-27 DIAGNOSIS — N18.30 STAGE 3 CHRONIC KIDNEY DISEASE, UNSPECIFIED WHETHER STAGE 3A OR 3B CKD: Primary | ICD-10-CM

## 2023-04-27 NOTE — OUTREACH NOTE
[facility] End of Month Documentation    This Chronic Medical Management Care Plan for Deanne Upton, 78 y.o. female, has been monitored and managed; reviewed and a new plan of care implemented for the month of April.  A cumulative time of 23  minutes was spent on this patient record this month, including phone call with patient; chart review; electronic communication with primary care provider; electronic communication with other providers, patient does have ACP but it is old and is requesting more information, CCM assistance/ appointments and provider f/u/ SW assistance.    Regarding the patient's problems: has Atopic rhinitis; Anxiety; Asthma; Bunion; Depression; Functional murmur; Bilateral hearing loss; Hypertension; Osteoarthritis of knee; Pure hyperglyceridemia; Skin neoplasm; Type 2 diabetes mellitus; Vitamin D deficiency; Vaginal prolapse; Morbid obesity; Degenerative disc disease, cervical; Limited joint range of motion; Chronic neck pain; Cervical spondylosis without myelopathy; Occipital neuralgia of left side; Open angle with borderline findings, low risk, bilateral; Presbyopia; Primary open angle glaucoma; CKD (chronic kidney disease) stage 3, GFR 30-59 ml/min; Cervical radiculopathy; Other dysphagia; Other insomnia; and Routine health maintenance on their problem list., the following items were addressed: medical records; medications; referrals to community service providers, patient safety and wellness/ continuity of care/ access to resources and any changes can be found within the plan section of the note.  A detailed listing of time spent for chronic care management is tracked within each outreach encounter.  Current medications include:  has a current medication list which includes the following prescription(s): acetaminophen, albuterol sulfate hfa, b complex + c tr, budesonide-formoterol, cetirizine hcl, vitamin d, diphenhydramine-acetaminophen, krill oil, lisinopril-hydrochlorothiazide, lumigan,  melatonin, metformin, mometasone, montelukast, multiple vitamin, omeprazole, peak flow meter, simvastatin, and spacer/aero chamber mouthpiece. and the patient is reported to be patient is compliant with medication protocol,  Medications are reported to be effective.  Regarding these diagnoses, referrals were made to the following provider(s):  Orthopedic surgery, GI, Audiology.  All notes on chart for PCP to review.    The patient was monitored remotely for blood glucose; medications, does not routinely monitor FBS d/t her previous job.    The patient's physical needs include:  physical healthcare.     The patient's mental support needs include:  coordination of community providers    The patient's cognitive support needs include:  not applicable    The patient's psychosocial support needs include:  not applicable    The patient's functional needs include: physical healthcare; resources for disability needs, access to resources/ financial stresses    The patient's environmental needs include:  not applicable, no stove referral out to SW/ possible financial stressers    Care Plan overall comments:  No data recorded    Refer to previous outreach notes for more information on the areas listed above.    Monthly Billing Diagnoses  (N18.30) Stage 3 chronic kidney disease, unspecified whether stage 3a or 3b CKD    (E11.69) Type 2 diabetes mellitus with other specified complication, without long-term current use of insulin    Medications   · Medications have been reconciled    Care Plan progress this month:      Recently Modified Care Plans Updates made since 3/27/2023 12:00 AM     Diabetes Type 2 (Adult)         Problem Priority Last Modified     Glycemic Management (Diabetes, Type 2) --  4/19/2023  2:29 PM by Radha Mcfarland, RN              Goal Recent Progress Last Modified     Glycemic Management Optimized --  4/19/2023  2:29 PM by Radha Mcfarland, RN     Evidence-based guidance:   Anticipate A1C testing (point-of-care)  every 3 to 6 months based on goal attainment.   Review mutually-set A1C goal or target range.   Anticipate use of antihyperglycemic with or without insulin and periodic adjustments; consider active involvement of pharmacist.   Provide medical nutrition therapy and development of individualized eating.   Compare self-reported symptoms of hypo or hyperglycemia to blood glucose levels, diet and fluid intake, current medications, psychosocial and physiologic stressors, change in activity and barriers to care adherence.   Promote self-monitoring of blood glucose levels.   Assess and address barriers to management plan, such as food insecurity, age, developmental ability, depression, anxiety, fear of hypoglycemia or weight gain, as well as medication cost, side effects and complicated regimen.   Consider referral to community-based diabetes education program, visiting nurse, community health worker or health .   Encourage regular dental care for treatment of periodontal disease; refer to dental provider when needed.    Notes:            Task Due Date Last Modified     Alleviate Barriers to Glycemic Management --  4/19/2023  2:30 PM by Radha Mcfarland RN     Care Management Activities:      - A1C testing facilitated  - barriers to adherence to treatment plan identified  - blood glucose monitoring encouraged  - mutual A1C goal set or reviewed  - self-awareness of signs/symptoms of hypo or hyperglycemia encouraged  - use of blood glucose monitoring log promoted      Notes:              Problem Priority Last Modified     Disease Progression (Diabetes, Type 2) --  4/19/2023  2:29 PM by Radha Mcfarland RN              Goal Recent Progress Last Modified     Disease Progression Prevented or Minimized --  4/19/2023  2:29 PM by Radha Mcfarland, RN     Evidence-based guidance:   Prepare patient for laboratory and diagnostic exams based on risk and presentation.   Encourage lifestyle changes, such as increased intake of plant-based  foods, stress reduction, consistent physical activity and smoking cessation to prevent long-term complications and chronic disease.    Individualize activity and exercise recommendations while considering potential limitations, such as neuropathy, retinopathy or the ability to prevent hyperglycemia or hypoglycemia.    Prepare patient for use of pharmacologic therapy that may include antihypertensive, analgesic, prostaglandin E1 with periodic adjustments, based on presenting chronic condition and laboratory results.   Assess signs/symptoms and risk factors for hypertension, sleep-disordered breathing, neuropathy (including changes in gait and balance), retinopathy, nephropathy and sexual dysfunction.   Address pregnancy planning and contraceptive choice, especially when prescribing antihypertensive or statin.   Ensure completion of annual comprehensive foot exam and dilated eye exam.    Implement additional individualized goals and interventions based on identified risk factors.   Prepare patient for consultation or referral for specialist care, such as ophthalmology, neurology, cardiology, podiatry, nephrology or perinatology.    Notes:            Task Due Date Last Modified     Monitor and Manage Follow-up for Comorbidities --  4/19/2023  2:30 PM by Radha Mcfarland RN     Care Management Activities:      - activity based on tolerance and functional limitations encouraged  - healthy lifestyle promoted      Notes:                 Osteoarthritis (Adult)         Problem Priority Last Modified     Pain (Osteoarthritis) --  4/19/2023  2:29 PM by Radha Mcfarland RN              Goal Recent Progress Last Modified     Manage Pain --  4/19/2023  2:29 PM by Radha Mcfarland RN     Evidence-based guidance:   Develop a mutual, multimodal pain management plan with patient and caregiver; determine person's knowledge of the condition, any concerns or fears, personal preferences and ability to access services.   Assess pain level,  treatment efficacy and patient response at regular intervals using a consistent pain scale; review the effectiveness and tolerability of all treatments.   Correlate pain with impact on daily activities, occupation, mood, sleep quality, comorbidities, other (nonarthritic) pain, fall risk and quality of life.   Encourage nonpharmacologic measures, such as applied heat or cold, exercise, physical or occupational therapy, orthoses, cognitive behavioral therapy, yoga, christy-chi, acupuncture, adequate sleep and weight loss.   Provide anticipatory guidance regarding benefits to multimodal treatment that improves quality of life.   Consider the use of transcutaneous TENS (electrical nerve stimulation), shock-absorbing footwear, orthoses, mineral baths, nutraceuticals or electromagnetic-field therapy.   Prepare patient for individualized pharmacologic pain management in a stepped approach based on presentation, risk and comorbidities; monitor and manage side effects and anticipate periodic adjustments.   Promote individualized plan to stay active when unable to participate in physical therapy, such as passive and active range of motion, yoga, walking, water aerobics or swimming.   Support and optimize psychosocial response to pain.    Notes:            Task Due Date Last Modified     Facilitate Multimodal Pain Management Plan --  4/19/2023  2:31 PM by Radha Mcfarland RN     Care Management Activities:      - complementary therapy use encouraged  - healthy lifestyle promoted  - pain assessed      Notes:              Problem Priority Last Modified     Mobility and Function (Osteoarthritis) --  4/19/2023  2:29 PM by Radha Mcfarland RN              Goal Recent Progress Last Modified     Maintain Mobility and Function --  4/19/2023  2:29 PM by Radha Mcfarland RN     Evidence-based guidance:   Acknowledge and validate impact of pain, loss of strength and potential disfigurement (hand osteoarthritis) on mental health and daily life,  "such as social isolation, anxiety, depression, impaired sexual relationship and    injury from falls.   Anticipate referral to physical or occupational therapy for assessment, therapeutic exercise and recommendation for adaptive equipment or assistive devices; encourage participation.   Assess impact on ability to perform activities of daily living, as well as engage in sports and leisure events or requirements of work or school.   Provide anticipatory guidance and reassurance about the benefit of exercise to maintain function; acknowledge and normalize fear that exercise may worsen symptoms.   Encourage regular exercise, at least 10 minutes at a time for 45 minutes per week; consider yoga, water exercise and proprioceptive exercises; encourage use of wearable activity tracker to increase motivation and adherence.   Encourage maintenance or resumption of daily activities, including employment, as pain allows and with minimal exposure to trauma.   Assist patient to advocate for adaptations to the work environment.   Consider level of pain and function, gender, age, lifestyle, patient preference, quality of life, readiness and  €œcapacity to benefit\" when recommending patients for orthopaedic surgery consultation.   Explore strategies, such as changes to medication regimen or activity that enables patient to anticipate and manage flare-ups that increase deconditioning and disability.   Explore patient preferences; encourage exposure to a broader range of activities that have been avoided for fear of experiencing pain.   Identify barriers to participation in therapy or exercise, such as pain with activity, anticipated or imagined pain.   Monitor postoperative joint replacement or any preexisting joint replacement for ongoing pain and loss of function; provide social support and encouragement throughout recovery.    Notes:            Task Due Date Last Modified     Maintain or Improve Strength and Functional Ability --  " 4/19/2023  2:31 PM by Radha Mcfarland RN     Care Management Activities:      - barriers to physical activity or exercise addressed      Notes:              Problem Priority Last Modified     Harm or Injury (Osteoarthritis) --  4/19/2023  2:29 PM by Radha Mcfarland RN              Goal Recent Progress Last Modified     Harm or Injury Prevented --  4/19/2023  2:29 PM by Radha Mcfarland RN     Evidence-based guidance:   Assess fall risk related to postural control, balance and gait impairment, muscle weakness, diminished vision and hearing, presence of incontinence, environmental hazards and effects of medication.   Address potential barriers to activity or exercise, such as low self-efficacy and fear of falling.   Address fall risk by considering single vison versus multifocal lenses (glasses) during activity, environmental modification and antislip, flat-heeled shoes, use of orthoses and assistive devices.   Promote regular exercise focused on improving strength, based on ability and tolerance; consider activities, such as yoga or christy chi, that promote balance.   Review current medication; consider vitamin D supplementation and de-prescription of psychotropic medication.   Monitor and address analgesic use, such as acetaminophen, nonsteroidal anti-inflammatory agent or opioid, for complications that may include hepatotoxicity, dependence, constipation, dyspepsia, gastrointestinal bleeding, heart or    renal disease.   Monitor depression, anxiety and suicide risk.   Collaborate with patient and parent/caregiver to develop a safety plan or coping action plan to reduce suicide risk.   Include recognition of warning signs of an impending suicidal crisis, restricting access to lethal means, use of internal coping strategies and contact methods for mental health professional or agency.    Notes:            Task Due Date Last Modified     Identify and Reduce Risk to Safety --  4/19/2023  2:31 PM by Radha Mcfarland RN     Care  Management Activities:      - not discussed during this outreach      Notes:                 Community Resources         Problem Priority Last Modified     Limited Financial Resources --  4/19/2023  2:29 PM by Radha Mcfarland RN              Goal Recent Progress Last Modified     Establish options for financial assistance --  4/19/2023  2:29 PM by Radha Mcfarland RN              Task Due Date Last Modified     Discuss financial planning with patient --  4/19/2023  2:29 PM by Radha Mcfarland RN        Refer patient to social work --  4/19/2023  2:32 PM by Radha Mcfarland RN        Provide community resources for financial assistance --  4/19/2023  2:31 PM by Radha Mcfarland RN              Problem Priority Last Modified     Lack of Transportation --  4/19/2023  2:29 PM by Radha Mcfarland RN              Goal Recent Progress Last Modified     Establish Reliable Transportation --  4/19/2023  2:29 PM by Radha Mcfarland RN              Task Due Date Last Modified     Discuss current transportation plan with patient --  4/19/2023  2:29 PM by Radha Mcfarland RN        Assign community resources for transportation assistance --  4/19/2023  2:29 PM by Radha Mcfarland RN        Provide community resources for financial assistance --  4/19/2023  2:29 PM by Radha Mcfarland RN              Problem Priority Last Modified     Reliable food source --  4/19/2023  2:29 PM by Radha Mcfarland RN              Goal Recent Progress Last Modified     Establish Options for Affordable Food Sources --  4/19/2023  2:29 PM by Radha Mcfarland RN              Task Due Date Last Modified     Refer patient to food insecurity resources from resource directory --  4/19/2023  2:29 PM by Radha Mcfarland RN        Discuss financial planning with patient --  4/19/2023  2:29 PM by Radha Mcfarlnad RN        Discuss available support programs for affordable food --  4/19/2023  2:29 PM by Radha Mcfarland RN        Refer patient to social work --  4/19/2023  2:29 PM by  Radha Mcfarland RN              Problem Priority Last Modified     Unable to prepare meals --  4/19/2023  2:29 PM by Radha Mcfarland RN              Goal Recent Progress Last Modified     Identify Options for Meal Assistance --  4/19/2023  2:29 PM by Radha Mcfarland RN              Task Due Date Last Modified     Refer patient to social work --  4/19/2023  2:32 PM by Radha Mcfarland RN        Refer patient to meal assistance programs from [facility] --  4/19/2023  2:29 PM by Radha Mcfarland RN        Discuss social and family support with patient --  4/19/2023  2:32 PM by Radha Mcfarland RN              Goal Recent Progress Last Modified     Learn how to prepare reliable meals --  4/19/2023  2:29 PM by Radha Mcfarland RN              Task Due Date Last Modified     Refer patient to social work --  4/19/2023  2:29 PM by Radha Mcfarland RN        Discuss barriers to meal prep with patient --  4/19/2023  2:29 PM by Radha Mcfarland RN        Educate patient on simple meal prep at home --  4/19/2023  2:29 PM by Radha Mcfarland RN                          Instructions   · Patient was provided an electronic copy of care plan  · CCM services were explained and offered and patient has accepted these services.  · Patient has given their written consent to receive CCM services and understands that this includes the authorization of electronic communication of medical information with the other treating providers.  · Patient understands that they may stop CCM services at any time and these changes will be effective at the end of the calendar month and will effectively revocate the agreement of CCM services.  · Patient understands that only one practitioner can furnish and be paid for CCM services during one calendar month.  Patient also understands that there may be co-payment or deductible fees in association with CCM services.  · Patient will continue with at least monthly follow-up calls with the Ambulatory Case  Manager.    Radha LOUISE  Ambulatory Case Management    4/27/2023, 10:49 EDT

## 2023-04-27 NOTE — PROGRESS NOTES
Chief Complaint   Patient presents with   • Abdominal Pain     Epigastric pain    • Difficulty Swallowing     Deanne Upton is a 78 y.o. female who presents with a history of dysphagia  HPI     Patient 78-year-old female with history of hypertension, hyperlipidemia and diabetes presenting for evaluation of dysphagia.  Patient reports food seems to get stuck in the sternal region.  Patient reports sometimes associated with some epigastric pain.  Patient reports typically it happens with more dense foods like hamburger but occasionally happens just by drinking liquid.  Patient denies any vomiting and once the food passes she is able to begin eating again without discomfort.  Patient now here for further recommendations.  Patient does report a history of heartburn and reflux but seems to be more stress related.  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  Patient last screening colonoscopy was in 2011.    Past Medical History:   Diagnosis Date   • Allergic    • Anxiety    • Asthma    • Breast asymmetry    • Diabetes mellitus type 2, controlled    • Emphysema of lung    • Glaucoma    • Headache    • Hyperlipemia    • Hypertension    • Injury of neck    • Neck pain    • Osteoarthritis    • Peripheral neuropathy    • Shortness of breath    • Sinusitis    • Vitamin A deficiency        Current Outpatient Medications:   •  acetaminophen (TYLENOL) 650 MG 8 hr tablet, Take 1 tablet by mouth 3 (Three) Times a Day., Disp: , Rfl:   •  albuterol sulfate  (90 Base) MCG/ACT inhaler, Inhale 2 puffs Every 4 (Four) Hours As Needed for Wheezing., Disp: 8 g, Rfl: 3  •  B Complex-C-Folic Acid (B COMPLEX + C TR) tablet controlled-release, Take by mouth., Disp: , Rfl:   •  budesonide-formoterol (Symbicort) 160-4.5 MCG/ACT inhaler, Inhale 2 puffs 2 (Two) Times a Day., Disp: 18 g, Rfl: 3  •  Cetirizine HCl 10 MG  capsule, Take 10 mg by mouth 2 (Two) Times a Day., Disp: , Rfl:   •  Cholecalciferol (VITAMIN D) 2000 UNITS capsule, Take 3 capsules by mouth., Disp: , Rfl:   •  diphenhydrAMINE-acetaminophen (TYLENOL PM)  MG tablet per tablet, Take 1 tablet by mouth At Night As Needed for Sleep., Disp: , Rfl:   •  Krill Oil 350 MG capsule, Take  by mouth Daily., Disp: , Rfl:   •  lisinopril-hydrochlorothiazide (PRINZIDE,ZESTORETIC) 10-12.5 MG per tablet, Take 1 tablet by mouth Daily., Disp: 90 tablet, Rfl: 3  •  Lumigan 0.01 % ophthalmic drops, Administer 1 drop to both eyes Every Night., Disp: , Rfl:   •  melatonin 5 MG tablet tablet, Take 2 tablets by mouth Every Night., Disp: , Rfl:   •  metFORMIN (GLUCOPHAGE) 500 MG tablet, Take 1 tablet by mouth 2 (Two) Times a Day With Meals., Disp: 180 tablet, Rfl: 3  •  mometasone (NASONEX) 50 MCG/ACT nasal spray, 2 sprays into the nostril(s) as directed by provider Daily., Disp: , Rfl:   •  montelukast (SINGULAIR) 10 MG tablet, Take 1 tablet by mouth every night at bedtime. (Patient taking differently: Take 1 tablet by mouth Every Morning.), Disp: 90 tablet, Rfl: 3  •  Multiple Vitamin (MULTI VITAMIN DAILY PO), Take by mouth., Disp: , Rfl:   •  Peak Flow Meter device, Use to assess breathing, Disp: 1 each, Rfl: 0  •  simvastatin (ZOCOR) 10 MG tablet, Take 0.5 tablets by mouth every night at bedtime., Disp: 90 tablet, Rfl: 3  •  Spacer/Aero Chamber Mouthpiece misc, Use with inhalers for every breathing treatment, Disp: 1 each, Rfl: 1  •  omeprazole (priLOSEC) 40 MG capsule, Take 1 capsule by mouth Daily. (Patient not taking: Reported on 4/27/2023), Disp: 90 capsule, Rfl: 1  Allergies   Allergen Reactions   • Crocus Anaphylaxis   • Prednisone Diarrhea   • Doxycycline Diarrhea   • Ibuprofen Swelling     Lips swell   • Naproxen GI Intolerance     Can't remember    • Nsaids GI Intolerance     Was having some kidney problems   • Opium Headache   • Tramadol GI Intolerance     Visual  complaint (light show with eyes closes), joint pain, drowsy,bloody stool with diarrhea!!!!!!!     Social History     Socioeconomic History   • Marital status:    • Number of children: 1   Tobacco Use   • Smoking status: Never     Passive exposure: Never   • Smokeless tobacco: Never   • Tobacco comments:     she smoked 1 pack in her life   Vaping Use   • Vaping Use: Never used   Substance and Sexual Activity   • Alcohol use: Yes     Comment: twice a year    • Drug use: No   • Sexual activity: Defer     Birth control/protection: None     Family History   Problem Relation Age of Onset   • Thyroid disease Mother         mother was murdered at age 60   • Hypertension Mother    • Stroke Mother    • Lung cancer Father      Review of Systems   Constitutional: Negative.    HENT: Positive for trouble swallowing. Negative for sinus pressure, sneezing, sore throat, tinnitus and voice change.    Eyes: Negative.    Respiratory: Negative.    Cardiovascular: Negative.    Gastrointestinal: Positive for abdominal pain. Negative for abdominal distention, anal bleeding, blood in stool, constipation, diarrhea, nausea, rectal pain and vomiting.   Endocrine: Negative.    Musculoskeletal: Negative.    Skin: Negative.    Allergic/Immunologic: Negative.    Hematological: Negative.      Vitals:    04/27/23 1127   BP: 114/75   Pulse: 97   Temp: 97.1 °F (36.2 °C)   SpO2: 96%     Physical Exam  Vitals and nursing note reviewed.   Constitutional:       Appearance: Normal appearance. She is well-developed.   HENT:      Head: Normocephalic and atraumatic.   Eyes:      General: No scleral icterus.     Pupils: Pupils are equal, round, and reactive to light.   Cardiovascular:      Rate and Rhythm: Normal rate and regular rhythm.      Heart sounds: Normal heart sounds. No murmur heard.    No friction rub. No gallop.   Pulmonary:      Effort: Pulmonary effort is normal. No respiratory distress.      Breath sounds: Normal breath sounds.    Abdominal:      General: Bowel sounds are normal. There is no distension or abdominal bruit.      Palpations: Abdomen is soft. Abdomen is not rigid. There is no shifting dullness, fluid wave, mass or pulsatile mass.      Tenderness: There is no abdominal tenderness. There is no guarding.      Hernia: No hernia is present.   Musculoskeletal:         General: No swelling or tenderness. Normal range of motion.   Skin:     General: Skin is warm and dry.      Coloration: Skin is not jaundiced.      Findings: No rash.   Neurological:      General: No focal deficit present.      Mental Status: She is alert and oriented to person, place, and time.      Cranial Nerves: No cranial nerve deficit.   Psychiatric:         Behavior: Behavior normal.         Thought Content: Thought content normal.       Diagnoses and all orders for this visit:    Encounter for screening for malignant neoplasm of colon  -     Case Request; Standing  -     Implement Anesthesia orders day of procedure.; Standing  -     Obtain informed consent; Standing  -     Case Request    Esophageal dysphagia  -     FL Esophagram Complete; Future    Patient 78-year-old female with history of diabetes, hypertension, hyperlipidemia presenting with dysphagia.  Patient reports mostly dense foods like hamburger because her issues with getting stuck in the mid chest.  Patient reports though sometimes with liquids will do this as well.  Patient reports once it passes she is able to eat again.  Patient was recommended to begin omeprazole which apparently she used in the distant past for reflux which improved when stress got better in her life.  Unfortunately patient has not yet started it but was recommended to begin today.  Patient also overdue for colonoscopy screening.  Patient reports she was sent a Cologuard but was unable to follow the directions.  At this point would recommend proceeding with colonoscopy but will arrange an esophagram to evaluate for esophageal  stenosis or stricture.  If she symptomatically improves and no abnormality found on esophagram we will just proceed with colonoscopy if however there are findings on the esophagram that suggests intervention will perform the EGD and colonoscopy together.  We will follow-up clinically based on x-ray findings.

## 2023-04-28 ENCOUNTER — TELEPHONE (OUTPATIENT)
Dept: GASTROENTEROLOGY | Facility: CLINIC | Age: 78
End: 2023-04-28
Payer: MEDICARE

## 2023-04-28 NOTE — TELEPHONE ENCOUNTER
OK FOR HUB TO READ  PT [facility] FOR COLONOSCOPY ON 6/16/23  MAILED MIRALAX PREP MA IS AWARE OF NEEDING A  [facility] W/ DANNY

## 2023-05-05 ENCOUNTER — HOSPITAL ENCOUNTER (OUTPATIENT)
Dept: CARDIOLOGY | Facility: HOSPITAL | Age: 78
Discharge: HOME | End: 2023-05-05
Payer: MEDICARE

## 2023-05-05 DIAGNOSIS — R60.0 EDEMA OF LEFT LOWER EXTREMITY: ICD-10-CM

## 2023-05-05 LAB
BH CV LOWER VASCULAR LEFT COMMON FEMORAL AUGMENT: NORMAL
BH CV LOWER VASCULAR LEFT COMMON FEMORAL COMPETENT: NORMAL
BH CV LOWER VASCULAR LEFT COMMON FEMORAL COMPRESS: NORMAL
BH CV LOWER VASCULAR LEFT COMMON FEMORAL PHASIC: NORMAL
BH CV LOWER VASCULAR LEFT COMMON FEMORAL SPONT: NORMAL
BH CV LOWER VASCULAR LEFT DISTAL FEMORAL COMPRESS: NORMAL
BH CV LOWER VASCULAR LEFT GASTRONEMIUS COMPRESS: NORMAL
BH CV LOWER VASCULAR LEFT GREATER SAPH AK COMPRESS: NORMAL
BH CV LOWER VASCULAR LEFT GREATER SAPH BK COMPRESS: NORMAL
BH CV LOWER VASCULAR LEFT LESSER SAPH COMPRESS: NORMAL
BH CV LOWER VASCULAR LEFT MID FEMORAL AUGMENT: NORMAL
BH CV LOWER VASCULAR LEFT MID FEMORAL COMPETENT: NORMAL
BH CV LOWER VASCULAR LEFT MID FEMORAL COMPRESS: NORMAL
BH CV LOWER VASCULAR LEFT MID FEMORAL PHASIC: NORMAL
BH CV LOWER VASCULAR LEFT MID FEMORAL SPONT: NORMAL
BH CV LOWER VASCULAR LEFT PERONEAL COMPRESS: NORMAL
BH CV LOWER VASCULAR LEFT POPLITEAL AUGMENT: NORMAL
BH CV LOWER VASCULAR LEFT POPLITEAL COMPETENT: NORMAL
BH CV LOWER VASCULAR LEFT POPLITEAL COMPRESS: NORMAL
BH CV LOWER VASCULAR LEFT POPLITEAL PHASIC: NORMAL
BH CV LOWER VASCULAR LEFT POPLITEAL SPONT: NORMAL
BH CV LOWER VASCULAR LEFT POSTERIOR TIBIAL COMPRESS: NORMAL
BH CV LOWER VASCULAR LEFT PROFUNDA FEMORAL COMPRESS: NORMAL
BH CV LOWER VASCULAR LEFT PROXIMAL FEMORAL COMPRESS: NORMAL
BH CV LOWER VASCULAR LEFT SAPHENOFEMORAL JUNCTION COMPRESS: NORMAL
BH CV LOWER VASCULAR RIGHT COMMON FEMORAL AUGMENT: NORMAL
BH CV LOWER VASCULAR RIGHT COMMON FEMORAL COMPETENT: NORMAL
BH CV LOWER VASCULAR RIGHT COMMON FEMORAL COMPRESS: NORMAL
BH CV LOWER VASCULAR RIGHT COMMON FEMORAL PHASIC: NORMAL
BH CV LOWER VASCULAR RIGHT COMMON FEMORAL SPONT: NORMAL
BH CV POP FLUID COLLECT LEFT: 1
MAXIMAL PREDICTED HEART RATE: 143 BPM
STRESS TARGET HR: 122 BPM

## 2023-05-05 PROCEDURE — 93971 EXTREMITY STUDY: CPT

## 2023-05-08 ENCOUNTER — TELEPHONE (OUTPATIENT)
Dept: FAMILY MEDICINE CLINIC | Facility: CLINIC | Age: 78
End: 2023-05-08

## 2023-05-08 NOTE — TELEPHONE ENCOUNTER
Caller:     Relationship:     Best call back number: [phone]  What is the best time to reach you: ANYTIME   Who are you requesting to speak with (clinical staff, provider,  specific staff member):CLINICAL STAFF   Do you know the name of the person who called: SELF   What was the call regarding: REFERRAL SHOTS IN  KNEES

## 2023-05-11 ENCOUNTER — TELEPHONE (OUTPATIENT)
Dept: FAMILY MEDICINE CLINIC | Facility: CLINIC | Age: 78
End: 2023-05-11
Payer: MEDICARE

## 2023-05-11 NOTE — TELEPHONE ENCOUNTER
----- Message from Macarena Diaz MD sent at 5/5/2023  4:30 PM EDT -----  Please notify the patient that she doesn't have a blood clot in her leg, but she does have a fluid collection behind her knee--likely from arthritis. Nothing to do about that unless she is having knee pain--in which case I'd recommend referral to orthopedics.

## 2023-06-05 ENCOUNTER — TELEPHONE (OUTPATIENT)
Dept: CASE MANAGEMENT | Facility: OTHER | Age: 78
End: 2023-06-05
Payer: MEDICARE

## 2023-06-06 ENCOUNTER — TELEPHONE (OUTPATIENT)
Dept: GASTROENTEROLOGY | Facility: CLINIC | Age: 78
End: 2023-06-06

## 2023-06-06 ENCOUNTER — PATIENT OUTREACH (OUTPATIENT)
Dept: CASE MANAGEMENT | Facility: OTHER | Age: 78
End: 2023-06-06
Payer: MEDICARE

## 2023-06-06 DIAGNOSIS — E11.69 TYPE 2 DIABETES MELLITUS WITH OTHER SPECIFIED COMPLICATION, WITHOUT LONG-TERM CURRENT USE OF INSULIN: Primary | ICD-10-CM

## 2023-06-06 NOTE — OUTREACH NOTE
AMBULATORY CASE MANAGEMENT NOTE    Name and Relationship of Patient/Support Person: Won Deanne K - Self    CCM Interim Update    Call returned from patient, regarding recent UC visit.  States still has congestion, and cough.  Verbalizing that she has been afebrile and staying well hydrated.    Requested assistance with medication costs.  Verbalizing Lumigan and Symbicort insurance costs too much to afford.  Also stating that she will need to reschedule Esophagram and colonoscopy d/t costs.    Stating POA is her daughter, Living will is being updated and offered to provide assistance, forms were sent on previous outreach.  States that she has been trying to reach MSW regarding an irrevocable trust.      Plan:     Review Diabetes information/ dietary considerations previously provided as well as follow up on ACP assistance and SW assistance     Will follow up: 7/3/23        Education Documentation  No documentation found.        Radha LOUISE  Ambulatory Case Management    6/6/2023, 15:20 EDT

## 2023-06-06 NOTE — TELEPHONE ENCOUNTER
Caller: Deanne Upton    Relationship: Self    Best call back number: [phone]    What is the best time to reach you: ANY    Who are you requesting to speak with (clinical staff, provider,  specific staff member): CLINICAL STAFF    What was the call regarding:  PT HAS QUESTIONS ABOUT COSTS OF 6/16/23 COLONOSCOPY AND THE ESOPHAGEAL TESTING ON  6/12/23. SHE IS NEEDING TO GET THE DETAILS OF THIS TODAY BECAUSE SHE IS OUT OF FUNDING.      GAVE PERMISSION TO LEAVE DETAIL MESSAGE ON VOICEMAIL     PLEASE CALL AS SOON AS CAN.

## 2023-06-07 ENCOUNTER — TELEPHONE (OUTPATIENT)
Dept: FAMILY MEDICINE CLINIC | Facility: CLINIC | Age: 78
End: 2023-06-07
Payer: MEDICARE

## 2023-06-07 ENCOUNTER — TELEPHONE (OUTPATIENT)
Dept: GASTROENTEROLOGY | Facility: CLINIC | Age: 78
End: 2023-06-07

## 2023-06-07 ENCOUNTER — TELEPHONE (OUTPATIENT)
Dept: GASTROENTEROLOGY | Facility: CLINIC | Age: 78
End: 2023-06-07
Payer: MEDICARE

## 2023-06-07 NOTE — TELEPHONE ENCOUNTER
I called pt pt states her hand is fine she states that she wanted to know if she was due for a shot. Pt was informed that she is up to date on her TDAP. Thanks

## 2023-06-07 NOTE — TELEPHONE ENCOUNTER
OK FOR HUB TO READ  PT CALLED TO CANCEL SCOPE CALLED PT LVM FOR HER TO CALL BACK AND RS PLACED IN DEPOT W/ AMANDA
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
## 2023-06-07 NOTE — TELEPHONE ENCOUNTER
Caller: Deanne Upton    Relationship to patient: Self    Best call back number: [phone]    Chief complaint: PATIENT CANCELED HER X-RAY ON 06/12/23 AND ALSO WAS CALLING BACK TO RESCHEDULE HER SCOPES ON 06/16/23. SHE STATED THAT SHE HAS TOO MANY THINGS GOING ON MONE[employer] [place]ISE THAT NEEDED TO GET TAKEN CARE OF FIRST. PLEASE CALL PATIENT BACK. UNABLE TO WARM TRANSFER.        If rescheduling, when is the original appointment: 06/16/23

## 2023-06-07 NOTE — TELEPHONE ENCOUNTER
Hub staff attempted to follow warm transfer process and was unsuccessful     Caller: Deanne Upton    Relationship to patient: Self    Best call back number: [phone]    Patient is needing: PT CALLED TO CANCEL 6/12 AND 6/16 APPTS BECAUSE SHE WAS TO BE CALLED YESTERDAY TO REVIEW COST AND HAD NOT RECEIVED CALL SO REQUEST THAT WE NOW CANCEL BOTH APPTS.   THANK YOU.

## 2023-08-14 ENCOUNTER — OFFICE VISIT (OUTPATIENT)
Dept: FAMILY MEDICINE CLINIC | Facility: CLINIC | Age: 78
End: 2023-08-14
Payer: MEDICARE

## 2023-08-14 VITALS
HEIGHT: 63 IN | WEIGHT: 189 LBS | HEART RATE: 82 BPM | DIASTOLIC BLOOD PRESSURE: 79 MMHG | BODY MASS INDEX: 33.49 KG/M2 | SYSTOLIC BLOOD PRESSURE: 122 MMHG | TEMPERATURE: 98.6 F | RESPIRATION RATE: 16 BRPM | OXYGEN SATURATION: 100 %

## 2023-08-14 DIAGNOSIS — E78.1 PURE HYPERGLYCERIDEMIA: ICD-10-CM

## 2023-08-14 DIAGNOSIS — Z13.820 SCREENING FOR OSTEOPOROSIS: ICD-10-CM

## 2023-08-14 DIAGNOSIS — Z09 ENCOUNTER FOR FOLLOW-UP EXAMINATION AFTER COMPLETED TREATMENT FOR CONDITIONS OTHER THAN MALIGNANT NEOPLASM: ICD-10-CM

## 2023-08-14 DIAGNOSIS — N18.31 TYPE 2 DIABETES MELLITUS WITH STAGE 3A CHRONIC KIDNEY DISEASE, WITHOUT LONG-TERM CURRENT USE OF INSULIN: ICD-10-CM

## 2023-08-14 DIAGNOSIS — I10 PRIMARY HYPERTENSION: Primary | ICD-10-CM

## 2023-08-14 DIAGNOSIS — R60.0 BILATERAL LOWER EXTREMITY EDEMA: ICD-10-CM

## 2023-08-14 DIAGNOSIS — E11.22 TYPE 2 DIABETES MELLITUS WITH STAGE 3A CHRONIC KIDNEY DISEASE, WITHOUT LONG-TERM CURRENT USE OF INSULIN: ICD-10-CM

## 2023-08-14 PROCEDURE — 3078F DIAST BP <80 MM HG: CPT

## 2023-08-14 PROCEDURE — 99214 OFFICE O/P EST MOD 30 MIN: CPT

## 2023-08-14 PROCEDURE — 1160F RVW MEDS BY RX/DR IN RCRD: CPT

## 2023-08-14 PROCEDURE — 3074F SYST BP LT 130 MM HG: CPT

## 2023-08-14 NOTE — PROGRESS NOTES
Chief Complaint  Establish Care, Diabetes, Hypertension, and Hyperlipidemia    Subjective         History of Present Illness    Deanne Upton 78 y.o. female presents today for a new patient appointment.  She is here to establish care and is a new patient to me.  I reviewed the PFSH recorded today by my MA/LPN staff.       She has Type 2 DM.  Last hemoglobin A1c was 6.3% in April of this year.  She takes Metformin 500 mg twice daily.  She does not monitor blood glucose at home.  Will order updated hemoglobin A1c today.    She has chronic kidney disease.  Last kidney function showed creatinine at 1.07 and GFR at 53.9.  She knows to avoid NSAID's.  She has never seen a Nephrologist.  She does have chronic lower extremity edema, worse on the left.  She does eat sodium-rich foods.  She eats out at restaurants quite a bit.  She does not elevate her legs when sitting.  We will order a referral to Nephrology and order updated labs today.    She reports insomnia.  Symptom onset was one month ago.  She sleeps for about 1-1/2 hours, then wakes up to urinate.  She wakes up almost every hour to urinate through the night.  She takes Melatonin 5 mg with some improvement, and Tylenol PM with some improvement.      Review of Systems   Constitutional:  Negative for chills, fatigue and fever.   HENT:  Negative for congestion and sinus pressure.    Eyes:  Negative for visual disturbance.   Respiratory:  Negative for cough and shortness of breath.    Cardiovascular:  Positive for leg swelling (chronic, no new swelling). Negative for chest pain and palpitations.   Gastrointestinal:  Negative for abdominal pain, constipation, diarrhea, nausea and vomiting.   Genitourinary:  Negative for dysuria, frequency and urgency.   Musculoskeletal:  Negative for back pain.   Skin:  Negative for rash.   Neurological:  Negative for dizziness, syncope and light-headedness.   Psychiatric/Behavioral:  Positive for sleep disturbance. Negative for  "behavioral problems, dysphoric mood, self-injury and suicidal ideas.       Objective   Vital Signs:   /79 (BP Location: Left arm, Patient Position: Sitting, Cuff Size: Adult)   Pulse 82   Temp 98.6 øF (37 øC) (Oral)   Resp 16   Ht 160 cm (62.99\")   Wt 85.7 kg (189 lb)   SpO2 100%   BMI 33.49 kg/mý        Patient's (Body mass index is 33.49 kg/mý.) indicates that they are obese (BMI >30) with health related conditions that include hypertension, diabetes mellitus, dyslipidemias, and osteoarthritis . Weight is newly identified. BMI is is above average; BMI management plan is completed. We discussed low calorie, low carb based diet program, portion control, and increasing exercise.          Physical Exam  Vitals and nursing note reviewed.   Constitutional:       Appearance: She is well-developed. She is not toxic-appearing.   HENT:      Head: Normocephalic.      Right Ear: External ear normal.      Left Ear: External ear normal.   Eyes:      General: No scleral icterus.     Pupils: Pupils are equal, round, and reactive to light.   Neck:      Thyroid: No thyromegaly.   Cardiovascular:      Rate and Rhythm: Normal rate and regular rhythm.      Pulses: Normal pulses.           Dorsalis pedis pulses are 2+ on the right side and 2+ on the left side.        Posterior tibial pulses are 2+ on the right side and 2+ on the left side.      Heart sounds: Normal heart sounds.   Pulmonary:      Effort: Pulmonary effort is normal. No respiratory distress.      Breath sounds: Normal breath sounds. No stridor.   Musculoskeletal:         General: No deformity.      Right lower leg: Edema (trace) present.      Left lower le+ Edema present.   Skin:     General: Skin is warm.      Coloration: Skin is not jaundiced.   Neurological:      General: No focal deficit present.      Mental Status: She is alert and oriented to person, place, and time.      Sensory: Sensation is intact. No sensory deficit.      Motor: Motor function " is intact. No weakness.      Coordination: Coordination is intact.      Gait: Gait is intact. Gait normal.   Psychiatric:         Behavior: Behavior normal.         Thought Content: Thought content normal.         Judgment: Judgment normal.        The following data was reviewed by: ROMAN Mcmahon on 08/14/2023:  Lipid Panel (01/09/2023 10:14)   Hemoglobin A1c (04/18/2023 12:05)                Assessment and Plan      Diagnoses and all orders for this visit:    1. Primary hypertension (Primary)  Comments:  New patient  Well-controlled on current medication  Continue lisinopril-HCTZ  DASH diet, exercise, weight loss  Follow-up in 6 months  Orders:  -     Comprehensive metabolic panel  -     Lipid panel  -     CBC and Differential  -     TSH  -     MicroAlbumin, Urine, Random - Urine, Clean Catch    2. Type 2 diabetes mellitus with stage 3a chronic kidney disease, without long-term current use of insulin  Comments:  New patient  A1c within goal  Low-carb/no sweets diet, exercise, weight loss  A1c today  Follow-up in 6 months  Orders:  -     Comprehensive metabolic panel  -     Lipid panel  -     CBC and Differential  -     TSH  -     Hemoglobin A1c  -     MicroAlbumin, Urine, Random - Urine, Clean Catch  -     Ambulatory Referral to Nephrology    3. Bilateral lower extremity edema  Comments:  Most likely due to CKD  Low-sodium diet, stop eating out, elevate legs  Referral to Nephrology  Follow-up in 6 months  Orders:  -     Ambulatory Referral to Nephrology    4. Pure hyperglyceridemia  Comments:  New patient  Low-cholesterol/low sugar diet, exercise, weight loss  Lipid panel today  Follow-up in 6 months  Orders:  -     Lipid panel    5. Screening for osteoporosis  Comments:  DEXA scan ordered  Low weightbearing exercises  CA+/vitamin D supplement daily  Avoid alcohol  Orders:  -     DEXA Bone Density Axial    6. Encounter for follow-up examination after completed treatment for conditions other than malignant  neoplasm  -     DEXA Bone Density Axial            Follow Up     Return in about 6 months (around 2/14/2024).    Patient was given instructions and counseling regarding her condition or for health maintenance advice. Please see specific information pulled into the AVS if appropriate.     -Referral to Nephrology.  -DEXA scan ordered.  -Follow up in 6 months.

## 2023-08-15 LAB
ALBUMIN SERPL-MCNC: 4.5 G/DL (ref 3.5–5.2)
ALBUMIN/GLOB SERPL: 1.9 G/DL
ALP SERPL-CCNC: 84 U/L (ref 39–117)
ALT SERPL-CCNC: 19 U/L (ref 1–33)
AST SERPL-CCNC: 19 U/L (ref 1–32)
BASOPHILS # BLD AUTO: 0.09 10*3/MM3 (ref 0–0.2)
BASOPHILS NFR BLD AUTO: 1.3 % (ref 0–1.5)
BILIRUB SERPL-MCNC: 0.2 MG/DL (ref 0–1.2)
BUN SERPL-MCNC: 19 MG/DL (ref 8–23)
BUN/CREAT SERPL: 18.4 (ref 7–25)
CALCIUM SERPL-MCNC: 9.8 MG/DL (ref 8.6–10.5)
CHLORIDE SERPL-SCNC: 104 MMOL/L (ref 98–107)
CHOLEST SERPL-MCNC: 116 MG/DL (ref 0–200)
CO2 SERPL-SCNC: 27.1 MMOL/L (ref 22–29)
CREAT SERPL-MCNC: 1.03 MG/DL (ref 0.57–1)
EGFRCR SERPLBLD CKD-EPI 2021: 56.1 ML/MIN/1.73
EOSINOPHIL # BLD AUTO: 0.32 10*3/MM3 (ref 0–0.4)
EOSINOPHIL NFR BLD AUTO: 4.8 % (ref 0.3–6.2)
ERYTHROCYTE [DISTWIDTH] IN BLOOD BY AUTOMATED COUNT: 12.7 % (ref 12.3–15.4)
GLOBULIN SER CALC-MCNC: 2.4 GM/DL
GLUCOSE SERPL-MCNC: 120 MG/DL (ref 65–99)
HBA1C MFR BLD: 6.1 % (ref 4.8–5.6)
HCT VFR BLD AUTO: 40.8 % (ref 34–46.6)
HDLC SERPL-MCNC: 50 MG/DL (ref 40–60)
HGB BLD-MCNC: 13.7 G/DL (ref 12–15.9)
IMM GRANULOCYTES # BLD AUTO: 0.01 10*3/MM3 (ref 0–0.05)
IMM GRANULOCYTES NFR BLD AUTO: 0.1 % (ref 0–0.5)
LDLC SERPL CALC-MCNC: 45 MG/DL (ref 0–100)
LYMPHOCYTES # BLD AUTO: 2.51 10*3/MM3 (ref 0.7–3.1)
LYMPHOCYTES NFR BLD AUTO: 37.4 % (ref 19.6–45.3)
MCH RBC QN AUTO: 29.6 PG (ref 26.6–33)
MCHC RBC AUTO-ENTMCNC: 33.6 G/DL (ref 31.5–35.7)
MCV RBC AUTO: 88.1 FL (ref 79–97)
MICROALBUMIN UR-MCNC: <3 UG/ML
MONOCYTES # BLD AUTO: 0.62 10*3/MM3 (ref 0.1–0.9)
MONOCYTES NFR BLD AUTO: 9.2 % (ref 5–12)
NEUTROPHILS # BLD AUTO: 3.16 10*3/MM3 (ref 1.7–7)
NEUTROPHILS NFR BLD AUTO: 47.2 % (ref 42.7–76)
NRBC BLD AUTO-RTO: 0.1 /100 WBC (ref 0–0.2)
PLATELET # BLD AUTO: 289 10*3/MM3 (ref 140–450)
POTASSIUM SERPL-SCNC: 4.6 MMOL/L (ref 3.5–5.2)
PROT SERPL-MCNC: 6.9 G/DL (ref 6–8.5)
RBC # BLD AUTO: 4.63 10*6/MM3 (ref 3.77–5.28)
SODIUM SERPL-SCNC: 141 MMOL/L (ref 136–145)
TRIGL SERPL-MCNC: 114 MG/DL (ref 0–150)
TSH SERPL DL<=0.005 MIU/L-ACNC: 1.13 UIU/ML (ref 0.27–4.2)
VLDLC SERPL CALC-MCNC: 21 MG/DL (ref 5–40)
WBC # BLD AUTO: 6.71 10*3/MM3 (ref 3.4–10.8)

## 2023-08-16 ENCOUNTER — TELEPHONE (OUTPATIENT)
Dept: FAMILY MEDICINE CLINIC | Facility: CLINIC | Age: 78
End: 2023-08-16
Payer: MEDICARE

## 2023-08-16 ENCOUNTER — TELEPHONE (OUTPATIENT)
Dept: FAMILY MEDICINE CLINIC | Facility: CLINIC | Age: 78
End: 2023-08-16

## 2023-08-16 NOTE — TELEPHONE ENCOUNTER
Caller: Deanne Upton    Relationship: Self    Best call back number: [phone]    What form or medical record are you requesting: LAB RESULTS FROM 8/14/23    Who is requesting this form or medical record from you: PATIENT    How would you like to receive the form or medical records (pick-up, mail, fax):     Timeframe paperwork needed: EARLIEST CONVENIENCE     Additional notes: PATIENT IS REQUESTING IF SHE CAN  THE PAPER COPY OF HER LAB RESULTS THIS WEEK AND REQUESTING A CALLBACK TO KNOW WHEN READY.

## 2023-08-16 NOTE — TELEPHONE ENCOUNTER
----- Message from ROMAN Mcmahon sent at 8/15/2023  8:11 PM EDT -----  Hemoglobin A1c has improved and down to 6.1%.  Great job!  Continue Metformin daily and low-carb/no sweets diet.      Kidney function has improved but still abnormal.  Avoid all NSAID's and stay hydrated.  Follow-up with Nephrology.  I ordered a referral at her appointment.  Liver function is normal.  Cholesterol has improved.  Great job!  Continue cholesterol medication and low-cholesterol diet.    Thyroid and urine protein are normal.  Blood count is normal.

## 2023-08-22 DIAGNOSIS — J01.40 ACUTE NON-RECURRENT PANSINUSITIS: ICD-10-CM

## 2023-08-22 RX ORDER — BENZONATATE 100 MG/1
CAPSULE ORAL
Qty: 30 CAPSULE | Refills: 0 | Status: SHIPPED | OUTPATIENT
Start: 2023-08-22

## 2023-08-22 NOTE — TELEPHONE ENCOUNTER
Rx Refill Note  Requested Prescriptions     Pending Prescriptions Disp Refills    benzonatate (TESSALON) 100 MG capsule [Pharmacy Med Name: Benzonatate Oral Capsule 100 MG] 30 capsule 0     Sig: TAKE 1 CAPSULE BY MOUTH 3 TIMES A DAY AS NEEDED FOR COUGH      Last office visit with prescribing clinician: 8/14/2023   Last telemedicine visit with prescribing clinician: Visit date not found   Next office visit with prescribing clinician: Visit date not found

## 2023-09-19 ENCOUNTER — TELEPHONE (OUTPATIENT)
Dept: FAMILY MEDICINE CLINIC | Facility: CLINIC | Age: 78
End: 2023-09-19

## 2023-09-19 DIAGNOSIS — J45.20 MILD INTERMITTENT ASTHMA WITHOUT COMPLICATION: Primary | ICD-10-CM

## 2023-09-19 RX ORDER — BUDESONIDE AND FORMOTEROL FUMARATE DIHYDRATE 160; 4.5 UG/1; UG/1
2 AEROSOL RESPIRATORY (INHALATION)
Qty: 10.2 G | Refills: 3 | Status: SHIPPED | OUTPATIENT
Start: 2023-09-19

## 2023-09-19 NOTE — TELEPHONE ENCOUNTER
Caller: Deanne Upton    Relationship: Self    Best call back number: [phone]     What was the call regarding: PATIENT WOULD LIKE TO KNOW WHEN SHE IS DUE TO COME IN FOR HER NEXT APPOINTMENT. PLEASE ADVISE.

## 2023-09-19 NOTE — TELEPHONE ENCOUNTER
Caller: Deanne Upton    Relationship: Self    Best call back number: [phone]    What medication are you requesting: GENERIC FOR SYMBICORT  BUDESONIDE    What are your current symptoms: ASTHMA    How long have you been experiencing symptoms:     Have you had these symptoms before:    [x] Yes  [] No    Have you been treated for these symptoms before:   [x] Yes  [] No    If a prescription is needed, what is your preferred pharmacy and phone number:    [facility] - [place], [place] - [address] [facility] [phone] [facility] [phone] FX     Additional notes:

## 2023-09-19 NOTE — TELEPHONE ENCOUNTER
Caller: Deanne Upton    Relationship: Self    Best call back number: [phone]     What was the call regarding: PATIENT WOULD LIKE TO CHECK ON THE STATUS OF THE REFERRAL TO A KIDNEY SPECIALIST. PLEASE ADVISE.

## 2023-09-27 DIAGNOSIS — J01.40 ACUTE NON-RECURRENT PANSINUSITIS: ICD-10-CM

## 2023-09-27 RX ORDER — BENZONATATE 100 MG/1
100 CAPSULE ORAL 3 TIMES DAILY PRN
Qty: 30 CAPSULE | Refills: 1 | Status: SHIPPED | OUTPATIENT
Start: 2023-09-27

## 2023-09-27 NOTE — TELEPHONE ENCOUNTER
PATIENT CALLED BACK TO CHECK ON THE STATUS OF THIS REFILL REQUEST.  SHE SAYS THAT SHE NEEDS TO GET THIS PRESCRIPTION SENT TO HER PHARMACY AS SOON AS POSSIBLE.  PLEASE CONTACT HER BACK WITH UPDATE.    PLEASE ADVISE.

## 2023-09-27 NOTE — TELEPHONE ENCOUNTER
Rx Refill Note  Requested Prescriptions     Pending Prescriptions Disp Refills    benzonatate (TESSALON) 100 MG capsule 30 capsule 0      Last office visit with prescribing clinician: 8/14/2023   Last telemedicine visit with prescribing clinician: Visit date not found   Next office visit with prescribing clinician: Visit date not found

## 2023-09-27 NOTE — TELEPHONE ENCOUNTER
Caller: Deanne Upton    Relationship: Self    Best call back number: [phone]    Requested Prescriptions:   Requested Prescriptions     Pending Prescriptions Disp Refills    benzonatate (TESSALON) 100 MG capsule 30 capsule 0        Pharmacy where request should be sent: [id number] - [place], [place] - [address] - [phone] PH - [phone] FX     Last office visit with prescribing clinician: 8/14/2023   Last telemedicine visit with prescribing clinician: Visit date not found   Next office visit with prescribing clinician: Visit date not found     Additional details provided by patient: PATIENT STATED PHARMACY SENT A REQUEST ON 9/26/23    Does the patient have less than a 3 day supply:  [x] Yes  [] No    Would you like a call back once the refill request has been completed: [] Yes [x] No    If the office needs to give you a call back, can they leave a voicemail: [] Yes [x] No    Lowell Feliciano Rep   09/27/23 08:16 EDT

## 2023-10-03 ENCOUNTER — NURSE TRIAGE (OUTPATIENT)
Dept: CALL CENTER | Facility: HOSPITAL | Age: 78
End: 2023-10-03
Payer: MEDICARE

## 2023-10-03 NOTE — TELEPHONE ENCOUNTER
"Hub- The left leg- on Sunday she awoke could not move it- She has not had anything done, both legs are bothering  her. She states they do feel some better. She does not know what is going on connected with the office back line.   Reason for Disposition   [1] Caller requests to speak ONLY to PCP AND [2] URGENT question    Additional Information   Negative: Lab calling with strep throat test results and triager can call in prescription   Negative: Lab calling with urinalysis test results and triager can call in prescription   Negative: Medication questions   Negative: Medication renewal and refill questions   Negative: Pre-operative or pre-procedural questions   Negative: ED call to PCP (i.e., primary care provider; doctor, NP, or PA)   Negative: Doctor (or NP/PA) call to PCP   Negative: Call about patient who is currently hospitalized   Negative: Lab or radiology calling with CRITICAL test results   Negative: [1] Follow-up call from patient regarding patient's clinical status AND [2] information urgent   Negative: [1] Caller requests to speak to PCP now AND [2] won't tell us reason for call  (Exception: If 10 pm to 6 am, caller must first discuss reason for the call.)   Negative: Notification of hospital admission   Negative: Notification of death   Negative: Caller requesting lab results  (Exception: Routine or non-urgent lab result.)   Negative: Lab or radiology calling with test results   Negative: [1] Follow-up call from patient regarding patient's clinical status AND [2] information NON-URGENT   Negative: [1] Caller requests to speak ONLY to PCP AND [2] NON-URGENT question    Answer Assessment - Initial Assessment Questions  1. REASON FOR CALL or QUESTION: \"What is your reason for calling today?\" or \"How can I best  help you?\" or \"What question do you have that I can help answer?\"     Legs are bothering her.   2. CALLER: Document the source of call. (e.g., laboratory, patient).      Patient.    Protocols used: " PCP Call - No Triage-ADULT-AH

## 2023-10-03 NOTE — TELEPHONE ENCOUNTER
Hub- The left leg- on Sunday she awoke could not move it- She has not had anything done, both legs are bothering  her. She states they do feel some better. She does not know what is going on connected with the office back line.

## 2023-10-06 ENCOUNTER — TELEPHONE (OUTPATIENT)
Dept: CASE MANAGEMENT | Facility: OTHER | Age: 78
End: 2023-10-06
Payer: MEDICARE

## 2023-10-06 NOTE — TELEPHONE ENCOUNTER
Call out to check on patient's leg swelling.  [place], patient does have an appointment 10/11/23 with PCP.

## 2023-10-09 PROBLEM — M50.30 DDD (DEGENERATIVE DISC DISEASE), CERVICAL: Status: ACTIVE | Noted: 2021-08-19

## 2023-10-11 ENCOUNTER — OFFICE VISIT (OUTPATIENT)
Dept: FAMILY MEDICINE CLINIC | Facility: CLINIC | Age: 78
End: 2023-10-11
Payer: MEDICARE

## 2023-10-11 VITALS
TEMPERATURE: 97.7 F | BODY MASS INDEX: 33.49 KG/M2 | OXYGEN SATURATION: 99 % | HEIGHT: 63 IN | DIASTOLIC BLOOD PRESSURE: 80 MMHG | WEIGHT: 189 LBS | HEART RATE: 73 BPM | SYSTOLIC BLOOD PRESSURE: 124 MMHG

## 2023-10-11 DIAGNOSIS — M79.89 SWELLING OF LEFT FOOT: ICD-10-CM

## 2023-10-11 DIAGNOSIS — M79.672 LEFT FOOT PAIN: ICD-10-CM

## 2023-10-11 DIAGNOSIS — R60.0 BILATERAL LOWER EXTREMITY EDEMA: Primary | ICD-10-CM

## 2023-10-11 PROCEDURE — 1159F MED LIST DOCD IN RCRD: CPT

## 2023-10-11 PROCEDURE — 99214 OFFICE O/P EST MOD 30 MIN: CPT

## 2023-10-11 PROCEDURE — 3079F DIAST BP 80-89 MM HG: CPT

## 2023-10-11 PROCEDURE — 3074F SYST BP LT 130 MM HG: CPT

## 2023-10-11 PROCEDURE — 1160F RVW MEDS BY RX/DR IN RCRD: CPT

## 2023-10-11 RX ORDER — MULTIVITAMIN WITH IRON
1 TABLET ORAL DAILY
COMMUNITY

## 2023-10-11 NOTE — PROGRESS NOTES
Chief Complaint  Leg Swelling (Bilateral swelling ) and Pain (Left foot pain about a week ago )    Subjective         Leg Swelling  Pertinent negatives include no abdominal pain, chest pain, chills, congestion, coughing, fatigue, fever, nausea, rash or vomiting.   Pain  Pertinent negatives include no abdominal pain, chest pain, chills, congestion, coughing, fatigue, fever, nausea, rash or vomiting.     Deanne Upton 78 y.o. female presents today reporting chronic bilateral lower extremity edema and left foot pain.  The lower extremity edema has been intermittent for over one year.  The left foot pain started around 10 days ago.      She has chronic kidney disease.  Last kidney function showed slight improvement with creatinine at 1.03 and GFR at 56.1.  She knows to avoid NSAID's.  She does have chronic lower extremity edema, worse on the left.  She does eat sodium-rich foods.  She eats out at restaurants quite a bit.  She does not elevate her legs when sitting.  She was referred to Nephrology at her last appointment.  She has not been contacted by Nephrology to make the appointment yet.    Left venous duplex was negative for DVT on 5/5/2023.    She is still eating out at restaurants 2-3 days per week.  She does still eat sodium-rich foods.  She tries to keep her legs elevated.  She is walking more now, up to 4,000 steps.    She reports left foot pain.  Symptom onset was about 10 days ago.  She dropped a paper shredder on the top of the left foot.  She reports associated swelling.  She is established with Dr. Moore, Podiatry.      Review of Systems   Constitutional:  Negative for chills, fatigue and fever.   HENT:  Negative for congestion and sinus pressure.    Eyes:  Negative for visual disturbance.   Respiratory:  Negative for cough and shortness of breath.    Cardiovascular:  Positive for leg swelling (chronic, no new swelling). Negative for chest pain and palpitations.   Gastrointestinal:  Negative for  "abdominal pain, constipation, diarrhea, nausea and vomiting.   Genitourinary:  Negative for dysuria, frequency and urgency.   Musculoskeletal:  Negative for back pain.   Skin:  Negative for rash.   Neurological:  Negative for dizziness, syncope and light-headedness.   Psychiatric/Behavioral:  Negative for behavioral problems, dysphoric mood, self-injury, sleep disturbance and suicidal ideas.         Objective   Vital Signs:   /80   Pulse 73   Temp 97.7 øF (36.5 øC)   Ht 160 cm (62.99\")   Wt 85.7 kg (189 lb)   SpO2 99%   BMI 33.49 kg/mý               Physical Exam  Vitals and nursing note reviewed.   Constitutional:       Appearance: She is well-developed. She is not toxic-appearing.   HENT:      Head: Normocephalic.   Eyes:      General: No scleral icterus.     Pupils: Pupils are equal, round, and reactive to light.   Neck:      Thyroid: No thyromegaly.   Cardiovascular:      Rate and Rhythm: Normal rate and regular rhythm.      Pulses:           Dorsalis pedis pulses are 1+ on the right side and 1+ on the left side.        Posterior tibial pulses are 1+ on the right side and 1+ on the left side.      Heart sounds: Normal heart sounds.   Pulmonary:      Effort: Pulmonary effort is normal. No respiratory distress.      Breath sounds: Normal breath sounds. No stridor.   Musculoskeletal:         General: No deformity.      Right lower le+ Edema present.      Left lower le+ Pitting Edema present.      Left foot: Decreased range of motion. Normal capillary refill. Swelling present. No foot drop, tenderness or bony tenderness. Abnormal pulse (diminished).   Skin:     General: Skin is warm.      Coloration: Skin is not jaundiced.   Neurological:      General: No focal deficit present.      Mental Status: She is alert and oriented to person, place, and time.      Sensory: Sensation is intact. No sensory deficit.      Motor: Motor function is intact. No weakness.      Coordination: Coordination is intact. "      Gait: Gait is intact. Gait normal.   Psychiatric:         Behavior: Behavior normal.         Thought Content: Thought content normal.         Judgment: Judgment normal.          The following data was reviewed by: ROMAN Mcmahon on 10/11/2023:  Comprehensive metabolic panel (08/14/2023 11:38)   Duplex Venous Lower Extremity - Left CAR (05/05/2023 12:01)                Assessment and Plan      Diagnoses and all orders for this visit:    1. Bilateral lower extremity edema (Primary)  Comments:  Most likely due to CKD  Low-sodium diet, stop eating out, elevate legs  Referred to Nephrology at last appt.  Compression socks-DME   Return if symptoms worsen  Orders:  -     Compression Stockings    2. Left foot pain  Comments:  X-ray today-pending Radiology review  RICE therapy  Follow up with established Podiatrist  Orders:  -     XR Foot 3+ View Left (In Office)  -     Compression Stockings    3. Swelling of left foot  Comments:  X-ray today-pending Radiology review  RICE therapy  Follow up with established Podiatrist  Orders:  -     XR Foot 3+ View Left (In Office)  -     Compression Stockings            Follow Up     Return if symptoms worsen or fail to improve.    Patient was given instructions and counseling regarding her condition or for health maintenance advice. Please see specific information pulled into the AVS if appropriate.     -Follow up with Nephrology and Podiatry.  -DME order for compression socks.  -Return if symptoms worsen or do not improve.

## 2023-10-12 ENCOUNTER — PATIENT OUTREACH (OUTPATIENT)
Dept: CASE MANAGEMENT | Facility: OTHER | Age: 78
End: 2023-10-12
Payer: MEDICARE

## 2023-10-12 DIAGNOSIS — R60.0 BILATERAL EDEMA OF LOWER EXTREMITY: ICD-10-CM

## 2023-10-12 DIAGNOSIS — N18.30 STAGE 3 CHRONIC KIDNEY DISEASE, UNSPECIFIED WHETHER STAGE 3A OR 3B CKD: Primary | ICD-10-CM

## 2023-10-12 DIAGNOSIS — E11.69 TYPE 2 DIABETES MELLITUS WITH OTHER SPECIFIED COMPLICATION, WITHOUT LONG-TERM CURRENT USE OF INSULIN: ICD-10-CM

## 2023-10-12 NOTE — OUTREACH NOTE
AMBULATORY CASE MANAGEMENT NOTE    Name and Relationship of Patient/Support Person: Deanne Upton - Self    Adult Patient Profile  Questions/Answers      Flowsheet Row Most Recent Value   Symptoms/Conditions Managed at Home genitourinary   Diabetes Management Strategies routine screenings, weight management   Usual Blood Glucose Doesn't routinley monitor   Last A1C Result 6.10   Diabetes Self-Management Outcome 4 (good)   Diabetes Comment no concerns verbalized, sent dietary conciderations, as patient does eat out often   Genitourinary Symptoms/Conditions chronic kidney disease   Genitourinary Self-Management Outcome 3 (uncertain)   Genitourinary Comment eats out often, does not practice a low sodium diet.  Sent patient dietary information to assist with managing diet when eating out   Peripheral Neurovascular Symptoms/Conditions peripheral artery disease   Peripheral Neurovascular Management Strategies routine screening, diet modification   Peripheral Neurovascular Self-Management Outcome 3 (uncertain)   Peripheral Neurovascular Comment bilateral lower extremity edema, left is worse.        [facility] Interim Update  Call to patient, verified by name and date of birth.    Patient stating that x-ray of Left foot was completed on 10/11/23, awaiting results.    Stating that she doesn't have a large refrigerator and tends to eat out about 2-3 times a week.  States that she does try not to eat a lot of salty foods, and was not aware that it was a possible cause of her edema.    Verbalizing that she will be going out to get a pair of compression socks today.  States that she doesn't sit much and really doesn't have time to rest or sit unless she's in bed.    Care Coordination    ACM, reviewed office visit with pcp.  Discussed foot pain and encouraged RICE, discussed patient's CKD and edema, providing low sodium alternatives when eating out, sending to patient's home address as requested.  Further discussion r/t follow up on  her referral to Nephrology, provided contact information.         Plan: follow up   - Nephrology referral/ appointment?  - podiatry appointment?  - xray results?  - low sodium diet?  - compression socks/ edema?          Education Documentation  No documentation found.        Radha LOUISE  Ambulatory Case Management    10/12/2023, 12:31 EDT

## 2023-10-25 ENCOUNTER — TELEPHONE (OUTPATIENT)
Dept: FAMILY MEDICINE CLINIC | Facility: CLINIC | Age: 78
End: 2023-10-25

## 2023-10-25 ENCOUNTER — PATIENT OUTREACH (OUTPATIENT)
Dept: CASE MANAGEMENT | Facility: OTHER | Age: 78
End: 2023-10-25
Payer: MEDICARE

## 2023-10-25 DIAGNOSIS — R60.0 BILATERAL EDEMA OF LOWER EXTREMITY: Primary | ICD-10-CM

## 2023-10-25 DIAGNOSIS — N18.30 STAGE 3 CHRONIC KIDNEY DISEASE, UNSPECIFIED WHETHER STAGE 3A OR 3B CKD: ICD-10-CM

## 2023-10-25 NOTE — OUTREACH NOTE
AMBULATORY CASE MANAGEMENT NOTE    Name and Relationship of Patient/Support Person: Deanne Upton - Self    CCM Interim Update    RN-ACM outreach follow up call with patient. Patient has not yet heard back from podiatry about xray results. Patient plans to call PCP office to ask about xray results this afternoon. Patient still complains of foot pain. Patient has not yet gotten compression socks. Patient states she is unsure of where to get measurements for those. RN-ACM suggested patient ask PCP office to measure for compression socks or a local DME company. RN-ACM also advised patient how to measure her legs herself for compression socks. Patient verbalized understanding. Patient states appreciation for CCM calls and diabetic diet information that was sent to her in the mail. Patient states she tries to make healthy food choices. RN-ACM educated patient on portion sizes. RN-ACM advised patient to call RN-ACM or [facility] Nurse Line with any needs.       Follow up outreach as scheduled.     Education Documentation  fall prevention, taught by Sushila Stephens RN at 10/25/2023  1:28 PM.  Learner: Patient  Readiness: Acceptance  Method: Explanation  Response: Verbalizes Understanding          Sushila CESAR  Ambulatory Case Management    10/25/2023, 13:30 EDT

## 2023-10-25 NOTE — TELEPHONE ENCOUNTER
Caller: Deanne Upton    Relationship: Self    Best call back number: [phone]*    What is the medical concern/diagnosis: LEFT FOOT PAIN AND SWELLING    What specialty or service is being requested: ORTHOPEDIC    What is the provider, practice or medical service name: [facility]    What is the office location: CLOSE TO [facility], [facility], OR [place]. PATIENT STATES SHE DOES NOT WANT TO BE REFERRED TO ANYONE IN THE [facility] AREA.    Any additional details: PATIENT STATES THAT SHE IS REQUESTING A REFERRAL TO SOMEONE THAT CAN FIND OUT WHAT IS WRONG WITH HER LEFT FOOT.

## 2023-10-30 ENCOUNTER — PATIENT OUTREACH (OUTPATIENT)
Dept: CASE MANAGEMENT | Facility: OTHER | Age: 78
End: 2023-10-30
Payer: MEDICARE

## 2023-10-30 DIAGNOSIS — E11.69 TYPE 2 DIABETES MELLITUS WITH OTHER SPECIFIED COMPLICATION, WITHOUT LONG-TERM CURRENT USE OF INSULIN: ICD-10-CM

## 2023-10-30 DIAGNOSIS — R60.0 BILATERAL EDEMA OF LOWER EXTREMITY: Primary | ICD-10-CM

## 2023-10-30 DIAGNOSIS — N18.30 STAGE 3 CHRONIC KIDNEY DISEASE, UNSPECIFIED WHETHER STAGE 3A OR 3B CKD: ICD-10-CM

## 2023-10-30 NOTE — OUTREACH NOTE
[facility] End of Month Documentation    This Chronic Medical Management Care Plan for Deanne Upton, 78 y.o. female, has been monitored and managed; reviewed and a new plan of care implemented for the month of October.  A cumulative time of 54  minutes was spent on this patient record this month, including phone call with patient; chart review; electronic communication with other providers; phone call with other providers, low sodium ideas when eating out, elevation and use of compression socks.  Encouraging ice for injured foot, along with rest and elevation.  Awaiting results of xray, Nephrology referral to Dr. Tori Sherman.    Regarding the patient's problems: has Atopic rhinitis; Anxiety; Asthma; Bunion; Depression; Functional murmur; Bilateral hearing loss; Hypertension; Osteoarthritis of knee; Pure hyperglyceridemia; Skin neoplasm; Type 2 diabetes mellitus; Vitamin D deficiency; Vaginal prolapse; Morbid obesity; Degenerative disc disease, cervical; Limited joint range of motion; Chronic neck pain; Cervical spondylosis without myelopathy; Occipital neuralgia of left side; Open angle with borderline findings, low risk, bilateral; Presbyopia; Primary open angle glaucoma; CKD (chronic kidney disease) stage 3, GFR 30-59 ml/min; Cervical radiculopathy; Other dysphagia; Other insomnia; Routine health maintenance; and DDD (degenerative disc disease), cervical on their problem list., the following items were addressed: medical records; medications, patient safety and wellness/ continuity of care/ access to resources and any changes can be found within the plan section of the note.  A detailed listing of time spent for chronic care management is tracked within each outreach encounter.  Current medications include:  has a current medication list which includes the following prescription(s): acetaminophen, albuterol sulfate hfa, b complex + c tr, benzonatate, budesonide-formoterol, cetirizine hcl, vitamin d,  diphenhydramine-acetaminophen, krill oil, lisinopril-hydrochlorothiazide, lumigan, magnesium, melatonin, metformin, mometasone, montelukast, multiple vitamin, omeprazole, peak flow meter, simvastatin, and spacer/aero chamber mouthpiece. and the patient is reported to be patient is compliant with medication protocol,  Medications are reported to be effective.  Regarding these diagnoses, referrals were made to the following provider(s):  none.  All notes on chart for PCP to review.    The patient was monitored remotely for activity level; pain, elevation and compression, low sodium meal ideas when eating out, monitor edema.    The patient's physical needs include:  physical healthcare.     The patient's mental support needs include:  not applicable    The patient's cognitive support needs include:  not applicable    The patient's psychosocial support needs include:  not applicable    The patient's functional needs include: physical healthcare, access to resources/ financial stresses, low income housing    The patient's environmental needs include:  not applicable    Care Plan overall comments:  No data recorded    Refer to previous outreach notes for more information on the areas listed above.    Monthly Billing Diagnoses  (R60.0) Bilateral edema of lower extremity    (N18.30) Stage 3 chronic kidney disease, unspecified whether stage 3a or 3b CKD    (E11.69) Type 2 diabetes mellitus with other specified complication, without long-term current use of insulin    Medications   Medications have been reconciled    Care Plan progress this month:      Recently Modified Care Plans Updates made since 9/29/2023 12:00 AM       Osteoarthritis (Adult)           Problem Priority Last Modified     Mobility and Function (Osteoarthritis) --  4/19/2023  2:29 PM by Radha Mcfarland RN              Goal Recent Progress Last Modified     Maintain Mobility and Function --  4/19/2023  2:29 PM by Radha Mcfarland RN     Evidence-based guidance:  "  Acknowledge and validate impact of pain, loss of strength and potential disfigurement (hand osteoarthritis) on mental health and daily life, such as social isolation, anxiety, depression, impaired sexual relationship and    injury from falls.   Anticipate referral to physical or occupational therapy for assessment, therapeutic exercise and recommendation for adaptive equipment or assistive devices; encourage participation.   Assess impact on ability to perform activities of daily living, as well as engage in sports and leisure events or requirements of work or school.   Provide anticipatory guidance and reassurance about the benefit of exercise to maintain function; acknowledge and normalize fear that exercise may worsen symptoms.   Encourage regular exercise, at least 10 minutes at a time for 45 minutes per week; consider yoga, water exercise and proprioceptive exercises; encourage use of wearable activity tracker to increase motivation and adherence.   Encourage maintenance or resumption of daily activities, including employment, as pain allows and with minimal exposure to trauma.   Assist patient to advocate for adaptations to the work environment.   Consider level of pain and function, gender, age, lifestyle, patient preference, quality of life, readiness and  €œcapacity to benefit\" when recommending patients for orthopaedic surgery consultation.   Explore strategies, such as changes to medication regimen or activity that enables patient to anticipate and manage flare-ups that increase deconditioning and disability.   Explore patient preferences; encourage exposure to a broader range of activities that have been avoided for fear of experiencing pain.   Identify barriers to participation in therapy or exercise, such as pain with activity, anticipated or imagined pain.   Monitor postoperative joint replacement or any preexisting joint replacement for ongoing pain and loss of function; provide social support and " encouragement throughout recovery.    Notes:            Task Due Date Last Modified     Maintain or Improve Strength and Functional Ability --  10/25/2023  1:24 PM by Sushila Stephens RN     Care Management Activities:      - not discussed during this outreach      Notes:                Problem Priority Last Modified     Harm or Injury (Osteoarthritis) --  4/19/2023  2:29 PM by Radha Mcfarland RN              Goal Recent Progress Last Modified     Harm or Injury Prevented --  4/19/2023  2:29 PM by Radha Mcfarland RN     Evidence-based guidance:   Assess fall risk related to postural control, balance and gait impairment, muscle weakness, diminished vision and hearing, presence of incontinence, environmental hazards and effects of medication.   Address potential barriers to activity or exercise, such as low self-efficacy and fear of falling.   Address fall risk by considering single vison versus multifocal lenses (glasses) during activity, environmental modification and antislip, flat-heeled shoes, use of orthoses and assistive devices.   Promote regular exercise focused on improving strength, based on ability and tolerance; consider activities, such as yoga or christy chi, that promote balance.   Review current medication; consider vitamin D supplementation and de-prescription of psychotropic medication.   Monitor and address analgesic use, such as acetaminophen, nonsteroidal anti-inflammatory agent or opioid, for complications that may include hepatotoxicity, dependence, constipation, dyspepsia, gastrointestinal bleeding, heart or    renal disease.   Monitor depression, anxiety and suicide risk.   Collaborate with patient and parent/caregiver to develop a safety plan or coping action plan to reduce suicide risk.   Include recognition of warning signs of an impending suicidal crisis, restricting access to lethal means, use of internal coping strategies and contact methods for mental health professional or agency.    Notes:             Task Due Date Last Modified     Identify and Reduce Risk to Safety --  10/25/2023  1:25 PM by Sushila Stephens RN     Care Management Activities:      - not discussed during this outreach      Notes:                          Current Specialty Plan of Care Status signed by provider    Instructions   Patient was provided an electronic copy of care plan  CCM services were explained and offered and patient has accepted these services.  Patient has given their written consent to receive CCM services and understands that this includes the authorization of electronic communication of medical information with the other treating providers.  Patient understands that they may stop CCM services at any time and these changes will be effective at the end of the calendar month and will effectively revocate the agreement of CCM services.  Patient understands that only one practitioner can furnish and be paid for CCM services during one calendar month.  Patient also understands that there may be co-payment or deductible fees in association with CCM services.  Patient will continue with at least monthly follow-up calls with the Ambulatory .    Rylie BOLES  Ambulatory Case Management    10/30/2023, 08:56 EDT

## 2023-11-06 ENCOUNTER — TELEPHONE (OUTPATIENT)
Dept: FAMILY MEDICINE CLINIC | Facility: CLINIC | Age: 78
End: 2023-11-06

## 2023-11-06 NOTE — TELEPHONE ENCOUNTER
Caller: Deanne Upton    Relationship: Self    Best call back number: [phone]     What form or medical record are you requesting: XRAY OF FOOT    Who is requesting this form or medical record from you: DR. AMANDA GOMEZ    How would you like to receive the form or medical records (pick-up, mail, fax): FAX  If fax, what is the fax number: [phone]    Timeframe paperwork needed: ASAP, APPOINTMENT IS NEXT WEEK    Additional notes: NONE.

## 2023-11-07 ENCOUNTER — TELEPHONE (OUTPATIENT)
Dept: CASE MANAGEMENT | Facility: OTHER | Age: 78
End: 2023-11-07
Payer: MEDICARE

## 2023-11-07 ENCOUNTER — PATIENT OUTREACH (OUTPATIENT)
Dept: CASE MANAGEMENT | Facility: OTHER | Age: 78
End: 2023-11-07
Payer: MEDICARE

## 2023-11-07 DIAGNOSIS — E11.69 TYPE 2 DIABETES MELLITUS WITH OTHER SPECIFIED COMPLICATION, WITHOUT LONG-TERM CURRENT USE OF INSULIN: ICD-10-CM

## 2023-11-07 DIAGNOSIS — R60.0 BILATERAL EDEMA OF LOWER EXTREMITY: Primary | ICD-10-CM

## 2023-11-07 DIAGNOSIS — N18.30 STAGE 3 CHRONIC KIDNEY DISEASE, UNSPECIFIED WHETHER STAGE 3A OR 3B CKD: ICD-10-CM

## 2023-11-07 NOTE — OUTREACH NOTE
AMBULATORY CASE MANAGEMENT NOTE    Name and Relationship of Patient/Support Person: Deanne Upton K - Self    CCM Interim Update  Call received by patient today, verified by name and date of birth.    States that her foot xray was completed on 10/11/23.  No results released.  Patient to see Viviana Moore on 11/14/23, would like results faxed to office prior to appointment.    Verbalizing financial concerns but continues to insist that she does not desire assistance from social work.    Offered assistance with transportation d/t car concerns, refused.        Care Coordination    ACM messaging PCP requesting copy of foot xray be faxed to Viviana Canela office for Tuesday 11/14 appointment.  Xray completed on 10/11/23, could not fine release of results.      Plan: to follow up prior to podiatry appointment  - with fax to Viviana Moore office.    Follow up in a week or two   - chart review  - follow up needs/ foot pain  - possible financial/ transportation needs/ SW assistance (monitor if continues to refuse)          Education Documentation  No documentation found.        Radha LOUISE  Ambulatory Case Management    11/7/2023, 13:15 EST

## 2023-11-14 ENCOUNTER — TELEPHONE (OUTPATIENT)
Dept: CASE MANAGEMENT | Facility: OTHER | Age: 78
End: 2023-11-14
Payer: MEDICARE

## 2023-11-21 ENCOUNTER — PATIENT OUTREACH (OUTPATIENT)
Dept: CASE MANAGEMENT | Facility: OTHER | Age: 78
End: 2023-11-21
Payer: MEDICARE

## 2023-11-21 DIAGNOSIS — E11.69 TYPE 2 DIABETES MELLITUS WITH OTHER SPECIFIED COMPLICATION, WITHOUT LONG-TERM CURRENT USE OF INSULIN: ICD-10-CM

## 2023-11-21 DIAGNOSIS — N18.30 STAGE 3 CHRONIC KIDNEY DISEASE, UNSPECIFIED WHETHER STAGE 3A OR 3B CKD: ICD-10-CM

## 2023-11-21 DIAGNOSIS — R60.0 BILATERAL EDEMA OF LOWER EXTREMITY: Primary | ICD-10-CM

## 2023-11-21 NOTE — OUTREACH NOTE
AMBULATORY CASE MANAGEMENT NOTE    Name and Relationship of Patient/Support Person: Deanne Upton - Self    CCM Interim Update    Call from patient, verified by name and date of birth.      Patient states that xray was received by podiatry and there were no new breaks.    Patient states that she was started on an anti-inflammatory to decrease the swelling.      No further nursing needs at this time.    Care Coordination    ACM encouraged rest, elevation and compression.  ACM spoke with Viviana at Dr. Moore office to request office notes to maintain continuity of care.    Plan: is to follow up in two to three weeks   - monitor swelling/ pain  - ACP?  - flu vaccination?  - any further SW/ nursing needs?             Education Documentation  No documentation found.        Radha LOUISE  Ambulatory Case Management    11/21/2023, 12:55 EST

## 2023-11-27 ENCOUNTER — TELEPHONE (OUTPATIENT)
Dept: CASE MANAGEMENT | Facility: OTHER | Age: 78
End: 2023-11-27
Payer: MEDICARE

## 2023-11-27 ENCOUNTER — PATIENT OUTREACH (OUTPATIENT)
Dept: CASE MANAGEMENT | Facility: OTHER | Age: 78
End: 2023-11-27
Payer: MEDICARE

## 2023-11-27 DIAGNOSIS — E11.69 TYPE 2 DIABETES MELLITUS WITH OTHER SPECIFIED COMPLICATION, WITHOUT LONG-TERM CURRENT USE OF INSULIN: ICD-10-CM

## 2023-11-27 DIAGNOSIS — N18.30 STAGE 3 CHRONIC KIDNEY DISEASE, UNSPECIFIED WHETHER STAGE 3A OR 3B CKD: ICD-10-CM

## 2023-11-27 DIAGNOSIS — R60.0 BILATERAL EDEMA OF LOWER EXTREMITY: Primary | ICD-10-CM

## 2023-11-27 PROCEDURE — 99490 CHRNC CARE MGMT STAFF 1ST 20: CPT | Performed by: INTERNAL MEDICINE

## 2023-11-27 NOTE — OUTREACH NOTE
AMBULATORY CASE MANAGEMENT NOTE    Name and Relationship of Patient/Support Person: Deanne Upton K - Self    CCM Interim Update    Patient returned call for CCM update. F/U with L foot edema, she states it's still swelling. She did notice the edema was more over the weekend of Thanksgiving Holiday since she was more on her feet. She continues to be able to put weight and ambulate on her foot. No other worsening symptoms reported such as SOB. She mentioned she continues to have arthritis pain.    Patient states she will plan to get Flu Vaccine and then plan on getting COVID vaccines after. She would prefer to get vaccines one at a time and not all together.     Patient requests for F/U appointment set up after 6 months. Appointment made and patient agreeable for 6/4/2024 at 10:30pm      Education Documentation  No documentation found.        Zenia CESAR  Ambulatory Case Management    11/27/2023, 15:35 EST

## 2023-11-29 ENCOUNTER — PATIENT OUTREACH (OUTPATIENT)
Dept: CASE MANAGEMENT | Facility: OTHER | Age: 78
End: 2023-11-29
Payer: MEDICARE

## 2023-11-29 DIAGNOSIS — E11.69 TYPE 2 DIABETES MELLITUS WITH OTHER SPECIFIED COMPLICATION, WITHOUT LONG-TERM CURRENT USE OF INSULIN: ICD-10-CM

## 2023-11-29 DIAGNOSIS — R60.0 BILATERAL EDEMA OF LOWER EXTREMITY: Primary | ICD-10-CM

## 2023-11-29 DIAGNOSIS — N18.30 STAGE 3 CHRONIC KIDNEY DISEASE, UNSPECIFIED WHETHER STAGE 3A OR 3B CKD: ICD-10-CM

## 2023-11-29 NOTE — OUTREACH NOTE
[facility] End of Month Documentation    This Chronic Medical Management Care Plan for Deanne Upton, 78 y.o. female, has been monitored and managed; reviewed and a new plan of care implemented for the month of November.  A cumulative time of 30  minutes was spent on this patient record this month, including phone call with patient; chart review; electronic communication with other providers; phone call with other providers, Encouraging rest, ice and elevation for foot.  Social needs, requested office notes from podiatry.    Regarding the patient's problems: has Atopic rhinitis; Anxiety; Asthma; Bunion; Depression; Functional murmur; Bilateral hearing loss; Hypertension; Osteoarthritis of knee; Pure hyperglyceridemia; Skin neoplasm; Type 2 diabetes mellitus; Vitamin D deficiency; Vaginal prolapse; Morbid obesity; Degenerative disc disease, cervical; Limited joint range of motion; Chronic neck pain; Cervical spondylosis without myelopathy; Occipital neuralgia of left side; Open angle with borderline findings, low risk, bilateral; Presbyopia; Primary open angle glaucoma; CKD (chronic kidney disease) stage 3, GFR 30-59 ml/min; Cervical radiculopathy; Other dysphagia; Other insomnia; Routine health maintenance; and DDD (degenerative disc disease), cervical on their problem list., the following items were addressed: medical records; medications, patient safety and wellness/ continuity of care/ access to resources and any changes can be found within the plan section of the note.  A detailed listing of time spent for chronic care management is tracked within each outreach encounter.  Current medications include:  has a current medication list which includes the following prescription(s): acetaminophen, albuterol sulfate hfa, b complex + c tr, benzonatate, budesonide-formoterol, cetirizine hcl, vitamin d, diphenhydramine-acetaminophen, krill oil, lisinopril-hydrochlorothiazide, lumigan, magnesium, melatonin, metformin,  mometasone, montelukast, multiple vitamin, omeprazole, peak flow meter, simvastatin, and spacer/aero chamber mouthpiece. and the patient is reported to be patient is compliant with medication protocol,  Medications are reported to be effective.  Regarding these diagnoses, referrals were made to the following provider(s):  none.  All notes on chart for PCP to review.    The patient was monitored remotely for activity level; pain, elevation and compression, low sodium meal ideas when eating out, monitor edema/ compliance of care and nutritional needs.    The patient's physical needs include:  physical healthcare.     The patient's mental support needs include:  not applicable    The patient's cognitive support needs include:  not applicable    The patient's psychosocial support needs include:  not applicable    The patient's functional needs include: physical healthcare, access to resources/ financial stresses, low income housing    The patient's environmental needs include:  not applicable, na    Care Plan overall comments:  non-compliance of treatments, nutrtional needs and disease control.  Refusing assistance with transportation or housing.    Refer to previous outreach notes for more information on the areas listed above.    Monthly Billing Diagnoses  (R60.0) Bilateral edema of lower extremity    (N18.30) Stage 3 chronic kidney disease, unspecified whether stage 3a or 3b CKD    (E11.69) Type 2 diabetes mellitus with other specified complication, without long-term current use of insulin    Medications   Medications have been reconciled    Care Plan progress this month:      Recently Modified Care Plans Updates made since 10/29/2023 12:00 AM      No recently modified care plans.          Instructions   Patient was provided an electronic copy of care plan  CCM services were explained and offered and patient has accepted these services.  Patient has given their written consent to receive CCM services and understands  that this includes the authorization of electronic communication of medical information with the other treating providers.  Patient understands that they may stop CCM services at any time and these changes will be effective at the end of the calendar month and will effectively revocate the agreement of CCM services.  Patient understands that only one practitioner can furnish and be paid for CCM services during one calendar month.  Patient also understands that there may be co-payment or deductible fees in association with CCM services.  Patient will continue with at least monthly follow-up calls with the Ambulatory .    Radha LOUISE  Ambulatory Case Management    11/29/2023, 13:15 EST

## 2023-12-14 PROBLEM — Z12.11 ENCOUNTER FOR SCREENING FOR MALIGNANT NEOPLASM OF COLON: Status: ACTIVE | Noted: 2023-04-27

## 2023-12-21 ENCOUNTER — PATIENT OUTREACH (OUTPATIENT)
Dept: CASE MANAGEMENT | Facility: OTHER | Age: 78
End: 2023-12-21
Payer: MEDICARE

## 2023-12-21 ENCOUNTER — TELEPHONE (OUTPATIENT)
Dept: CASE MANAGEMENT | Facility: OTHER | Age: 78
End: 2023-12-21
Payer: MEDICARE

## 2023-12-21 DIAGNOSIS — N18.30 STAGE 3 CHRONIC KIDNEY DISEASE, UNSPECIFIED WHETHER STAGE 3A OR 3B CKD: ICD-10-CM

## 2023-12-21 DIAGNOSIS — R60.0 BILATERAL EDEMA OF LOWER EXTREMITY: Primary | ICD-10-CM

## 2023-12-21 DIAGNOSIS — J01.40 ACUTE NON-RECURRENT PANSINUSITIS: ICD-10-CM

## 2023-12-21 RX ORDER — BENZONATATE 100 MG/1
100 CAPSULE ORAL 3 TIMES DAILY PRN
Qty: 30 CAPSULE | Refills: 1 | OUTPATIENT
Start: 2023-12-21

## 2023-12-21 NOTE — OUTREACH NOTE
AMBULATORY CASE MANAGEMENT NOTE    Name and Relationship of Patient/Support Person: Deanne Upton - Self    CCM Interim Update    Call to check on patient foot swelling, verified by name and date of birth.    States that foot is still swollen but primarily from arthritis.  Denies SOB, or weight gain.  Patient declines assistance with healthier food choices, or activity.      States a need for medication refill at this time.    No further nursing or social work needs at this time.        Education Documentation  No documentation found.        Radha LOUISE  Ambulatory Case Management    12/21/2023, 10:37 EST

## 2023-12-21 NOTE — TELEPHONE ENCOUNTER
Chart review from last outreach.  No new office visits or nursing needs at this time.    Patient declining Social Work assistance for now and will graduate

## 2023-12-27 ENCOUNTER — TELEPHONE (OUTPATIENT)
Dept: FAMILY MEDICINE CLINIC | Facility: CLINIC | Age: 78
End: 2023-12-27
Payer: MEDICARE

## 2023-12-27 NOTE — TELEPHONE ENCOUNTER
Caller: Deanne Upton     Relationship: SELF    Best call back number: [phone]     What is your medical concern? EXPOSED TO RSV     How long has this issue been going on? MILO DAY     Is your provider already aware of this issue? NO     Have you been treated for this issue? N/A       [id number] - [place], [place] - [address] - [phone]  - [phone]  [phone]

## 2023-12-28 NOTE — TELEPHONE ENCOUNTER
LDTVM questions r/t s/s of RSV. Pt instructed if she has s/s she will need eval. Pt instructed to return call to make appt.

## 2023-12-28 NOTE — TELEPHONE ENCOUNTER
Pt returned call. Pt denied s/s of RSV, covid or flu.  Pt will monitor at home at this time. Advised to wash hands, stay hydrated, obtain pulse ox to have on hand, call if s/s to arise. Pt verbalized understanding

## 2023-12-29 DIAGNOSIS — J01.40 ACUTE NON-RECURRENT PANSINUSITIS: ICD-10-CM

## 2024-01-02 RX ORDER — BENZONATATE 100 MG/1
CAPSULE ORAL
Qty: 30 CAPSULE | Refills: 0 | Status: SHIPPED | OUTPATIENT
Start: 2024-01-02

## 2024-01-19 ENCOUNTER — TELEPHONE (OUTPATIENT)
Dept: CASE MANAGEMENT | Facility: OTHER | Age: 79
End: 2024-01-19
Payer: MEDICARE

## 2024-01-19 NOTE — TELEPHONE ENCOUNTER
Thorough chart review completed.  No new orders, or further nursing or social needs at this time.  Will move to close at this time.

## 2024-03-08 ENCOUNTER — TELEPHONE (OUTPATIENT)
Dept: FAMILY MEDICINE CLINIC | Facility: CLINIC | Age: 79
End: 2024-03-08
Payer: MEDICARE

## 2024-03-08 NOTE — TELEPHONE ENCOUNTER
PATIENT IS CALLING STATING THE OFFICE HAS CALLED HER ABOUT HER INSURANCE.    SHE STATES SHE HAD ALREADY CANCELLED HER APPT WITH [facility] AND HAS CHANGED HER INSURANCE FROM [facility], BUT IS NOW GOING TO SEE A NEW PCP WITH [facility].

## 2025-01-08 NOTE — TELEPHONE ENCOUNTER
Quality 226: Preventive Care And Screening: Tobacco Use: Screening And Cessation Intervention: Patient screened for tobacco use and is an ex/non-smoker [facility]       Ok for hub to read       Hemoglobin A1c has improved and down to 6.1%.  Great job!  Continue Metformin daily and low-carb/no sweets diet.       Kidney function has improved but still abnormal.  Avoid all NSAID's and stay hydrated.  Follow-up with Nephrology.  I ordered a referral at her appointment.  Liver function is normal.  Cholesterol has improved.  Great job!  Continue cholesterol medication and low-cholesterol diet.     Thyroid and urine protein are normal.  Blood count is normal.   Detail Level: Detailed

## (undated) DEVICE — NDL SPINE 22G 31/2IN BLK

## (undated) DEVICE — EPIDURAL TRAY: Brand: MEDLINE INDUSTRIES, INC.

## (undated) DEVICE — NDL EPID TUOHY W/WINGS 20G 3.5IN

## (undated) DEVICE — GLV SURG TRIUMPH PF LTX 7.5 STRL

## (undated) DEVICE — Device: Brand: PORTEX